# Patient Record
Sex: MALE | Race: WHITE | NOT HISPANIC OR LATINO | Employment: FULL TIME | URBAN - NONMETROPOLITAN AREA
[De-identification: names, ages, dates, MRNs, and addresses within clinical notes are randomized per-mention and may not be internally consistent; named-entity substitution may affect disease eponyms.]

---

## 2018-01-24 ENCOUNTER — OFFICE VISIT (OUTPATIENT)
Dept: INTERNAL MEDICINE | Facility: CLINIC | Age: 53
End: 2018-01-24

## 2018-01-24 VITALS
DIASTOLIC BLOOD PRESSURE: 80 MMHG | TEMPERATURE: 97.4 F | HEIGHT: 70 IN | OXYGEN SATURATION: 97 % | HEART RATE: 68 BPM | BODY MASS INDEX: 38.65 KG/M2 | RESPIRATION RATE: 14 BRPM | SYSTOLIC BLOOD PRESSURE: 122 MMHG | WEIGHT: 270 LBS

## 2018-01-24 DIAGNOSIS — Z11.59 NEED FOR HEPATITIS C SCREENING TEST: ICD-10-CM

## 2018-01-24 DIAGNOSIS — E55.9 VITAMIN D DEFICIENCY: ICD-10-CM

## 2018-01-24 DIAGNOSIS — Z00.00 HEALTH CARE MAINTENANCE: ICD-10-CM

## 2018-01-24 DIAGNOSIS — Z86.39 HISTORY OF DIABETES MELLITUS: ICD-10-CM

## 2018-01-24 DIAGNOSIS — E78.5 HYPERLIPIDEMIA LDL GOAL <130: Primary | ICD-10-CM

## 2018-01-24 PROCEDURE — 99203 OFFICE O/P NEW LOW 30 MIN: CPT | Performed by: FAMILY MEDICINE

## 2018-01-24 RX ORDER — MULTIVIT-MIN/IRON/FOLIC ACID/K 18-600-40
CAPSULE ORAL
COMMUNITY

## 2018-01-24 NOTE — PATIENT INSTRUCTIONS
Dyslipidemia  Dyslipidemia is an imbalance of waxy, fat-like substances (lipids) in the blood. The body needs lipids in small amounts. Dyslipidemia often involves a high level of cholesterol or triglycerides, which are types of lipids.  Common forms of dyslipidemia include:  · High levels of bad cholesterol (LDL cholesterol). LDL is the type of cholesterol that causes fatty deposits (plaques) to build up in the blood vessels that carry blood away from your heart (arteries).  · Low levels of good cholesterol (HDL cholesterol). HDL cholesterol is the type of cholesterol that protects against heart disease. High levels of HDL remove the LDL buildup from arteries.  · High levels of triglycerides. Triglycerides are a fatty substance in the blood that is linked to a buildup of plaques in the arteries.  You can develop dyslipidemia because of the genes you are born with (primary dyslipidemia) or changes that occur during your life (secondary dyslipidemia), or as a side effect of certain medical treatments.  What are the causes?  Primary dyslipidemia is caused by changes (mutations) in genes that are passed down through families (inherited). These mutations cause several types of dyslipidemia. Mutations can result in disorders that make the body produce too much LDL cholesterol or triglycerides, or not enough HDL cholesterol. These disorders may lead to heart disease, arterial disease, or stroke at an early age.  Causes of secondary dyslipidemia include certain lifestyle choices and diseases that lead to dyslipidemia, such as:  · Eating a diet that is high in animal fat.  · Not getting enough activity or exercise (having a sedentary lifestyle).  · Having diabetes, kidney disease, liver disease, or thyroid disease.  · Drinking large amounts of alcohol.  · Using certain types of drugs.  What increases the risk?  You may be at greater risk for dyslipidemia if you are an older man or if you are a woman who has gone through  menopause. Other risk factors include:  · Having a family history of dyslipidemia.  · Taking certain medicines, including birth control pills, steroids, some diuretics, beta-blockers, and some medicines for HIV.  · Smoking cigarettes.  · Eating a high-fat diet.  · Drinking large amounts of alcohol.  · Having certain medical conditions such as diabetes, polycystic ovary syndrome (PCOS), pregnancy, kidney disease, liver disease, or hypothyroidism.  · Not exercising regularly.  · Being overweight or obese with too much belly fat.  What are the signs or symptoms?  Dyslipidemia does not usually cause any symptoms.  Very high lipid levels can cause fatty bumps under the skin (xanthomas) or a white or gray ring around the black center (pupil) of the eye. Very high triglyceride levels can cause inflammation of the pancreas (pancreatitis).  How is this diagnosed?  Your health care provider may diagnose dyslipidemia based on a routine blood test (fasting blood test). Because most people do not have symptoms of the condition, this blood testing (lipid profile) is done on adults age 20 and older and is repeated every 5 years. This test checks:  · Total cholesterol. This is a measure of the total amount of cholesterol in your blood, including LDL cholesterol, HDL cholesterol, and triglycerides. A healthy number is below 200.  · LDL cholesterol. The target number for LDL cholesterol is different for each person, depending on individual risk factors. For most people, a number below 100 is healthy. Ask your health care provider what your LDL cholesterol number should be.  · HDL cholesterol. An HDL level of 60 or higher is best because it helps to protect against heart disease. A number below 40 for men or below 50 for women increases the risk for heart disease.  · Triglycerides. A healthy triglyceride number is below 150.  If your lipid profile is abnormal, your health care provider may do other blood tests to get more information  about your condition.  How is this treated?  Treatment depends on the type of dyslipidemia that you have and your other risk factors for heart disease and stroke. Your health care provider will have a target range for your lipid levels based on this information.  For many people, treatment starts with lifestyle changes, such as diet and exercise. Your health care provider may recommend that you:  · Get regular exercise.  · Make changes to your diet.  · Quit smoking if you smoke.  If diet changes and exercise do not help you reach your goals, your health care provider may also prescribe medicine to lower lipids. The most commonly prescribed type of medicine lowers your LDL cholesterol (statin drug). If you have a high triglyceride level, your provider may prescribe another type of drug (fibrate) or an omega-3 fish oil supplement, or both.  Follow these instructions at home:  · Take over-the-counter and prescription medicines only as told by your health care provider. This includes supplements.  · Get regular exercise. Start an aerobic exercise and strength training program as told by your health care provider. Ask your health care provider what activities are safe for you. Your health care provider may recommend:  ¨ 30 minutes of aerobic activity 4-6 days a week. Brisk walking is an example of aerobic activity.  ¨ Strength training 2 days a week.  · Eat a healthy diet as told by your health care provider. This can help you reach and maintain a healthy weight, lower your LDL cholesterol, and raise your HDL cholesterol. It may help to work with a diet and nutrition specialist (dietitian) to make a plan that is right for you. Your dietitian or health care provider may recommend:  ¨ Limiting your calories, if you are overweight.  ¨ Eating more fruits, vegetables, whole grains, fish, and lean meats.  ¨ Limiting saturated fat, trans fat, and cholesterol.  · Follow instructions from your health care provider or dietitian  about eating or drinking restrictions.  · Limit alcohol intake to no more than one drink per day for nonpregnant women and two drinks per day for men. One drink equals 12 oz of beer, 5 oz of wine, or 1½ oz of hard liquor.  · Do not use any products that contain nicotine or tobacco, such as cigarettes and e-cigarettes. If you need help quitting, ask your health care provider.  · Keep all follow-up visits as told by your health care provider. This is important.  Contact a health care provider if:  · You are having trouble sticking to your exercise or diet plan.  · You are struggling to quit smoking or control your use of alcohol.  Summary  · Dyslipidemia is an imbalance of waxy, fat-like substances (lipids) in the blood. The body needs lipids in small amounts. Dyslipidemia often involves a high level of cholesterol or triglycerides, which are types of lipids.  · Treatment depends on the type of dyslipidemia that you have and your other risk factors for heart disease and stroke.  · For many people, treatment starts with lifestyle changes, such as diet and exercise. Your health care provider may also prescribe medicine to lower lipids.  This information is not intended to replace advice given to you by your health care provider. Make sure you discuss any questions you have with your health care provider.  Document Released: 12/23/2014 Document Revised: 08/14/2017 Document Reviewed: 08/14/2017  [employer] Interactive Patient Education © 2017 [employer]

## 2018-01-25 LAB
25(OH)D3+25(OH)D2 SERPL-MCNC: 50.4 NG/ML
ALBUMIN SERPL-MCNC: 4.5 G/DL (ref 3.5–5)
ALBUMIN/GLOB SERPL: 1.4 G/DL (ref 1–2)
ALP SERPL-CCNC: 72 U/L (ref 38–126)
ALT SERPL-CCNC: 50 U/L (ref 13–69)
AST SERPL-CCNC: 31 U/L (ref 15–46)
BASOPHILS # BLD AUTO: 0.11 10*3/MM3 (ref 0–0.2)
BASOPHILS NFR BLD AUTO: 1.1 % (ref 0–2.5)
BILIRUB SERPL-MCNC: 0.7 MG/DL (ref 0.2–1.3)
BUN SERPL-MCNC: 13 MG/DL (ref 7–20)
BUN/CREAT SERPL: 16.3 (ref 6.3–21.9)
CALCIUM SERPL-MCNC: 9 MG/DL (ref 8.4–10.2)
CHLORIDE SERPL-SCNC: 106 MMOL/L (ref 98–107)
CHOLEST SERPL-MCNC: 131 MG/DL (ref 0–199)
CO2 SERPL-SCNC: 24 MMOL/L (ref 26–30)
CREAT SERPL-MCNC: 0.8 MG/DL (ref 0.6–1.3)
EOSINOPHIL # BLD AUTO: 0.4 10*3/MM3 (ref 0–0.7)
EOSINOPHIL NFR BLD AUTO: 4 % (ref 0–7)
ERYTHROCYTE [DISTWIDTH] IN BLOOD BY AUTOMATED COUNT: 13.1 % (ref 11.5–14.5)
FOLATE SERPL-MCNC: 18.5 NG/ML
GFR SERPLBLD CREATININE-BSD FMLA CKD-EPI: 101 ML/MIN/1.73
GFR SERPLBLD CREATININE-BSD FMLA CKD-EPI: 123 ML/MIN/1.73
GLOBULIN SER CALC-MCNC: 3.2 GM/DL
GLUCOSE SERPL-MCNC: 108 MG/DL (ref 74–98)
HBA1C MFR BLD: 6.4 %
HCT VFR BLD AUTO: 44.8 % (ref 42–52)
HCV AB S/CO SERPL IA: 0.1 S/CO RATIO (ref 0–0.9)
HDLC SERPL-MCNC: 42 MG/DL (ref 40–60)
HGB BLD-MCNC: 14.2 G/DL (ref 14–18)
IMM GRANULOCYTES # BLD: 0.03 10*3/MM3 (ref 0–0.06)
IMM GRANULOCYTES NFR BLD: 0.3 % (ref 0–0.6)
LDLC SERPL CALC-MCNC: 70 MG/DL (ref 0–99)
LYMPHOCYTES # BLD AUTO: 3.09 10*3/MM3 (ref 0.6–3.4)
LYMPHOCYTES NFR BLD AUTO: 30.6 % (ref 10–50)
MCH RBC QN AUTO: 29.6 PG (ref 27–31)
MCHC RBC AUTO-ENTMCNC: 31.7 G/DL (ref 30–37)
MCV RBC AUTO: 93.5 FL (ref 80–94)
MONOCYTES # BLD AUTO: 1 10*3/MM3 (ref 0–0.9)
MONOCYTES NFR BLD AUTO: 9.9 % (ref 0–12)
NEUTROPHILS # BLD AUTO: 5.48 10*3/MM3 (ref 2–6.9)
NEUTROPHILS NFR BLD AUTO: 54.1 % (ref 37–80)
NRBC BLD AUTO-RTO: 0 /100 WBC (ref 0–0)
PLATELET # BLD AUTO: 207 10*3/MM3 (ref 130–400)
POTASSIUM SERPL-SCNC: 4.5 MMOL/L (ref 3.5–5.1)
PROT SERPL-MCNC: 7.7 G/DL (ref 6.3–8.2)
RBC # BLD AUTO: 4.79 10*6/MM3 (ref 4.7–6.1)
SODIUM SERPL-SCNC: 142 MMOL/L (ref 137–145)
TRIGL SERPL-MCNC: 97 MG/DL
VIT B12 SERPL-MCNC: 710 PG/ML (ref 239–931)
VLDLC SERPL CALC-MCNC: 19.4 MG/DL
WBC # BLD AUTO: 10.11 10*3/MM3 (ref 4.8–10.8)

## 2018-01-26 NOTE — PROGRESS NOTES
Subjective    Dom Marin is a 52 y.o. male here for:  Chief Complaint   Patient presents with   • Establish Care     No concerns just needing to establish with PCP   • Hyperlipidemia     Hyperlipidemia   This is a chronic problem. The current episode started more than 1 year ago. Exacerbating diseases include diabetes (questionable, last a1c was 6.5, took metformi but then taken off) and obesity. He has no history of liver disease. Associated symptoms include shortness of breath (with exertion). Pertinent negatives include no chest pain. Current antihyperlipidemic treatment includes statins. The current treatment provides significant improvement of lipids. Compliance problems include adherence to diet and adherence to exercise.  Risk factors for coronary artery disease include male sex, obesity, dyslipidemia, family history and stress.            The following portions of the patient's history were reviewed and updated as appropriate: allergies, current medications, past family history, past medical history, past social history, past surgical history and problem list.    Review of Systems   Respiratory: Positive for shortness of breath (with exertion).    Cardiovascular: Negative for chest pain.   Skin:        itching   All other systems reviewed and are negative.      Vitals:    01/24/18 0958   BP: 122/80   Pulse: 68   Resp: 14   Temp: 97.4 °F (36.3 °C)   SpO2: 97%         Objective   Physical Exam   Constitutional: He is oriented to person, place, and time. Vital signs are normal. He appears well-developed and well-nourished. He is active. He does not have a sickly appearance. He does not appear ill.   Appears stated age. Well groomed.   HENT:   Head: Normocephalic and atraumatic.   Right Ear: Hearing normal.   Left Ear: Hearing normal.   Nose: Nose normal.   Mouth/Throat: Mucous membranes are not dry.   Eyes: EOM and lids are normal. Pupils are equal, round, and reactive to light. No scleral icterus.    Cardiovascular: Normal rate, regular rhythm and normal heart sounds.  Exam reveals no gallop and no friction rub.    No murmur heard.  Pulmonary/Chest: Effort normal and breath sounds normal.   Musculoskeletal: He exhibits no deformity.   Neurological: He is alert and oriented to person, place, and time. He displays no tremor. No cranial nerve deficit. Gait normal.   Skin: Skin is warm. He is not diaphoretic. No cyanosis. Nails show no clubbing.   Psychiatric: He has a normal mood and affect. His speech is normal and behavior is normal. Judgment and thought content normal. Cognition and memory are normal.   Nursing note and vitals reviewed.          Assessment/Plan       Dom was seen today for establish care and hyperlipidemia.    Diagnoses and all orders for this visit:    Hyperlipidemia LDL goal <130  -     Comprehensive Metabolic Panel  -     CBC & Differential  -     Lipid Panel    History of diabetes mellitus  -     Hemoglobin A1c    Vitamin D deficiency  -     Vitamin D 25 Hydroxy    Need for hepatitis C screening test  -     Hepatitis C Antibody    Health care maintenance  -     Vitamin B12 & Folate        · Records from previous provider requested.  ·     Return in about 6 months (around 7/24/2018) for Annual physical.    Suma Zurita MD    Please note that portions of this note may have been completed with a voice recognition program. Efforts were made to edit dictation, but occasionally words are mistranscribed.

## 2018-07-31 ENCOUNTER — TELEPHONE (OUTPATIENT)
Dept: INTERNAL MEDICINE | Facility: CLINIC | Age: 53
End: 2018-07-31

## 2018-08-24 ENCOUNTER — OFFICE VISIT (OUTPATIENT)
Dept: INTERNAL MEDICINE | Facility: CLINIC | Age: 53
End: 2018-08-24

## 2018-08-24 VITALS
SYSTOLIC BLOOD PRESSURE: 130 MMHG | WEIGHT: 272 LBS | BODY MASS INDEX: 40.29 KG/M2 | DIASTOLIC BLOOD PRESSURE: 80 MMHG | HEART RATE: 94 BPM | OXYGEN SATURATION: 96 % | TEMPERATURE: 98.6 F | HEIGHT: 69 IN

## 2018-08-24 DIAGNOSIS — E66.9 CLASS 2 OBESITY WITHOUT SERIOUS COMORBIDITY WITH BODY MASS INDEX (BMI) OF 39.0 TO 39.9 IN ADULT, UNSPECIFIED OBESITY TYPE: ICD-10-CM

## 2018-08-24 DIAGNOSIS — R22.1 NECK MASS: ICD-10-CM

## 2018-08-24 DIAGNOSIS — E78.5 HYPERLIPIDEMIA LDL GOAL <130: Primary | ICD-10-CM

## 2018-08-24 DIAGNOSIS — R73.09 ELEVATED HEMOGLOBIN A1C: ICD-10-CM

## 2018-08-24 LAB
BASOPHILS # BLD AUTO: 0.11 10*3/MM3 (ref 0–0.2)
BASOPHILS NFR BLD AUTO: 0.9 % (ref 0–2.5)
EOSINOPHIL # BLD AUTO: 0.31 10*3/MM3 (ref 0–0.7)
EOSINOPHIL NFR BLD AUTO: 2.7 % (ref 0–7)
ERYTHROCYTE [DISTWIDTH] IN BLOOD BY AUTOMATED COUNT: 13.2 % (ref 11.5–14.5)
HCT VFR BLD AUTO: 45 % (ref 42–52)
HGB BLD-MCNC: 15 G/DL (ref 14–18)
IMM GRANULOCYTES # BLD: 0.05 10*3/MM3 (ref 0–0.06)
IMM GRANULOCYTES NFR BLD: 0.4 % (ref 0–0.6)
LYMPHOCYTES # BLD AUTO: 3.57 10*3/MM3 (ref 0.6–3.4)
LYMPHOCYTES NFR BLD AUTO: 30.6 % (ref 10–50)
MCH RBC QN AUTO: 31 PG (ref 27–31)
MCHC RBC AUTO-ENTMCNC: 33.3 G/DL (ref 30–37)
MCV RBC AUTO: 93 FL (ref 80–94)
MONOCYTES # BLD AUTO: 1.07 10*3/MM3 (ref 0–0.9)
MONOCYTES NFR BLD AUTO: 9.2 % (ref 0–12)
NEUTROPHILS # BLD AUTO: 6.57 10*3/MM3 (ref 2–6.9)
NEUTROPHILS NFR BLD AUTO: 56.2 % (ref 37–80)
NRBC BLD AUTO-RTO: 0 /100 WBC (ref 0–0)
PLATELET # BLD AUTO: 252 10*3/MM3 (ref 130–400)
RBC # BLD AUTO: 4.84 10*6/MM3 (ref 4.7–6.1)
WBC # BLD AUTO: 11.68 10*3/MM3 (ref 4.8–10.8)

## 2018-08-24 PROCEDURE — 99214 OFFICE O/P EST MOD 30 MIN: CPT | Performed by: FAMILY MEDICINE

## 2018-08-24 RX ORDER — SIMVASTATIN 40 MG
40 TABLET ORAL NIGHTLY
Qty: 90 TABLET | Refills: 3 | Status: SHIPPED | OUTPATIENT
Start: 2018-08-24 | End: 2018-12-31 | Stop reason: SDUPTHER

## 2018-08-24 NOTE — PATIENT INSTRUCTIONS
MyPlate from [employer]  The general, healthful diet is based on the 2010 Dietary Guidelines for Americans. The amount of food you need to eat from each food group depends on your age, sex, and level of physical activity and can be individualized by a dietitian. Go to ChooseMyPlate.gov for more information.  What do I need to know about the MyPlate plan?  · Enjoy your food, but eat less.  · Avoid oversized portions.  ? ½ of your plate should include fruits and vegetables.  ? ¼ of your plate should be grains.  ? ¼ of your plate should be protein.  Grains  · Make at least half of your grains whole grains.  · For a 2,000 calorie daily food plan, eat 6 oz every day.  · 1 oz is about 1 slice bread, 1 cup cereal, or ½ cup cooked rice, cereal, or pasta.  Vegetables  · Make half your plate fruits and vegetables.  · For a 2,000 calorie daily food plan, eat 2½ cups every day.  · 1 cup is about 1 cup raw or cooked vegetables or vegetable juice or 2 cups raw leafy greens.  Fruits  · Make half your plate fruits and vegetables.  · For a 2,000 calorie daily food plan, eat 2 cups every day.  · 1 cup is about 1 cup fruit or 100% fruit juice or ½ cup dried fruit.  Protein  · For a 2,000 calorie daily food plan, eat 5½ oz every day.  · 1 oz is about 1 oz meat, poultry, or fish, ¼ cup cooked beans, 1 egg, 1 Tbsp peanut butter, or ½ oz nuts or seeds.  Dairy  · Switch to fat-free or low-fat (1%) milk.  · For a 2,000 calorie daily food plan, eat 3 cups every day.  · 1 cup is about 1 cup milk or yogurt or soy milk (soy beverage), 1½ oz natural cheese, or 2 oz processed cheese.  Fats, Oils, and Empty Calories  · Only small amounts of oils are recommended.  · Empty calories are calories from solid fats or added sugars.  · Compare sodium in foods like soup, bread, and frozen meals. Choose the foods with lower numbers.  · Drink water instead of sugary drinks.  What foods can I eat?  Grains  Whole grains such as whole wheat, quinoa, millet, and  bulgur. Bread, rolls, and pasta made from whole grains. Brown or wild rice. Hot or cold cereals made from whole grains and without added sugar.  Vegetables  All fresh vegetables, especially fresh red, dark green, or orange vegetables. Peas and beans. Low-sodium frozen or canned vegetables prepared without added salt. Low-sodium vegetable juices.  Fruits  All fresh, frozen, and dried fruits. Canned fruit packed in water or fruit juice without added sugar. Fruit juices without added sugar.  Meats and Other Protein Sources  Boiled, baked, or grilled lean meat trimmed of fat. Skinless poultry. Fresh seafood and shellfish. Canned seafood packed in water. Unsalted nuts and unsalted nut butters. Tofu. Dried beans and pea. Eggs.  Dairy  Low-fat or fat-free milk, yogurt, and cheeses.  Sweets and Desserts  Frozen desserts made from low-fat milk.  Fats and Oils  Olive, peanut, and canola oils and margarine. Salad dressing and mayonnaise made from these oils.  Other  Soups and casseroles made from allowed ingredients and without added fat or salt.  The items listed above may not be a complete list of recommended foods or beverages. Contact your dietitian for more options.  What foods are not recommended?  Grains  Sweetened, low-fiber cereals. Packaged baked goods. Snack crackers and chips. Cheese crackers, butter crackers, and biscuits. Frozen waffles, sweet breads, doughnuts, pastries, packaged baking mixes, pancakes, cakes, and cookies.  Vegetables  Regular canned or frozen vegetables or vegetables prepared with salt. Canned tomatoes. Canned tomato sauce. Fried vegetables. Vegetables in cream sauce or cheese sauce.  Fruits  Fruits packed in syrup or made with added sugar.  Meats and Other Protein Sources  Marbled or fatty meats such as ribs. Poultry with skin. Fried meats, poultry, eggs, or fish. Sausages, hot dogs, and deli meats such as pastrami, bologna, or salami.  Dairy  Whole milk, cream, cheeses made from whole milk,  sour cream. Ice cream or yogurt made from whole milk or with added sugar.  Beverages  For adults, no more than one alcoholic drink per day. Regular soft drinks or other sugary beverages. Juice drinks.  Sweets and Desserts  Sugary or fatty desserts, candy, and other sweets.  Fats and Oils  Solid shortening or partially hydrogenated oils. Solid margarine. Margarine that contains trans fats. Butter.  The items listed above may not be a complete list of foods and beverages to avoid. Contact your dietitian for more information.  This information is not intended to replace advice given to you by your health care provider. Make sure you discuss any questions you have with your health care provider.  Document Released: 01/06/2009 Document Revised: 05/25/2017 Document Reviewed: 11/26/2014  [employer] Interactive Patient Education © 2018 [employer]    Serving Sizes  A serving size is a measured amount of food or drink, such as one slice of bread, that has an associated nutrient content. Knowing the serving size of a food or drink can help you determine how much of that food you should consume.  What is the size of one serving?  The size of one healthy serving depends on the food or drink. To determine a serving size, read the food label. If the food or drink does not have a food label, try to find serving size information online. Or, use the following to estimate the size of one adult serving:  Grain  1 slice bread. ½ bagel. ½ cup pasta.  Vegetable  ½ cup cooked or canned vegetables. 1 cup raw, leafy greens.  Fruit  ½ cup canned fruit. 1 medium fruit. ¼ cup dried fruit.  Meat and Other Protein Sources  1 oz meat, poultry, or fish. ¼ cup cooked beans. 1 egg. ¼ cup nuts or seeds. 1 Tbsp nut butter. ¼ cup tofu or tempeh. 2 Tbsp hummus.  Dairy  An individual container of yogurt (6-8 oz). 1 piece of cheese the size of your thumb (1 oz). 1 cup (8 oz) milk or milk alternative.  Fat  A piece the size of one dice. 1 tsp soft  margarine. 1 Tbsp mayonnaise. 1 tsp vegetable oil. 1 Tbsp regular salad dressing. 2 Tbsp low-fat salad dressing.  How many servings should I eat from each food group each day?  The following are the suggested number of servings to try and have every day from each food group. You can also look at your eating throughout the week and aim for meeting these requirements on most days for overall healthy eating.  Grain  6-8 servings. Try to have half of your grains from whole grains, such as whole wheat bread, corn tortillas, oatmeal, brown rice, whole wheat pasta, and bulgur.  Vegetable  At least 2½-3 servings.  Fruit  2 servings.  Meat and Other Protein Foods  5-6 servings. Aim to have lean proteins, such as chicken, turkey, fish, beans, or tofu.  Dairy  3 servings. Choose low-fat or nonfat if you are trying to control your weight.  Fat  2-3 servings.  Is a serving the same thing as a portion?  No. A portion is the actual amount you eat, which may be more than one serving. Knowing the specific serving size of a food and the nutritional information that goes with it can help you make a healthy decision on what size portion to eat.  What are some tips to help me learn healthy serving sizes?  · Check food labels for serving sizes. Many foods that come as a single portion actually contain multiple servings.  · Determine the serving size of foods you commonly eat and figure out how large a portion you usually eat.  · Measure the number of servings that can be held by the bowls, glasses, cups, and plates you typically use. For example, pour your breakfast cereal into your regular bowl and then pour it into a measuring cup.  · For 1-2 days, measure the serving sizes of all the foods you eat.  · Practice estimating serving sizes and determining how big your portions should be.  This information is not intended to replace advice given to you by your health care provider. Make sure you discuss any questions you have with your  health care provider.  Document Released: 09/15/2004 Document Revised: 08/12/2017 Document Reviewed: 03/17/2015  [employer] Interactive Patient Education © 2018 [employer]

## 2018-08-24 NOTE — PROGRESS NOTES
"Subjective    Dom Marin is a 52 y.o. male here for:  Chief Complaint   Patient presents with   • Hyperlipidemia     Follow up on HLD and Medication refills.     Hyperlipidemia   This is a chronic problem. The current episode started more than 1 year ago. The problem is uncontrolled. Exacerbating diseases include obesity. He has no history of diabetes (patient has pre-diabetes). Factors aggravating his hyperlipidemia include fatty foods. Associated symptoms include myalgias (cramps). He is currently on no antihyperlipidemic treatment (Was previously taking simvastatin 40 mg qhs but has been out). Compliance problems include adherence to diet and adherence to exercise.  Risk factors for coronary artery disease include obesity, male sex and stress.      Pre-diabetic for many years.     The following portions of the patient's history were reviewed and updated as appropriate: allergies, current medications, past family history, past medical history, past social history, past surgical history and problem list.    Review of Systems   Constitutional: Negative for activity change.   Musculoskeletal: Positive for arthralgias and myalgias (cramps).       Vitals:    08/24/18 1550   BP: 130/80   Pulse: 94   Temp: 98.6 °F (37 °C)   SpO2: 96%   Weight: 123 kg (272 lb)   Height: 176.5 cm (69.49\")         Objective   Physical Exam   Constitutional: He is oriented to person, place, and time. Vital signs are normal. He appears well-developed and well-nourished. He is active.  Non-toxic appearance. He does not have a sickly appearance. No distress. He is obese.  HENT:   Head: Normocephalic and atraumatic.   Right Ear: Hearing normal.   Left Ear: Hearing normal.   Mouth/Throat: Mucous membranes are not dry.   Eyes: EOM are normal. No scleral icterus.   Neck: Phonation normal. Neck supple.       Cardiovascular: Normal rate, regular rhythm and normal heart sounds.  Exam reveals no gallop and no friction rub.    No murmur " heard.  Pulmonary/Chest: Effort normal and breath sounds normal.   Lymphadenopathy:        Head (right side): No submandibular, no preauricular and no posterior auricular adenopathy present.        Head (left side): No submandibular, no preauricular and no posterior auricular adenopathy present.     He has no cervical adenopathy.        Right: No supraclavicular adenopathy present.        Left: No supraclavicular adenopathy present.   Neurological: He is alert and oriented to person, place, and time. He displays no tremor. No cranial nerve deficit. Gait normal.   Skin: Skin is warm. He is not diaphoretic.   Psychiatric: He has a normal mood and affect. His speech is normal and behavior is normal. Judgment and thought content normal. Cognition and memory are normal.   Nursing note and vitals reviewed.      Lab Results   Component Value Date    CHLPL 131 01/24/2018    TRIG 97 01/24/2018    HDL 42 01/24/2018    LDL 70 01/24/2018     Lab Results   Component Value Date    HGBA1C 6.40 01/24/2018         Assessment/Plan     Problem List Items Addressed This Visit        Cardiovascular and Mediastinum    Hyperlipidemia LDL goal <130 - Primary    Relevant Medications    simvastatin (ZOCOR) 40 MG tablet    Other Relevant Orders    Lipid Panel    Comprehensive Metabolic Panel      Other Visit Diagnoses     Elevated hemoglobin A1c        Relevant Orders    Hemoglobin A1c    Class 2 obesity without serious comorbidity with body mass index (BMI) of 39.0 to 39.9 in adult, unspecified obesity type        Neck mass        Relevant Orders    US Head Neck Soft Tissue    CBC & Differential          · Patient fasting for labs  · Discussed possible side effects of simvastatin, if these occur can change statin therapy. Never to go higher than 40 mg.  · Patient's Body mass index is 39.61 kg/m². BMI is above normal parameters. Recommendations include: educational material.  · Will further assess neck lesion    Return in about 6 months  (around 2/24/2019) for Follow up on current issues.    Suma Zurita MD

## 2018-08-25 LAB
ALBUMIN SERPL-MCNC: 4.5 G/DL (ref 3.5–5)
ALBUMIN/GLOB SERPL: 1.5 G/DL (ref 1–2)
ALP SERPL-CCNC: 82 U/L (ref 38–126)
ALT SERPL-CCNC: 39 U/L (ref 13–69)
AST SERPL-CCNC: 40 U/L (ref 15–46)
BILIRUB SERPL-MCNC: 0.9 MG/DL (ref 0.2–1.3)
BUN SERPL-MCNC: 13 MG/DL (ref 7–20)
BUN/CREAT SERPL: 16.3 (ref 6.3–21.9)
CALCIUM SERPL-MCNC: 9.1 MG/DL (ref 8.4–10.2)
CHLORIDE SERPL-SCNC: 102 MMOL/L (ref 98–107)
CHOLEST SERPL-MCNC: 186 MG/DL (ref 0–199)
CO2 SERPL-SCNC: 27 MMOL/L (ref 26–30)
CREAT SERPL-MCNC: 0.8 MG/DL (ref 0.6–1.3)
GLOBULIN SER CALC-MCNC: 3 GM/DL
GLUCOSE SERPL-MCNC: 110 MG/DL (ref 74–98)
HBA1C MFR BLD: 6.7 %
HDLC SERPL-MCNC: 49 MG/DL (ref 40–60)
LDLC SERPL CALC-MCNC: 117 MG/DL (ref 0–99)
POTASSIUM SERPL-SCNC: 4.4 MMOL/L (ref 3.5–5.1)
PROT SERPL-MCNC: 7.5 G/DL (ref 6.3–8.2)
SODIUM SERPL-SCNC: 139 MMOL/L (ref 137–145)
TRIGL SERPL-MCNC: 100 MG/DL
VLDLC SERPL CALC-MCNC: 20 MG/DL

## 2018-08-31 ENCOUNTER — HOSPITAL ENCOUNTER (OUTPATIENT)
Dept: ULTRASOUND IMAGING | Facility: HOSPITAL | Age: 53
Discharge: HOME | End: 2018-08-31
Admitting: FAMILY MEDICINE

## 2018-08-31 DIAGNOSIS — R22.1 NECK MASS: ICD-10-CM

## 2018-08-31 PROCEDURE — 76536 US EXAM OF HEAD AND NECK: CPT

## 2018-12-31 DIAGNOSIS — E78.5 HYPERLIPIDEMIA LDL GOAL <130: ICD-10-CM

## 2019-01-02 RX ORDER — SIMVASTATIN 40 MG
40 TABLET ORAL NIGHTLY
Qty: 90 TABLET | Refills: 3 | Status: SHIPPED | OUTPATIENT
Start: 2019-01-02 | End: 2019-02-25 | Stop reason: SDUPTHER

## 2019-02-25 ENCOUNTER — OFFICE VISIT (OUTPATIENT)
Dept: INTERNAL MEDICINE | Facility: CLINIC | Age: 54
End: 2019-02-25

## 2019-02-25 VITALS
HEIGHT: 69 IN | SYSTOLIC BLOOD PRESSURE: 134 MMHG | WEIGHT: 273 LBS | BODY MASS INDEX: 40.43 KG/M2 | RESPIRATION RATE: 16 BRPM | DIASTOLIC BLOOD PRESSURE: 86 MMHG | HEART RATE: 71 BPM | TEMPERATURE: 97.2 F | OXYGEN SATURATION: 96 %

## 2019-02-25 DIAGNOSIS — R73.09 ELEVATED HEMOGLOBIN A1C: ICD-10-CM

## 2019-02-25 DIAGNOSIS — Z13.29 THYROID DISORDER SCREEN: ICD-10-CM

## 2019-02-25 DIAGNOSIS — E66.01 CLASS 2 SEVERE OBESITY DUE TO EXCESS CALORIES WITH SERIOUS COMORBIDITY AND BODY MASS INDEX (BMI) OF 39.0 TO 39.9 IN ADULT (HCC): ICD-10-CM

## 2019-02-25 DIAGNOSIS — E78.5 HYPERLIPIDEMIA LDL GOAL <130: Primary | ICD-10-CM

## 2019-02-25 PROBLEM — E66.811 CLASS 1 OBESITY WITH SERIOUS COMORBIDITY AND BODY MASS INDEX (BMI) OF 34.0 TO 34.9 IN ADULT: Status: ACTIVE | Noted: 2019-02-25

## 2019-02-25 PROBLEM — E66.9 CLASS 1 OBESITY WITH SERIOUS COMORBIDITY AND BODY MASS INDEX (BMI) OF 34.0 TO 34.9 IN ADULT: Status: ACTIVE | Noted: 2019-02-25

## 2019-02-25 PROBLEM — E66.812 CLASS 2 SEVERE OBESITY WITH SERIOUS COMORBIDITY AND BODY MASS INDEX (BMI) OF 39.0 TO 39.9 IN ADULT: Status: ACTIVE | Noted: 2019-02-25

## 2019-02-25 LAB
ALBUMIN SERPL-MCNC: 4.1 G/DL (ref 3.5–5)
ALBUMIN/GLOB SERPL: 1.3 G/DL (ref 1–2)
ALP SERPL-CCNC: 83 U/L (ref 38–126)
ALT SERPL-CCNC: 45 U/L (ref 13–69)
AST SERPL-CCNC: 26 U/L (ref 15–46)
BASOPHILS # BLD AUTO: 0.12 10*3/MM3 (ref 0–0.2)
BASOPHILS NFR BLD AUTO: 1.2 % (ref 0–2.5)
BILIRUB SERPL-MCNC: 0.4 MG/DL (ref 0.2–1.3)
BUN SERPL-MCNC: 12 MG/DL (ref 7–20)
BUN/CREAT SERPL: 15 (ref 6.3–21.9)
CALCIUM SERPL-MCNC: 9.3 MG/DL (ref 8.4–10.2)
CHLORIDE SERPL-SCNC: 106 MMOL/L (ref 98–107)
CHOLEST SERPL-MCNC: 112 MG/DL (ref 0–199)
CO2 SERPL-SCNC: 25 MMOL/L (ref 26–30)
CREAT SERPL-MCNC: 0.8 MG/DL (ref 0.6–1.3)
EOSINOPHIL # BLD AUTO: 0.43 10*3/MM3 (ref 0–0.7)
EOSINOPHIL NFR BLD AUTO: 4.4 % (ref 0–7)
ERYTHROCYTE [DISTWIDTH] IN BLOOD BY AUTOMATED COUNT: 13 % (ref 11.5–14.5)
GLOBULIN SER CALC-MCNC: 3.2 GM/DL
GLUCOSE SERPL-MCNC: 127 MG/DL (ref 74–98)
HBA1C MFR BLD: 6.9 %
HCT VFR BLD AUTO: 47.5 % (ref 42–52)
HDLC SERPL-MCNC: 38 MG/DL (ref 40–60)
HGB BLD-MCNC: 15.5 G/DL (ref 14–18)
IMM GRANULOCYTES # BLD AUTO: 0.03 10*3/MM3 (ref 0–0.06)
IMM GRANULOCYTES NFR BLD AUTO: 0.3 % (ref 0–0.6)
LDLC SERPL CALC-MCNC: 43 MG/DL (ref 0–99)
LYMPHOCYTES # BLD AUTO: 3.6 10*3/MM3 (ref 0.6–3.4)
LYMPHOCYTES NFR BLD AUTO: 36.8 % (ref 10–50)
MCH RBC QN AUTO: 31.3 PG (ref 27–31)
MCHC RBC AUTO-ENTMCNC: 32.6 G/DL (ref 30–37)
MCV RBC AUTO: 95.8 FL (ref 80–94)
MONOCYTES # BLD AUTO: 0.93 10*3/MM3 (ref 0–0.9)
MONOCYTES NFR BLD AUTO: 9.5 % (ref 0–12)
NEUTROPHILS # BLD AUTO: 4.66 10*3/MM3 (ref 2–6.9)
NEUTROPHILS NFR BLD AUTO: 47.8 % (ref 37–80)
NRBC BLD AUTO-RTO: 0 /100 WBC (ref 0–0)
PLATELET # BLD AUTO: 205 10*3/MM3 (ref 130–400)
POTASSIUM SERPL-SCNC: 4.3 MMOL/L (ref 3.5–5.1)
PROT SERPL-MCNC: 7.3 G/DL (ref 6.3–8.2)
RBC # BLD AUTO: 4.96 10*6/MM3 (ref 4.7–6.1)
SODIUM SERPL-SCNC: 140 MMOL/L (ref 137–145)
T4 FREE SERPL-MCNC: 0.71 NG/DL (ref 0.78–2.19)
TRIGL SERPL-MCNC: 155 MG/DL
TSH SERPL DL<=0.005 MIU/L-ACNC: 3.49 MIU/ML (ref 0.47–4.68)
VLDLC SERPL CALC-MCNC: 31 MG/DL
WBC # BLD AUTO: 9.77 10*3/MM3 (ref 4.8–10.8)

## 2019-02-25 PROCEDURE — 99213 OFFICE O/P EST LOW 20 MIN: CPT | Performed by: FAMILY MEDICINE

## 2019-02-25 RX ORDER — SIMVASTATIN 40 MG
40 TABLET ORAL NIGHTLY
Qty: 90 TABLET | Refills: 3 | Status: SHIPPED | OUTPATIENT
Start: 2019-02-25 | End: 2019-09-04 | Stop reason: SDUPTHER

## 2019-02-25 NOTE — PATIENT INSTRUCTIONS
Serving Sizes  A serving size is a measured amount of food or drink, such as one slice of bread, that has an associated nutrient content. Knowing the serving size of a food or drink can help you determine how much of that food you should consume.  What is the size of one serving?  The size of one healthy serving depends on the food or drink. To determine a serving size, read the food label. If the food or drink does not have a food label, try to find serving size information online. Or, use the following to estimate the size of one adult serving:  Grain  1 slice bread. ½ bagel. ½ cup pasta.  Vegetable  ½ cup cooked or canned vegetables. 1 cup raw, leafy greens.  Fruit  ½ cup canned fruit. 1 medium fruit. ¼ cup dried fruit.  Meat and Other Protein Sources  1 oz meat, poultry, or fish. ¼ cup cooked beans. 1 egg. ¼ cup nuts or seeds. 1 Tbsp nut butter. ¼ cup tofu or tempeh. 2 Tbsp hummus.  Dairy  An individual container of yogurt (6-8 oz). 1 piece of cheese the size of your thumb (1 oz). 1 cup (8 oz) milk or milk alternative.  Fat  A piece the size of one dice. 1 tsp soft margarine. 1 Tbsp mayonnaise. 1 tsp vegetable oil. 1 Tbsp regular salad dressing. 2 Tbsp low-fat salad dressing.  How many servings should I eat from each food group each day?  The following are the suggested number of servings to try and have every day from each food group. You can also look at your eating throughout the week and aim for meeting these requirements on most days for overall healthy eating.  Grain  6-8 servings. Try to have half of your grains from whole grains, such as whole wheat bread, corn tortillas, oatmeal, brown rice, whole wheat pasta, and bulgur.  Vegetable  At least 2½-3 servings.  Fruit  2 servings.  Meat and Other Protein Foods  5-6 servings. Aim to have lean proteins, such as chicken, turkey, fish, beans, or tofu.  Dairy  3 servings. Choose low-fat or nonfat if you are trying to control your weight.  Fat  2-3 servings.  Is  a serving the same thing as a portion?  No. A portion is the actual amount you eat, which may be more than one serving. Knowing the specific serving size of a food and the nutritional information that goes with it can help you make a healthy decision on what size portion to eat.  What are some tips to help me learn healthy serving sizes?  · Check food labels for serving sizes. Many foods that come as a single portion actually contain multiple servings.  · Determine the serving size of foods you commonly eat and figure out how large a portion you usually eat.  · Measure the number of servings that can be held by the bowls, glasses, cups, and plates you typically use. For example, pour your breakfast cereal into your regular bowl and then pour it into a measuring cup.  · For 1-2 days, measure the serving sizes of all the foods you eat.  · Practice estimating serving sizes and determining how big your portions should be.      This information is not intended to replace advice given to you by your health care provider. Make sure you discuss any questions you have with your health care provider.  Document Released: 09/15/2004 Document Revised: 08/12/2017 Document Reviewed: 03/17/2015  [employer] Interactive Patient Education © 2018 [employer]    MyPlate from [employer]    The general, healthful diet is based on the 2010 Dietary Guidelines for Americans. The amount of food you need to eat from each food group depends on your age, sex, and level of physical activity and can be individualized by a dietitian. Go to ChooseMyPlate.gov for more information.  What do I need to know about the MyPlate plan?  · Enjoy your food, but eat less.  · Avoid oversized portions.  ? ½ of your plate should include fruits and vegetables.  ? ¼ of your plate should be grains.  ? ¼ of your plate should be protein.  Grains  · Make at least half of your grains whole grains.  · For a 2,000 calorie daily food plan, eat 6 oz every day.  · 1 oz is about 1  slice bread, 1 cup cereal, or ½ cup cooked rice, cereal, or pasta.  Vegetables  · Make half your plate fruits and vegetables.  · For a 2,000 calorie daily food plan, eat 2½ cups every day.  · 1 cup is about 1 cup raw or cooked vegetables or vegetable juice or 2 cups raw leafy greens.  Fruits  · Make half your plate fruits and vegetables.  · For a 2,000 calorie daily food plan, eat 2 cups every day.  · 1 cup is about 1 cup fruit or 100% fruit juice or ½ cup dried fruit.  Protein  · For a 2,000 calorie daily food plan, eat 5½ oz every day.  · 1 oz is about 1 oz meat, poultry, or fish, ¼ cup cooked beans, 1 egg, 1 Tbsp peanut butter, or ½ oz nuts or seeds.  Dairy  · Switch to fat-free or low-fat (1%) milk.  · For a 2,000 calorie daily food plan, eat 3 cups every day.  · 1 cup is about 1 cup milk or yogurt or soy milk (soy beverage), 1½ oz natural cheese, or 2 oz processed cheese.  Fats, Oils, and Empty Calories  · Only small amounts of oils are recommended.  · Empty calories are calories from solid fats or added sugars.  · Compare sodium in foods like soup, bread, and frozen meals. Choose the foods with lower numbers.  · Drink water instead of sugary drinks.  What foods can I eat?  Grains  Whole grains such as whole wheat, quinoa, millet, and bulgur. Bread, rolls, and pasta made from whole grains. Brown or wild rice. Hot or cold cereals made from whole grains and without added sugar.  Vegetables  All fresh vegetables, especially fresh red, dark green, or orange vegetables. Peas and beans. Low-sodium frozen or canned vegetables prepared without added salt. Low-sodium vegetable juices.  Fruits  All fresh, frozen, and dried fruits. Canned fruit packed in water or fruit juice without added sugar. Fruit juices without added sugar.  Meats and Other Protein Sources  Boiled, baked, or grilled lean meat trimmed of fat. Skinless poultry. Fresh seafood and shellfish. Canned seafood packed in water. Unsalted nuts and unsalted nut  butters. Tofu. Dried beans and pea. Eggs.  Dairy  Low-fat or fat-free milk, yogurt, and cheeses.  Sweets and Desserts  Frozen desserts made from low-fat milk.  Fats and Oils  Olive, peanut, and canola oils and margarine. Salad dressing and mayonnaise made from these oils.  Other  Soups and casseroles made from allowed ingredients and without added fat or salt.  The items listed above may not be a complete list of recommended foods or beverages. Contact your dietitian for more options.  What foods are not recommended?  Grains  Sweetened, low-fiber cereals. Packaged baked goods. Snack crackers and chips. Cheese crackers, butter crackers, and biscuits. Frozen waffles, sweet breads, doughnuts, pastries, packaged baking mixes, pancakes, cakes, and cookies.  Vegetables  Regular canned or frozen vegetables or vegetables prepared with salt. Canned tomatoes. Canned tomato sauce. Fried vegetables. Vegetables in cream sauce or cheese sauce.  Fruits  Fruits packed in syrup or made with added sugar.  Meats and Other Protein Sources  Marbled or fatty meats such as ribs. Poultry with skin. Fried meats, poultry, eggs, or fish. Sausages, hot dogs, and deli meats such as pastrami, bologna, or salami.  Dairy  Whole milk, cream, cheeses made from whole milk, sour cream. Ice cream or yogurt made from whole milk or with added sugar.  Beverages  For adults, no more than one alcoholic drink per day. Regular soft drinks or other sugary beverages. Juice drinks.  Sweets and Desserts  Sugary or fatty desserts, candy, and other sweets.  Fats and Oils  Solid shortening or partially hydrogenated oils. Solid margarine. Margarine that contains trans fats. Butter.    The items listed above may not be a complete list of foods and beverages to avoid. Contact your dietitian for more information.    This information is not intended to replace advice given to you by your health care provider. Make sure you discuss any questions you have with your health  care provider.  Document Released: 01/06/2009 Document Revised: 05/25/2017 Document Reviewed: 11/26/2014  [employer] Interactive Patient Education © 2018 [employer]        Calorie Counting for Weight Loss  Calories are units of energy. Your body needs a certain amount of calories from food to keep you going throughout the day. When you eat more calories than your body needs, your body stores the extra calories as fat. When you eat fewer calories than your body needs, your body burns fat to get the energy it needs.  Calorie counting means keeping track of how many calories you eat and drink each day. Calorie counting can be helpful if you need to lose weight. If you make sure to eat fewer calories than your body needs, you should lose weight. Ask your health care provider what a healthy weight is for you.  For calorie counting to work, you will need to eat the right number of calories in a day in order to lose a healthy amount of weight per week. A dietitian can help you determine how many calories you need in a day and will give you suggestions on how to reach your calorie goal.  · A healthy amount of weight to lose per week is usually 1-2 lb (0.5-0.9 kg). This usually means that your daily calorie intake should be reduced by 500-750 calories.  · Eating 1,200 - 1,500 calories per day can help most women lose weight.  · Eating 1,500 - 1,800 calories per day can help most men lose weight.     What do I need to know about calorie counting?  In order to meet your daily calorie goal, you will need to:  · Find out how many calories are in each food you would like to eat. Try to do this before you eat.  · Decide how much of the food you plan to eat.  · Write down what you ate and how many calories it had. Doing this is called keeping a food log.     To successfully lose weight, it is important to balance calorie counting with a healthy lifestyle that includes regular activity. Aim for 150 minutes of moderate exercise  (such as walking) or 75 minutes of vigorous exercise (such as running) each week.  Where do I find calorie information?       The number of calories in a food can be found on a Nutrition Facts label. If a food does not have a Nutrition Facts label, try to look up the calories online or ask your dietitian for help.  Remember that calories are listed per serving. If you choose to have more than one serving of a food, you will have to multiply the calories per serving by the amount of servings you plan to eat. For example, the label on a package of bread might say that a serving size is 1 slice and that there are 90 calories in a serving. If you eat 1 slice, you will have eaten 90 calories. If you eat 2 slices, you will have eaten 180 calories.  How do I keep a food log?  Immediately after each meal, record the following information in your food log:  · What you ate. Don't forget to include toppings, sauces, and other extras on the food.  · How much you ate. This can be measured in cups, ounces, or number of items.  · How many calories each food and drink had.  · The total number of calories in the meal.     Keep your food log near you, such as in a small notebook in your pocket, or use a mobile carey or website. Some programs will calculate calories for you and show you how many calories you have left for the day to meet your goal.  What are some calorie counting tips?  · Use your calories on foods and drinks that will fill you up and not leave you hungry:  ? Some examples of foods that fill you up are nuts and nut butters, vegetables, lean proteins, and high-fiber foods like whole grains. High-fiber foods are foods with more than 5 g fiber per serving.  ? Drinks such as sodas, specialty coffee drinks, alcohol, and juices have a lot of calories, yet do not fill you up.  · Eat nutritious foods and avoid empty calories. Empty calories are calories you get from foods or beverages that do not have many vitamins or protein,  "such as candy, sweets, and soda. It is better to have a nutritious high-calorie food (such as an avocado) than a food with few nutrients (such as a bag of chips).  · Know how many calories are in the foods you eat most often. This will help you calculate calorie counts faster.  · Pay attention to calories in drinks. Low-calorie drinks include water and unsweetened drinks.  · Pay attention to nutrition labels for \"low fat\" or \"fat free\" foods. These foods sometimes have the same amount of calories or more calories than the full fat versions. They also often have added sugar, starch, or salt, to make up for flavor that was removed with the fat.  ·   · Find a way of tracking calories that works for you. Get creative. Try different apps or programs if writing down calories does not work for you.  What are some portion control tips?  · Know how many calories are in a serving. This will help you know how many servings of a certain food you can have.  · Use a measuring cup to measure serving sizes. You could also try weighing out portions on a kitchen scale. With time, you will be able to estimate serving sizes for some foods.  · Take some time to put servings of different foods on your favorite plates, bowls, and cups so you know what a serving looks like.  · Try not to eat straight from a bag or box. Doing this can lead to overeating. Put the amount you would like to eat in a cup or on a plate to make sure you are eating the right portion.  · Use smaller plates, glasses, and bowls to prevent overeating.  · Try not to multitask (for example, watch TV or use your computer) while eating. If it is time to eat, sit down at a table and enjoy your food. This will help you to know when you are full. It will also help you to be aware of what you are eating and how much you are eating.  What are tips for following this plan?  Reading food labels  · Check the calorie count compared to the serving size. The serving size may be " smaller than what you are used to eating.  · Check the source of the calories. Make sure the food you are eating is high in vitamins and protein and low in saturated and trans fats.  Shopping  · Read nutrition labels while you shop. This will help you make healthy decisions before you decide to purchase your food.  · Make a grocery list and stick to it.  Cooking  · Try to cook your favorite foods in a healthier way. For example, try baking instead of frying.  · Use low-fat dairy products.  Meal planning  · Use more fruits and vegetables. Half of your plate should be fruits and vegetables.  · Include lean proteins like poultry and fish.  How do I count calories when eating out?  · Ask for smaller portion sizes.  · Consider sharing an entree and sides instead of getting your own entree.  · If you get your own entree, eat only half. Ask for a box at the beginning of your meal and put the rest of your entree in it so you are not tempted to eat it.  · If calories are listed on the menu, choose the lower calorie options.  · Choose dishes that include vegetables, fruits, whole grains, low-fat dairy products, and lean protein.  · Choose items that are boiled, broiled, grilled, or steamed. Stay away from items that are buttered, battered, fried, or served with cream sauce. Items labeled “crispy” are usually fried, unless stated otherwise.  · Choose water, low-fat milk, unsweetened iced tea, or other drinks without added sugar. If you want an alcoholic beverage, choose a lower calorie option such as a glass of wine or light beer.  · Ask for dressings, sauces, and syrups on the side. These are usually high in calories, so you should limit the amount you eat.  · If you want a salad, choose a garden salad and ask for grilled meats. Avoid extra toppings like nieves, cheese, or fried items. Ask for the dressing on the side, or ask for olive oil and vinegar or lemon to use as dressing.  · Estimate how many servings of a food you are  given. For example, a serving of cooked rice is ½ cup or about the size of half a baseball. Knowing serving sizes will help you be aware of how much food you are eating at restaurants. The list below tells you how big or small some common portion sizes are based on everyday objects:  ? 1 oz--4 stacked dice.  ? 3 oz--1 deck of cards.  ? 1 tsp--1 die.  ? 1 Tbsp--½ a ping-pong ball.  ? 2 Tbsp--1 ping-pong ball.  ? ½ cup--½ baseball.  ? 1 cup--1 baseball.  Summary  · Calorie counting means keeping track of how many calories you eat and drink each day. If you eat fewer calories than your body needs, you should lose weight.  · A healthy amount of weight to lose per week is usually 1-2 lb (0.5-0.9 kg). This usually means reducing your daily calorie intake by 500-750 calories.  · The number of calories in a food can be found on a Nutrition Facts label. If a food does not have a Nutrition Facts label, try to look up the calories online or ask your dietitian for help.  · Use your calories on foods and drinks that will fill you up, and not on foods and drinks that will leave you hungry.  · Use smaller plates, glasses, and bowls to prevent overeating.      This information is not intended to replace advice given to you by your health care provider. Make sure you discuss any questions you have with your health care provider.  Document Released: 12/18/2006 Document Revised: 11/17/2017 Document Reviewed: 11/17/2017  [employer] Interactive Patient Education © 2018 [employer]

## 2019-02-25 NOTE — PROGRESS NOTES
"Subjective    Dom Marin is a 53 y.o. male here for:    Chief Complaint   Patient presents with   • Hyperlipidemia     6 mo f/u pt states no problems or concerns   • Obesity     f/u       Hyperlipidemia   This is a chronic problem. The current episode started more than 1 year ago. The problem is uncontrolled. Recent lipid tests were reviewed and are variable. Exacerbating diseases include obesity. He has no history of diabetes (patient has pre-diabetes). Factors aggravating his hyperlipidemia include fatty foods. Associated symptoms include myalgias (cramps). Current antihyperlipidemic treatment includes statins. Compliance problems include adherence to diet and adherence to exercise.  Risk factors for coronary artery disease include obesity, male sex, stress and dyslipidemia.   Obesity   This is a recurrent problem. The current episode started more than 1 year ago. The problem occurs daily. The problem has been unchanged. Associated symptoms include fatigue and myalgias (cramps).          The following portions of the patient's history were reviewed and updated as appropriate: allergies, current medications, past family history, past medical history, past social history, past surgical history and problem list.    Health Maintenance   Topic Date Due   • ANNUAL PHYSICAL  11/26/1967   • TDAP/TD VACCINES (1 - Tdap) 11/26/1983   • ZOSTER VACCINE (1 of 2) 11/26/2014   • LIPID PANEL  08/24/2019   • COLONOSCOPY  01/01/2025   • HEPATITIS C SCREENING  Completed   • INFLUENZA VACCINE  Completed       Review of Systems   Constitutional: Positive for activity change and fatigue.   Musculoskeletal: Positive for myalgias (cramps).   Psychiatric/Behavioral: Positive for sleep disturbance.       Vitals:    02/25/19 0800   BP: 134/86   Pulse: 71   Resp: 16   Temp: 97.2 °F (36.2 °C)   TempSrc: Temporal   SpO2: 96%   Weight: 124 kg (273 lb)   Height: 176.5 cm (69.49\")         Objective   Physical Exam   Constitutional: He is " oriented to person, place, and time. Vital signs are normal. He appears well-developed and well-nourished. He is active.  Non-toxic appearance. He does not have a sickly appearance. He does not appear ill. No distress. He is obese.  HENT:   Head: Normocephalic and atraumatic.   Right Ear: Hearing and external ear normal.   Left Ear: Hearing and external ear normal.   Nose: Nose normal.   Mouth/Throat: Oropharynx is clear and moist. Mucous membranes are not dry.   Eyes: Conjunctivae, EOM and lids are normal. No scleral icterus.   Neck: Phonation normal. Neck supple. No tracheal deviation present.   Cardiovascular: Normal rate, regular rhythm and normal heart sounds. Exam reveals no gallop and no friction rub.   No murmur heard.  Pulmonary/Chest: Effort normal and breath sounds normal.   Musculoskeletal: He exhibits no deformity.   Neurological: He is alert and oriented to person, place, and time. He displays no tremor. No cranial nerve deficit. He exhibits normal muscle tone. Gait normal.   Skin: Skin is warm. No rash noted. He is not diaphoretic. No cyanosis. No pallor. Nails show no clubbing.   Psychiatric: He has a normal mood and affect. His speech is normal and behavior is normal. Judgment and thought content normal. Cognition and memory are normal.   Nursing note and vitals reviewed.       Lab Results   Component Value Date    CHLPL 186 08/24/2018    TRIG 100 08/24/2018    HDL 49 08/24/2018     (H) 08/24/2018         Assessment/Plan     Problem List Items Addressed This Visit        Cardiovascular and Mediastinum    Hyperlipidemia LDL goal <130 - Primary    Relevant Medications    simvastatin (ZOCOR) 40 MG tablet    Other Relevant Orders    Comprehensive Metabolic Panel    Lipid Panel       Digestive    Class 2 severe obesity with serious comorbidity and body mass index (BMI) of 39.0 to 39.9 in adult (CMS/[facility])    Overview     · Associated with hyperlipidemia          Relevant Orders    Comprehensive  Metabolic Panel    CBC & Differential    T4, Free    TSH      Other Visit Diagnoses     Elevated hemoglobin A1c        Relevant Orders    Hemoglobin A1c    Thyroid disorder screen        Relevant Orders    T4, Free    TSH          · Patient's Body mass index is 39.75 kg/m². BMI is above normal parameters. Recommendations include: educational material, exercise counseling and nutrition counseling.  ·     Return in about 366 days (around 2/26/2020) for Annual physical. or sooner if needed. Will need to see sooner than one year if labs show need/abnormalities.    Suma Zurita MD

## 2019-09-04 DIAGNOSIS — E78.5 HYPERLIPIDEMIA LDL GOAL <130: ICD-10-CM

## 2019-09-04 RX ORDER — SIMVASTATIN 40 MG
40 TABLET ORAL NIGHTLY
Qty: 90 TABLET | Refills: 0 | Status: SHIPPED | OUTPATIENT
Start: 2019-09-04 | End: 2019-12-08 | Stop reason: SDUPTHER

## 2019-12-08 DIAGNOSIS — E78.5 HYPERLIPIDEMIA LDL GOAL <130: ICD-10-CM

## 2019-12-09 RX ORDER — SIMVASTATIN 40 MG
40 TABLET ORAL NIGHTLY
Qty: 90 TABLET | Refills: 0 | Status: SHIPPED | OUTPATIENT
Start: 2019-12-09 | End: 2020-02-18 | Stop reason: SDUPTHER

## 2020-02-18 ENCOUNTER — OFFICE VISIT (OUTPATIENT)
Dept: INTERNAL MEDICINE | Facility: CLINIC | Age: 55
End: 2020-02-18

## 2020-02-18 VITALS
RESPIRATION RATE: 18 BRPM | TEMPERATURE: 96.9 F | WEIGHT: 269.5 LBS | BODY MASS INDEX: 39.92 KG/M2 | SYSTOLIC BLOOD PRESSURE: 122 MMHG | HEIGHT: 69 IN | HEART RATE: 80 BPM | DIASTOLIC BLOOD PRESSURE: 80 MMHG | OXYGEN SATURATION: 95 %

## 2020-02-18 DIAGNOSIS — M54.12 CERVICAL RADICULAR PAIN: ICD-10-CM

## 2020-02-18 DIAGNOSIS — R22.1 NECK MASS: ICD-10-CM

## 2020-02-18 DIAGNOSIS — R09.81 SINUS CONGESTION: ICD-10-CM

## 2020-02-18 DIAGNOSIS — Z23 NEED FOR TDAP VACCINATION: ICD-10-CM

## 2020-02-18 DIAGNOSIS — Z12.5 PROSTATE CANCER SCREENING: ICD-10-CM

## 2020-02-18 DIAGNOSIS — Z23 NEED FOR INFLUENZA VACCINATION: ICD-10-CM

## 2020-02-18 DIAGNOSIS — R73.09 ELEVATED HEMOGLOBIN A1C: ICD-10-CM

## 2020-02-18 DIAGNOSIS — Z00.00 ANNUAL PHYSICAL EXAM: Primary | ICD-10-CM

## 2020-02-18 DIAGNOSIS — E78.5 HYPERLIPIDEMIA LDL GOAL <130: ICD-10-CM

## 2020-02-18 DIAGNOSIS — R79.89 ABNORMAL THYROID BLOOD TEST: ICD-10-CM

## 2020-02-18 PROCEDURE — 90674 CCIIV4 VAC NO PRSV 0.5 ML IM: CPT | Performed by: FAMILY MEDICINE

## 2020-02-18 PROCEDURE — 90471 IMMUNIZATION ADMIN: CPT | Performed by: FAMILY MEDICINE

## 2020-02-18 PROCEDURE — 90472 IMMUNIZATION ADMIN EACH ADD: CPT | Performed by: FAMILY MEDICINE

## 2020-02-18 PROCEDURE — 90715 TDAP VACCINE 7 YRS/> IM: CPT | Performed by: FAMILY MEDICINE

## 2020-02-18 PROCEDURE — 99396 PREV VISIT EST AGE 40-64: CPT | Performed by: FAMILY MEDICINE

## 2020-02-18 RX ORDER — MOMETASONE FUROATE 50 UG/1
2 SPRAY, METERED NASAL DAILY
Qty: 3 EACH | Refills: 3 | Status: SHIPPED | OUTPATIENT
Start: 2020-02-18 | End: 2021-02-09

## 2020-02-18 RX ORDER — SIMVASTATIN 40 MG
40 TABLET ORAL NIGHTLY
Qty: 90 TABLET | Refills: 3 | Status: SHIPPED | OUTPATIENT
Start: 2020-02-18 | End: 2020-12-04 | Stop reason: SDUPTHER

## 2020-02-18 NOTE — PATIENT INSTRUCTIONS
Health Maintenance, Male  A healthy lifestyle and preventive care is important for your health and wellness. Ask your health care provider about what schedule of regular examinations is right for you.  What should I know about weight and diet?    Eat a Healthy Diet  · Eat plenty of vegetables, fruits, whole grains, low-fat dairy products, and lean protein.  · Do not eat a lot of foods high in solid fats, added sugars, or salt.     Maintain a Healthy Weight  Regular exercise can help you achieve or maintain a healthy weight. You should:  · Do at least 150 minutes of exercise each week. The exercise should increase your heart rate and make you sweat (moderate-intensity exercise).  · Do strength-training exercises at least twice a week.     Watch Your Levels of Cholesterol and Blood Lipids  · Have your blood tested for lipids and cholesterol every 5 years starting at 35 years of age. If you are at high risk for heart disease, you should start having your blood tested when you are 20 years old. You may need to have your cholesterol levels checked more often if:  ? Your lipid or cholesterol levels are high.  ? You are older than 50 years of age.  ? You are at high risk for heart disease.     What should I know about cancer screening?  Many types of cancers can be detected early and may often be prevented.  Lung Cancer  · You should be screened every year for lung cancer if:  ? You are a current smoker who has smoked for at least 30 years.  ? You are a former smoker who has quit within the past 15 years.  · Talk to your health care provider about your screening options, when you should start screening, and how often you should be screened.     Colorectal Cancer  · Routine colorectal cancer screening usually begins at 50 years of age and should be repeated every 5-10 years until you are 75 years old. You may need to be screened more often if early forms of precancerous polyps or small growths are found. Your health care  provider may recommend screening at an earlier age if you have risk factors for colon cancer.  · Your health care provider may recommend using home test kits to check for hidden blood in the stool.  · A small camera at the end of a tube can be used to examine your colon (sigmoidoscopy or colonoscopy). This checks for the earliest forms of colorectal cancer.     Prostate and Testicular Cancer  · Depending on your age and overall health, your health care provider may do certain tests to screen for prostate and testicular cancer.  · Talk to your health care provider about any symptoms or concerns you have about testicular or prostate cancer.     Skin Cancer  · Check your skin from head to toe regularly.  · Tell your health care provider about any new moles or changes in moles, especially if:  ? There is a change in a mole’s size, shape, or color.  ? You have a mole that is larger than a pencil eraser.  · Always use sunscreen. Apply sunscreen liberally and repeat throughout the day.  · Protect yourself by wearing long sleeves, pants, a wide-brimmed hat, and sunglasses when outside.     What should I know about heart disease, diabetes, and high blood pressure?  · If you are 18-39 years of age, have your blood pressure checked every 3-5 years. If you are 40 years of age or older, have your blood pressure checked every year. You should have your blood pressure measured twice--once when you are at a hospital or clinic, and once when you are not at a hospital or clinic. Record the average of the two measurements. To check your blood pressure when you are not at a hospital or clinic, you can use:  ? An automated blood pressure machine at a pharmacy.  ? A home blood pressure monitor.  · Talk to your health care provider about your target blood pressure.  · If you are between 45-79 years old, ask your health care provider if you should take aspirin to prevent heart disease.  · Have regular diabetes screenings by checking your  fasting blood sugar level.  ? If you are at a normal weight and have a low risk for diabetes, have this test once every three years after the age of 45.  ? If you are overweight and have a high risk for diabetes, consider being tested at a younger age or more often.  · A one-time screening for abdominal aortic aneurysm (AAA) by ultrasound is recommended for men aged 65-75 years who are current or former smokers.  What should I know about preventing infection?  Hepatitis B  If you have a higher risk for hepatitis B, you should be screened for this virus. Talk with your health care provider to find out if you are at risk for hepatitis B infection.  Hepatitis C  Blood testing is recommended for:  · Everyone born from 1945 through 1965.  · Anyone with known risk factors for hepatitis C.     Sexually Transmitted Diseases (STDs)  · You should be screened each year for STDs including gonorrhea and chlamydia if:  ? You are sexually active and are younger than 24 years of age.  ? You are older than 24 years of age and your health care provider tells you that you are at risk for this type of infection.  ? Your sexual activity has changed since you were last screened and you are at an increased risk for chlamydia or gonorrhea. Ask your health care provider if you are at risk.  · Talk with your health care provider about whether you are at high risk of being infected with HIV. Your health care provider may recommend a prescription medicine to help prevent HIV infection.     What else can I do?  ·   · Schedule regular health, dental, and eye exams.  · Stay current with your vaccines (immunizations).  · Do not use any tobacco products, such as cigarettes, chewing tobacco, and e-cigarettes. If you need help quitting, ask your health care provider.  · Limit alcohol intake to no more than 2 drinks per day. One drink equals 12 ounces of beer, 5 ounces of wine, or 1½ ounces of hard liquor.  · Do not use street drugs.  · Do not share  needles.  · Ask your health care provider for help if you need support or information about quitting drugs.  · Tell your health care provider if you often feel depressed.  · Tell your health care provider if you have ever been abused or do not feel safe at home.      This information is not intended to replace advice given to you by your health care provider. Make sure you discuss any questions you have with your health care provider.  Document Released: 06/15/2009 Document Revised: 08/16/2017 Document Reviewed: 09/20/2016  [employer] Interactive Patient Education © 2018 [employer]       Serving Sizes  A serving size is a measured amount of food or drink, such as one slice of bread, that has an associated nutrient content. Knowing the serving size of a food or drink can help you determine how much of that food you should consume.  What is the size of one serving?  The size of one healthy serving depends on the food or drink. To determine a serving size, read the food label. If the food or drink does not have a food label, try to find serving size information online. Or, use the following to estimate the size of one adult serving:  Grain  1 slice bread. ½ bagel. ½ cup pasta.  Vegetable  ½ cup cooked or canned vegetables. 1 cup raw, leafy greens.  Fruit  ½ cup canned fruit. 1 medium fruit. ¼ cup dried fruit.  Meat and Other Protein Sources  1 oz meat, poultry, or fish. ¼ cup cooked beans. 1 egg. ¼ cup nuts or seeds. 1 Tbsp nut butter. ¼ cup tofu or tempeh. 2 Tbsp hummus.  Dairy  An individual container of yogurt (6-8 oz). 1 piece of cheese the size of your thumb (1 oz). 1 cup (8 oz) milk or milk alternative.  Fat  A piece the size of one dice. 1 tsp soft margarine. 1 Tbsp mayonnaise. 1 tsp vegetable oil. 1 Tbsp regular salad dressing. 2 Tbsp low-fat salad dressing.  How many servings should I eat from each food group each day?  The following are the suggested number of servings to try and have every day from each  food group. You can also look at your eating throughout the week and aim for meeting these requirements on most days for overall healthy eating.  Grain  6-8 servings. Try to have half of your grains from whole grains, such as whole wheat bread, corn tortillas, oatmeal, brown rice, whole wheat pasta, and bulgur.  Vegetable  At least 2½-3 servings.  Fruit  2 servings.  Meat and Other Protein Foods  5-6 servings. Aim to have lean proteins, such as chicken, turkey, fish, beans, or tofu.  Dairy  3 servings. Choose low-fat or nonfat if you are trying to control your weight.  Fat  2-3 servings.  Is a serving the same thing as a portion?  No. A portion is the actual amount you eat, which may be more than one serving. Knowing the specific serving size of a food and the nutritional information that goes with it can help you make a healthy decision on what size portion to eat.  What are some tips to help me learn healthy serving sizes?  · Check food labels for serving sizes. Many foods that come as a single portion actually contain multiple servings.  · Determine the serving size of foods you commonly eat and figure out how large a portion you usually eat.  · Measure the number of servings that can be held by the bowls, glasses, cups, and plates you typically use. For example, pour your breakfast cereal into your regular bowl and then pour it into a measuring cup.  · For 1-2 days, measure the serving sizes of all the foods you eat.  · Practice estimating serving sizes and determining how big your portions should be.      This information is not intended to replace advice given to you by your health care provider. Make sure you discuss any questions you have with your health care provider.  Document Released: 09/15/2004 Document Revised: 08/12/2017 Document Reviewed: 03/17/2015  [employer] Interactive Patient Education © 2018 [employer]    MyPlate from [id number]    The general, healthful diet is based on the 2010 Dietary Guidelines  for Americans. The amount of food you need to eat from each food group depends on your age, sex, and level of physical activity and can be individualized by a dietitian. Go to ChooseMyPlate.gov for more information.  What do I need to know about the MyPlate plan?  · Enjoy your food, but eat less.  · Avoid oversized portions.  ? ½ of your plate should include fruits and vegetables.  ? ¼ of your plate should be grains.  ? ¼ of your plate should be protein.  Grains  · Make at least half of your grains whole grains.  · For a 2,000 calorie daily food plan, eat 6 oz every day.  · 1 oz is about 1 slice bread, 1 cup cereal, or ½ cup cooked rice, cereal, or pasta.  Vegetables  · Make half your plate fruits and vegetables.  · For a 2,000 calorie daily food plan, eat 2½ cups every day.  · 1 cup is about 1 cup raw or cooked vegetables or vegetable juice or 2 cups raw leafy greens.  Fruits  · Make half your plate fruits and vegetables.  · For a 2,000 calorie daily food plan, eat 2 cups every day.  · 1 cup is about 1 cup fruit or 100% fruit juice or ½ cup dried fruit.  Protein  · For a 2,000 calorie daily food plan, eat 5½ oz every day.  · 1 oz is about 1 oz meat, poultry, or fish, ¼ cup cooked beans, 1 egg, 1 Tbsp peanut butter, or ½ oz nuts or seeds.  Dairy  · Switch to fat-free or low-fat (1%) milk.  · For a 2,000 calorie daily food plan, eat 3 cups every day.  · 1 cup is about 1 cup milk or yogurt or soy milk (soy beverage), 1½ oz natural cheese, or 2 oz processed cheese.  Fats, Oils, and Empty Calories  · Only small amounts of oils are recommended.  · Empty calories are calories from solid fats or added sugars.  · Compare sodium in foods like soup, bread, and frozen meals. Choose the foods with lower numbers.  · Drink water instead of sugary drinks.  What foods can I eat?  Grains  Whole grains such as whole wheat, quinoa, millet, and bulgur. Bread, rolls, and pasta made from whole grains. Brown or wild rice. Hot or cold  cereals made from whole grains and without added sugar.  Vegetables  All fresh vegetables, especially fresh red, dark green, or orange vegetables. Peas and beans. Low-sodium frozen or canned vegetables prepared without added salt. Low-sodium vegetable juices.  Fruits  All fresh, frozen, and dried fruits. Canned fruit packed in water or fruit juice without added sugar. Fruit juices without added sugar.  Meats and Other Protein Sources  Boiled, baked, or grilled lean meat trimmed of fat. Skinless poultry. Fresh seafood and shellfish. Canned seafood packed in water. Unsalted nuts and unsalted nut butters. Tofu. Dried beans and pea. Eggs.  Dairy  Low-fat or fat-free milk, yogurt, and cheeses.  Sweets and Desserts  Frozen desserts made from low-fat milk.  Fats and Oils  Olive, peanut, and canola oils and margarine. Salad dressing and mayonnaise made from these oils.  Other  Soups and casseroles made from allowed ingredients and without added fat or salt.  The items listed above may not be a complete list of recommended foods or beverages. Contact your dietitian for more options.  What foods are not recommended?  Grains  Sweetened, low-fiber cereals. Packaged baked goods. Snack crackers and chips. Cheese crackers, butter crackers, and biscuits. Frozen waffles, sweet breads, doughnuts, pastries, packaged baking mixes, pancakes, cakes, and cookies.  Vegetables  Regular canned or frozen vegetables or vegetables prepared with salt. Canned tomatoes. Canned tomato sauce. Fried vegetables. Vegetables in cream sauce or cheese sauce.  Fruits  Fruits packed in syrup or made with added sugar.  Meats and Other Protein Sources  Marbled or fatty meats such as ribs. Poultry with skin. Fried meats, poultry, eggs, or fish. Sausages, hot dogs, and deli meats such as pastrami, bologna, or salami.  Dairy  Whole milk, cream, cheeses made from whole milk, sour cream. Ice cream or yogurt made from whole milk or with added sugar.  Beverages  For  adults, no more than one alcoholic drink per day. Regular soft drinks or other sugary beverages. Juice drinks.  Sweets and Desserts  Sugary or fatty desserts, candy, and other sweets.  Fats and Oils  Solid shortening or partially hydrogenated oils. Solid margarine. Margarine that contains trans fats. Butter.    The items listed above may not be a complete list of foods and beverages to avoid. Contact your dietitian for more information.    This information is not intended to replace advice given to you by your health care provider. Make sure you discuss any questions you have with your health care provider.  Document Released: 01/06/2009 Document Revised: 05/25/2017 Document Reviewed: 11/26/2014  [employer] Interactive Patient Education © 2018 [employer]        Calorie Counting for Weight Loss  Calories are units of energy. Your body needs a certain amount of calories from food to keep you going throughout the day. When you eat more calories than your body needs, your body stores the extra calories as fat. When you eat fewer calories than your body needs, your body burns fat to get the energy it needs.  Calorie counting means keeping track of how many calories you eat and drink each day. Calorie counting can be helpful if you need to lose weight. If you make sure to eat fewer calories than your body needs, you should lose weight. Ask your health care provider what a healthy weight is for you.  For calorie counting to work, you will need to eat the right number of calories in a day in order to lose a healthy amount of weight per week. A dietitian can help you determine how many calories you need in a day and will give you suggestions on how to reach your calorie goal.  · A healthy amount of weight to lose per week is usually 1-2 lb (0.5-0.9 kg). This usually means that your daily calorie intake should be reduced by 500-750 calories.  · Eating 1,200 - 1,500 calories per day can help most women lose weight.  · Eating  1,500 - 1,800 calories per day can help most men lose weight.     What do I need to know about calorie counting?  In order to meet your daily calorie goal, you will need to:  · Find out how many calories are in each food you would like to eat. Try to do this before you eat.  · Decide how much of the food you plan to eat.  · Write down what you ate and how many calories it had. Doing this is called keeping a food log.     To successfully lose weight, it is important to balance calorie counting with a healthy lifestyle that includes regular activity. Aim for 150 minutes of moderate exercise (such as walking) or 75 minutes of vigorous exercise (such as running) each week.  Where do I find calorie information?       The number of calories in a food can be found on a Nutrition Facts label. If a food does not have a Nutrition Facts label, try to look up the calories online or ask your dietitian for help.  Remember that calories are listed per serving. If you choose to have more than one serving of a food, you will have to multiply the calories per serving by the amount of servings you plan to eat. For example, the label on a package of bread might say that a serving size is 1 slice and that there are 90 calories in a serving. If you eat 1 slice, you will have eaten 90 calories. If you eat 2 slices, you will have eaten 180 calories.  How do I keep a food log?  Immediately after each meal, record the following information in your food log:  · What you ate. Don't forget to include toppings, sauces, and other extras on the food.  · How much you ate. This can be measured in cups, ounces, or number of items.  · How many calories each food and drink had.  · The total number of calories in the meal.     Keep your food log near you, such as in a small notebook in your pocket, or use a mobile carey or website. Some programs will calculate calories for you and show you how many calories you have left for the day to meet your  "goal.  What are some calorie counting tips?  · Use your calories on foods and drinks that will fill you up and not leave you hungry:  ? Some examples of foods that fill you up are nuts and nut butters, vegetables, lean proteins, and high-fiber foods like whole grains. High-fiber foods are foods with more than 5 g fiber per serving.  ? Drinks such as sodas, specialty coffee drinks, alcohol, and juices have a lot of calories, yet do not fill you up.  · Eat nutritious foods and avoid empty calories. Empty calories are calories you get from foods or beverages that do not have many vitamins or protein, such as candy, sweets, and soda. It is better to have a nutritious high-calorie food (such as an avocado) than a food with few nutrients (such as a bag of chips).  · Know how many calories are in the foods you eat most often. This will help you calculate calorie counts faster.  · Pay attention to calories in drinks. Low-calorie drinks include water and unsweetened drinks.  · Pay attention to nutrition labels for \"low fat\" or \"fat free\" foods. These foods sometimes have the same amount of calories or more calories than the full fat versions. They also often have added sugar, starch, or salt, to make up for flavor that was removed with the fat.  ·   · Find a way of tracking calories that works for you. Get creative. Try different apps or programs if writing down calories does not work for you.  What are some portion control tips?  · Know how many calories are in a serving. This will help you know how many servings of a certain food you can have.  · Use a measuring cup to measure serving sizes. You could also try weighing out portions on a kitchen scale. With time, you will be able to estimate serving sizes for some foods.  · Take some time to put servings of different foods on your favorite plates, bowls, and cups so you know what a serving looks like.  · Try not to eat straight from a bag or box. Doing this can lead to " overeating. Put the amount you would like to eat in a cup or on a plate to make sure you are eating the right portion.  · Use smaller plates, glasses, and bowls to prevent overeating.  · Try not to multitask (for example, watch TV or use your computer) while eating. If it is time to eat, sit down at a table and enjoy your food. This will help you to know when you are full. It will also help you to be aware of what you are eating and how much you are eating.  What are tips for following this plan?  Reading food labels  · Check the calorie count compared to the serving size. The serving size may be smaller than what you are used to eating.  · Check the source of the calories. Make sure the food you are eating is high in vitamins and protein and low in saturated and trans fats.  Shopping  · Read nutrition labels while you shop. This will help you make healthy decisions before you decide to purchase your food.  · Make a grocery list and stick to it.  Cooking  · Try to cook your favorite foods in a healthier way. For example, try baking instead of frying.  · Use low-fat dairy products.  Meal planning  · Use more fruits and vegetables. Half of your plate should be fruits and vegetables.  · Include lean proteins like poultry and fish.  How do I count calories when eating out?  · Ask for smaller portion sizes.  · Consider sharing an entree and sides instead of getting your own entree.  · If you get your own entree, eat only half. Ask for a box at the beginning of your meal and put the rest of your entree in it so you are not tempted to eat it.  · If calories are listed on the menu, choose the lower calorie options.  · Choose dishes that include vegetables, fruits, whole grains, low-fat dairy products, and lean protein.  · Choose items that are boiled, broiled, grilled, or steamed. Stay away from items that are buttered, battered, fried, or served with cream sauce. Items labeled “crispy” are usually fried, unless stated  otherwise.  · Choose water, low-fat milk, unsweetened iced tea, or other drinks without added sugar. If you want an alcoholic beverage, choose a lower calorie option such as a glass of wine or light beer.  · Ask for dressings, sauces, and syrups on the side. These are usually high in calories, so you should limit the amount you eat.  · If you want a salad, choose a garden salad and ask for grilled meats. Avoid extra toppings like nieves, cheese, or fried items. Ask for the dressing on the side, or ask for olive oil and vinegar or lemon to use as dressing.  · Estimate how many servings of a food you are given. For example, a serving of cooked rice is ½ cup or about the size of half a baseball. Knowing serving sizes will help you be aware of how much food you are eating at restaurants. The list below tells you how big or small some common portion sizes are based on everyday objects:  ? 1 oz--4 stacked dice.  ? 3 oz--1 deck of cards.  ? 1 tsp--1 die.  ? 1 Tbsp--½ a ping-pong ball.  ? 2 Tbsp--1 ping-pong ball.  ? ½ cup--½ baseball.  ? 1 cup--1 baseball.  Summary  · Calorie counting means keeping track of how many calories you eat and drink each day. If you eat fewer calories than your body needs, you should lose weight.  · A healthy amount of weight to lose per week is usually 1-2 lb (0.5-0.9 kg). This usually means reducing your daily calorie intake by 500-750 calories.  · The number of calories in a food can be found on a Nutrition Facts label. If a food does not have a Nutrition Facts label, try to look up the calories online or ask your dietitian for help.  · Use your calories on foods and drinks that will fill you up, and not on foods and drinks that will leave you hungry.  · Use smaller plates, glasses, and bowls to prevent overeating.      This information is not intended to replace advice given to you by your health care provider. Make sure you discuss any questions you have with your health care  provider.  Document Released: 12/18/2006 Document Revised: 11/17/2017 Document Reviewed: 11/17/2017  [employer] Interactive Patient Education © 2018 [employer]

## 2020-02-18 NOTE — PROGRESS NOTES
02/18/2020  Chief Complaint   Patient presents with   • Annual Exam     Physical   • Shoulder Pain     Left shoulder pain for about 6 months. Thinks it may be due to harness from gas mask at work.    • Sinusitis     Started feeling congestion in the facial area for a few weeks. Denies throat or ear pain or cough.        Dom Marin is here for his annual preventive exam. History per MA reviewed.     Uses so-clean for CPAP. Nasal congestion. No tooth pain. No fevers. Nasal congestion for a few weeks. Overall does not feel sick.    Shoulder pain left recurrent, possibly due to carrying things at work. Has made modifications there and wants to see if symptoms improve. Has a tingling pain that comes from neck down to hand, worsened by certain positions. No chest pain. History of mass on left side of neck, but patient does not feel it's deep enough to be causing these symptoms. Lesion has not changed in size he feels. Pain in shoulder not worse at night.     Dom Marin has the following medical issues:  Patient Active Problem List    Diagnosis   • Class 2 severe obesity with serious comorbidity and body mass index (BMI) of 39.0 to 39.9 in adult (CMS/HCC) [E66.01, Z68.39]   • Hyperlipidemia LDL goal <130 [E78.5]       Health Maintenance   Topic Date Due   • TDAP/TD VACCINES (1 - Tdap) 11/26/1975   • INFLUENZA VACCINE  08/01/2019   • ZOSTER VACCINE (1 of 2) 03/05/2020 (Originally 11/26/2014)   • LIPID PANEL  02/25/2020   • ANNUAL PHYSICAL  02/19/2021   • COLONOSCOPY  01/01/2025   • HEPATITIS C SCREENING  Completed       Immunization History   Administered Date(s) Administered   • Tdap 02/18/2020   • flucelvax quad pfs =>4 YRS 02/18/2020       Review of Systems   Constitutional: Negative for activity change and fever.   HENT: Positive for congestion. Negative for sinus pressure and sore throat.    Respiratory: Negative for shortness of breath.    Cardiovascular: Negative for chest pain.   Gastrointestinal: Negative for  "abdominal pain.   Musculoskeletal: Positive for arthralgias and neck pain.   All other systems reviewed and are negative.      The following portions of the patient's history were reviewed and updated as appropriate: allergies, current medications, past family history, past medical history, past social history, past surgical history and problem list.    Objective   Visit Vitals  /80   Pulse 80   Temp 96.9 °F (36.1 °C)   Resp 18   Ht 176.5 cm (69.49\")   Wt 122 kg (269 lb 8 oz)   SpO2 95%   BMI 39.24 kg/m²        Physical Exam   Constitutional: He is oriented to person, place, and time. Vital signs are normal. He appears well-developed and well-nourished. He is active.  Non-toxic appearance. He does not have a sickly appearance. He does not appear ill. No distress. He is obese.  HENT:   Head: Normocephalic and atraumatic. Hair is normal.   Right Ear: Hearing, tympanic membrane, external ear and ear canal normal.   Left Ear: Hearing, tympanic membrane, external ear and ear canal normal.   Nose: Nose normal.   Mouth/Throat: Uvula is midline, oropharynx is clear and moist and mucous membranes are normal. Normal dentition. No oropharyngeal exudate.   Eyes: Pupils are equal, round, and reactive to light. Conjunctivae, EOM and lids are normal. Right eye exhibits no discharge. Left eye exhibits no discharge. No scleral icterus.   Neck: Trachea normal and phonation normal. Neck supple. No tracheal deviation present. No thyromegaly present.       + Spurling left, - right   Cardiovascular: Normal rate, regular rhythm and normal heart sounds. Exam reveals no gallop and no friction rub.   No murmur heard.  Pulmonary/Chest: Effort normal and breath sounds normal. He exhibits no tenderness.   Abdominal: Soft. Bowel sounds are normal. He exhibits no distension and no mass. There is no hepatosplenomegaly. There is no tenderness. There is no rigidity, no rebound and no guarding.   Musculoskeletal: He exhibits no edema, " tenderness or deformity.        Right shoulder: He exhibits normal range of motion.        Left shoulder: He exhibits normal range of motion.   Lymphadenopathy:        Head (right side): No submandibular adenopathy present.        Head (left side): No submandibular adenopathy present.     He has no cervical adenopathy.   Neurological: He is alert and oriented to person, place, and time. He has normal strength. He displays no atrophy and no tremor. No cranial nerve deficit. He exhibits normal muscle tone. He displays no seizure activity. Gait normal.   Skin: Skin is warm and intact. Capillary refill takes less than 2 seconds. Turgor is normal. No rash noted. He is not diaphoretic. No cyanosis. No pallor. Nails show no clubbing.   Psychiatric: He has a normal mood and affect. His speech is normal and behavior is normal. Judgment and thought content normal. Cognition and memory are normal.   Nursing note and vitals reviewed.      Lab Results   Component Value Date    CHLPL 112 02/25/2019    TRIG 155 (H) 02/25/2019    HDL 38 (L) 02/25/2019    LDL 43 02/25/2019     Lab Results   Component Value Date    TSH 3.490 02/25/2019     Lab Results   Component Value Date    FREET4 0.71 (L) 02/25/2019     Lab Results   Component Value Date    HGBA1C 6.90 02/25/2019    HGBA1C 6.70 08/24/2018    HGBA1C 6.40 01/24/2018       Assessment     Problem List Items Addressed This Visit        Cardiovascular and Mediastinum    Hyperlipidemia LDL goal <130    Relevant Medications    simvastatin (ZOCOR) 40 MG tablet    Other Relevant Orders    Lipid Panel    Comprehensive Metabolic Panel      Other Visit Diagnoses     Annual physical exam    -  Primary    Relevant Orders    CBC (No Diff)    Comprehensive Metabolic Panel    Elevated hemoglobin A1c        discussed need for follow up in approx 3 months if a1c in diabetic range.    Relevant Orders    Hemoglobin A1c    Abnormal thyroid blood test        Relevant Orders    TSH+Free T4    Neck mass         reviewed ultrasound report, if lesion changes need to consider CT or MRI    Cervical radicular pain        if worsens consider PT and/or MRI neck    Sinus congestion        nasonex or similar daily, if symptoms worsen and more consistent with sinusits he may reach out and i'll send augmentin or cefdinir.    Relevant Medications    mometasone (NASONEX) 50 MCG/ACT nasal spray    Prostate cancer screening        Relevant Orders    PSA Screen    Need for Tdap vaccination        Relevant Orders    Tdap Vaccine Greater Than or Equal To 8yo IM (Completed)    Need for influenza vaccination        Relevant Orders    Flucelvax Quad=>4Years ([id number]) (Completed)          · Health maintenance information provided with patient plan.   · Counseled on age appropriate health screenings.  · Encourage healthy habits such as exercise, healthy diet.  Patient's Body mass index is 39.24 kg/m². BMI is above normal parameters. Recommendations include: educational material.  ·     Follow up in one year for next annual check up or sooner if needed.    Suma Zurita MD

## 2020-02-19 LAB
ALBUMIN SERPL-MCNC: 4.4 G/DL (ref 3.5–5.2)
ALBUMIN/GLOB SERPL: 1.4 G/DL
ALP SERPL-CCNC: 78 U/L (ref 39–117)
ALT SERPL-CCNC: 25 U/L (ref 1–41)
AST SERPL-CCNC: 19 U/L (ref 1–40)
BILIRUB SERPL-MCNC: 0.6 MG/DL (ref 0.2–1.2)
BUN SERPL-MCNC: 12 MG/DL (ref 6–20)
BUN/CREAT SERPL: 12.5 (ref 7–25)
CALCIUM SERPL-MCNC: 9.6 MG/DL (ref 8.6–10.5)
CHLORIDE SERPL-SCNC: 101 MMOL/L (ref 98–107)
CHOLEST SERPL-MCNC: 122 MG/DL (ref 0–200)
CO2 SERPL-SCNC: 27.4 MMOL/L (ref 22–29)
CREAT SERPL-MCNC: 0.96 MG/DL (ref 0.76–1.27)
ERYTHROCYTE [DISTWIDTH] IN BLOOD BY AUTOMATED COUNT: 12.4 % (ref 12.3–15.4)
GLOBULIN SER CALC-MCNC: 3.2 GM/DL
GLUCOSE SERPL-MCNC: 118 MG/DL (ref 65–99)
HBA1C MFR BLD: 6.9 % (ref 4.8–5.6)
HCT VFR BLD AUTO: 48.5 % (ref 37.5–51)
HDLC SERPL-MCNC: 41 MG/DL (ref 40–60)
HGB BLD-MCNC: 15.8 G/DL (ref 13–17.7)
LDLC SERPL CALC-MCNC: 65 MG/DL (ref 0–100)
MCH RBC QN AUTO: 30.1 PG (ref 26.6–33)
MCHC RBC AUTO-ENTMCNC: 32.6 G/DL (ref 31.5–35.7)
MCV RBC AUTO: 92.4 FL (ref 79–97)
PLATELET # BLD AUTO: 254 10*3/MM3 (ref 140–450)
POTASSIUM SERPL-SCNC: 4.8 MMOL/L (ref 3.5–5.2)
PROT SERPL-MCNC: 7.6 G/DL (ref 6–8.5)
PSA SERPL-MCNC: 0.2 NG/ML (ref 0–4)
RBC # BLD AUTO: 5.25 10*6/MM3 (ref 4.14–5.8)
SODIUM SERPL-SCNC: 141 MMOL/L (ref 136–145)
T4 FREE SERPL-MCNC: 0.78 NG/DL (ref 0.93–1.7)
TRIGL SERPL-MCNC: 78 MG/DL (ref 0–150)
TSH SERPL DL<=0.005 MIU/L-ACNC: 2.65 UIU/ML (ref 0.27–4.2)
VLDLC SERPL CALC-MCNC: 15.6 MG/DL
WBC # BLD AUTO: 9.31 10*3/MM3 (ref 3.4–10.8)

## 2020-02-21 ENCOUNTER — PRIOR AUTHORIZATION (OUTPATIENT)
Dept: INTERNAL MEDICINE | Facility: CLINIC | Age: 55
End: 2020-02-21

## 2020-12-04 ENCOUNTER — OFFICE VISIT (OUTPATIENT)
Dept: INTERNAL MEDICINE | Facility: CLINIC | Age: 55
End: 2020-12-04

## 2020-12-04 VITALS
SYSTOLIC BLOOD PRESSURE: 135 MMHG | RESPIRATION RATE: 18 BRPM | WEIGHT: 277.13 LBS | BODY MASS INDEX: 41.04 KG/M2 | OXYGEN SATURATION: 96 % | HEART RATE: 73 BPM | TEMPERATURE: 98 F | DIASTOLIC BLOOD PRESSURE: 75 MMHG | HEIGHT: 69 IN

## 2020-12-04 DIAGNOSIS — R73.09 ELEVATED HEMOGLOBIN A1C: ICD-10-CM

## 2020-12-04 DIAGNOSIS — E66.01 MORBID OBESITY (HCC): ICD-10-CM

## 2020-12-04 DIAGNOSIS — R79.89 ABNORMAL THYROID BLOOD TEST: ICD-10-CM

## 2020-12-04 DIAGNOSIS — G47.33 OSA (OBSTRUCTIVE SLEEP APNEA): ICD-10-CM

## 2020-12-04 DIAGNOSIS — E78.5 HYPERLIPIDEMIA LDL GOAL <130: Primary | ICD-10-CM

## 2020-12-04 PROCEDURE — 99214 OFFICE O/P EST MOD 30 MIN: CPT | Performed by: FAMILY MEDICINE

## 2020-12-04 RX ORDER — SIMVASTATIN 40 MG
40 TABLET ORAL NIGHTLY
Qty: 90 TABLET | Refills: 3 | Status: SHIPPED | OUTPATIENT
Start: 2020-12-04 | End: 2021-06-08 | Stop reason: SDUPTHER

## 2020-12-04 NOTE — PROGRESS NOTES
Subjective    Dom Marin is a 55 y.o. male here for:  Chief Complaint   Patient presents with   • Sleep Apnea     Pt states his last sleep study was in 2015. His CPAP machine broke and he needs a new order for a new machine.    • Hyperlipidemia     He continues Simvastatin, with the exception of the last month when he ran out. He would like to have his labs checked today.    Answers for HPI/ROS submitted by the patient on 11/27/2020   Diabetes problem  What is the primary reason for your visit?: Diabetes      History per MA reviewed.    Hyperlipidemia chronic x years. Was on simvastatin w/o issue, off for last month. Would like to resume. Lipids were checked last visit per chart review and on medicine controlled.     MILDRED chronic. Needs new CPAP, aware insurance may require updated sleep study.    He feels sugar number on labs will be abnormal, more than before.    Patient submitted info for diabetes on [facility] prior to visit:    Diabetes  The initial diagnosis of diabetes was made 6 months ago. Pertinent negatives for hypoglycemia include no confusion, dizziness, headaches, hunger, mood changes, nervousness/anxiousness, pallor, seizures, sleepiness, speech difficulty, sweats or tremors. Pertinent negatives for diabetes include no blurred vision, no chest pain, no fatigue, no foot paresthesias, no foot ulcerations, no polydipsia, no polyphagia, no polyuria, no visual change, no weakness and no weight loss. Pertinent negatives for hypoglycemia complications include no blackouts, no hospitalization, no nocturnal hypoglycemia, no required assistance and no required glucagon injection. Symptoms are stable. Pertinent negatives for diabetic complications include no CVA, heart disease, impotence, nephropathy, peripheral neuropathy, PVD or retinopathy. Risk factors for coronary artery disease include dyslipidemia and obesity. When asked about current treatments, none were reported. He is compliant with treatment none of  "the time. His weight is stable. He is following a generally unhealthy diet. When asked about meal planning, he reported none. He has not had a previous visit with a dietitian. He rarely participates in exercise. There is no compliance with monitoring of blood glucose. He does not see a podiatrist.Eye exam is not current.          The following portions of the patient's history were reviewed and updated as appropriate: allergies, current medications, past family history, past medical history, past social history, past surgical history and problem list.    Review of Systems   Constitutional: Negative for fatigue and unexpected weight loss.   Eyes: Negative for blurred vision.   Cardiovascular: Negative for chest pain.   Endocrine: Negative for polydipsia, polyphagia and polyuria.   Genitourinary: Negative for impotence.   Skin: Negative for pallor.   Neurological: Negative for dizziness, tremors, seizures, speech difficulty, weakness and confusion.   Psychiatric/Behavioral: The patient is not nervous/anxious.        Visit Vitals  /75   Pulse 73   Temp 98 °F (36.7 °C) (Temporal)   Resp 18   Ht 176.5 cm (69.49\")   Wt 126 kg (277 lb 2 oz)   SpO2 96%   BMI 40.35 kg/m²         Objective   Physical Exam  Vitals signs and nursing note reviewed.   Constitutional:       General: He is not in acute distress.     Appearance: Normal appearance. He is well-developed and well-groomed. He is obese. He is not ill-appearing, toxic-appearing or diaphoretic.      Interventions: Face mask in place.   HENT:      Head: Normocephalic and atraumatic.      Right Ear: Hearing normal.      Left Ear: Hearing normal.   Eyes:      General: Lids are normal. No scleral icterus.     Extraocular Movements: Extraocular movements intact.      Conjunctiva/sclera: Conjunctivae normal.   Cardiovascular:      Rate and Rhythm: Normal rate and regular rhythm.      Heart sounds: Normal heart sounds.   Pulmonary:      Effort: Pulmonary effort is normal. "      Breath sounds: Normal breath sounds and air entry.   Skin:     General: Skin is warm.      Coloration: Skin is not cyanotic, jaundiced or pale.   Neurological:      General: No focal deficit present.      Mental Status: He is alert and oriented to person, place, and time.      Cranial Nerves: No dysarthria.      Motor: No abnormal muscle tone or seizure activity.      Gait: Gait is intact.   Psychiatric:         Attention and Perception: Attention and perception normal.         Mood and Affect: Mood and affect normal.         Speech: Speech normal.         Behavior: Behavior normal. Behavior is cooperative.         Thought Content: Thought content normal.         Cognition and Memory: Cognition and memory normal.         Judgment: Judgment normal.         Lab Results   Component Value Date    CHLPL 122 02/18/2020    TRIG 78 02/18/2020    HDL 41 02/18/2020    LDL 65 02/18/2020         Assessment/Plan     Problem List Items Addressed This Visit        Cardiovascular and Mediastinum    Hyperlipidemia LDL goal <130 - Primary    Relevant Medications    simvastatin (ZOCOR) 40 MG tablet    Other Relevant Orders    Comprehensive Metabolic Panel       Respiratory    MILDRED (obstructive sleep apnea)    Relevant Orders    Ambulatory Referral to Sleep Medicine      Other Visit Diagnoses     Elevated hemoglobin A1c        Relevant Orders    Hemoglobin A1c    Abnormal thyroid blood test        Relevant Orders    TSH+Free T4    CBC (No Diff)    Morbid obesity (CMS/HCC)              · Blood pressure high initially, better on recheck. Info on mediterranean diet included on AVS. Resume statin, recheck lipids next visit 3-6 months.   · Patient's Body mass index is 40.35 kg/m². BMI is above normal parameters. Recommendations include: educational material.     Suma Zurita MD

## 2020-12-04 NOTE — PATIENT INSTRUCTIONS
Mediterranean Diet  A Mediterranean diet refers to food and lifestyle choices that are based on the traditions of countries located on the [place]. This way of eating has been shown to help prevent certain conditions and improve outcomes for people who have chronic diseases, like kidney disease and heart disease.  What are tips for following this plan?  Lifestyle  · Cook and eat meals together with your family, when possible.  · Drink enough fluid to keep your urine clear or pale yellow.  · Be physically active every day. This includes:  ? Aerobic exercise like running or swimming.  ? Leisure activities like gardening, walking, or housework.  · Get 7-8 hours of sleep each night.  · If recommended by your health care provider, drink red wine in moderation. This means 1 glass a day for nonpregnant women and 2 glasses a day for men. A glass of wine equals 5 oz (150 mL).  Reading food labels    · Check the serving size of packaged foods. For foods such as rice and pasta, the serving size refers to the amount of cooked product, not dry.  · Check the total fat in packaged foods. Avoid foods that have saturated fat or trans fats.  · Check the ingredients list for added sugars, such as corn syrup.  Shopping  · At the grocery store, buy most of your food from the areas near the walls of the store. This includes:  ? Fresh fruits and vegetables (produce).  ? Grains, beans, nuts, and seeds. Some of these may be available in unpackaged forms or large amounts (in bulk).  ? Fresh seafood.  ? Poultry and eggs.  ? Low-fat dairy products.  · Buy whole ingredients instead of prepackaged foods.  · Buy fresh fruits and vegetables in-season from local farmers markets.  · Buy frozen fruits and vegetables in resealable bags.  · If you do not have access to quality fresh seafood, buy precooked frozen shrimp or canned fish, such as tuna, salmon, or sardines.  · Buy small amounts of raw or cooked vegetables, salads, or olives from  the deli or salad bar at your store.  · Stock your pantry so you always have certain foods on hand, such as olive oil, canned tuna, canned tomatoes, rice, pasta, and beans.  Cooking  · Cook foods with extra-virgin olive oil instead of using butter or other vegetable oils.  · Have meat as a side dish, and have vegetables or grains as your main dish. This means having meat in small portions or adding small amounts of meat to foods like pasta or stew.  · Use beans or vegetables instead of meat in common dishes like chili or lasagna.  · [place] with different cooking methods. Try roasting or broiling vegetables instead of steaming or sautéeing them.  · Add frozen vegetables to soups, stews, pasta, or rice.  · Add nuts or seeds for added healthy fat at each meal. You can add these to yogurt, salads, or vegetable dishes.  · Marinate fish or vegetables using olive oil, lemon juice, garlic, and fresh herbs.  Meal planning    · Plan to eat 1 vegetarian meal one day each week. Try to work up to 2 vegetarian meals, if possible.  · Eat seafood 2 or more times a week.  · Have healthy snacks readily available, such as:  ? Vegetable sticks with hummus.  ? Greek yogurt.  ? Fruit and nut trail mix.  · Eat balanced meals throughout the week. This includes:  ? Fruit: 2-3 servings a day  ? Vegetables: 4-5 servings a day  ? Low-fat dairy: 2 servings a day  ? Fish, poultry, or lean meat: 1 serving a day  ? Beans and legumes: 2 or more servings a week  ? Nuts and seeds: 1-2 servings a day  ? Whole grains: 6-8 servings a day  ? Extra-virgin olive oil: 3-4 servings a day  · Limit red meat and sweets to only a few servings a month  What are my food choices?  · Mediterranean diet  ? Recommended  § Grains: Whole-grain pasta. Brown rice. Bulgar wheat. Polenta. Couscous. Whole-wheat bread. Oatmeal. Quinoa.  § Vegetables: Artichokes. Beets. Broccoli. Cabbage. Carrots. Eggplant. Green beans. Chard. Kale. Spinach. Onions. Leeks. Peas. Squash.  Tomatoes. Peppers. Radishes.  § Fruits: Apples. Apricots. Avocado. Berries. Bananas. Cherries. Dates. Figs. Grapes. [place]. Melon. Oranges. Peaches. Plums. Pomegranate.  § Meats and other protein foods: Beans. Almonds. Sunflower seeds. Pine nuts. Peanuts. Cod. [place]. Scallops. Shrimp. Tuna. Tilapia. Clams. Oysters. Eggs.  § Dairy: Low-fat milk. Cheese. Greek yogurt.  § Beverages: Water. Red wine. Herbal tea.  § Fats and oils: Extra virgin olive oil. Avocado oil. Grape seed oil.  § Sweets and desserts: Greek yogurt with honey. Baked apples. Poached pears. Trail mix.  § Seasoning and other foods: Basil. Cilantro. Coriander. Cumin. Mint. Parsley. Jaya. Rosemary. Tarragon. Garlic. Oregano. Thyme. Pepper. Balsalmic vinegar. Tahini. Hummus. Tomato sauce. Olives. Mushrooms.  ? Limit these  § Grains: Prepackaged pasta or rice dishes. Prepackaged cereal with added sugar.  § Vegetables: Deep fried potatoes (french fries).  § Fruits: Fruit canned in syrup.  § Meats and other protein foods: Beef. Pork. Lamb. Poultry with skin. Hot dogs. Dinh.  § Dairy: Ice cream. Sour cream. Whole milk.  § Beverages: Juice. Sugar-sweetened soft drinks. Beer. Liquor and spirits.  § Fats and oils: Butter. Canola oil. Vegetable oil. Beef fat (tallow). Lard.  § Sweets and desserts: Cookies. Cakes. Pies. Candy.  § Seasoning and other foods: Mayonnaise. Premade sauces and marinades.  The items listed may not be a complete list. Talk with your dietitian about what dietary choices are right for you.  Summary  · The Mediterranean diet includes both food and lifestyle choices.  · Eat a variety of fresh fruits and vegetables, beans, nuts, seeds, and whole grains.  · Limit the amount of red meat and sweets that you eat.  · Talk with your health care provider about whether it is safe for you to drink red wine in moderation. This means 1 glass a day for nonpregnant women and 2 glasses a day for men. A glass of wine equals 5 oz (150 mL).  This information  is not intended to replace advice given to you by your health care provider. Make sure you discuss any questions you have with your health care provider.  Document Revised: 08/17/2017 Document Reviewed: 08/10/2017  [employer]vier Patient Education © 2020 [employer]

## 2020-12-05 LAB
ALBUMIN SERPL-MCNC: 4.1 G/DL (ref 3.5–5.2)
ALBUMIN/GLOB SERPL: 1.5 G/DL
ALP SERPL-CCNC: 82 U/L (ref 39–117)
ALT SERPL-CCNC: 25 U/L (ref 1–41)
AST SERPL-CCNC: 21 U/L (ref 1–40)
BILIRUB SERPL-MCNC: 0.4 MG/DL (ref 0–1.2)
BUN SERPL-MCNC: 10 MG/DL (ref 6–20)
BUN/CREAT SERPL: 10.9 (ref 7–25)
CALCIUM SERPL-MCNC: 8.9 MG/DL (ref 8.6–10.5)
CHLORIDE SERPL-SCNC: 98 MMOL/L (ref 98–107)
CO2 SERPL-SCNC: 28.5 MMOL/L (ref 22–29)
CREAT SERPL-MCNC: 0.92 MG/DL (ref 0.76–1.27)
ERYTHROCYTE [DISTWIDTH] IN BLOOD BY AUTOMATED COUNT: 12.4 % (ref 12.3–15.4)
GLOBULIN SER CALC-MCNC: 2.8 GM/DL
GLUCOSE SERPL-MCNC: 131 MG/DL (ref 65–99)
HBA1C MFR BLD: 6.5 % (ref 4.8–5.6)
HCT VFR BLD AUTO: 45.7 % (ref 37.5–51)
HGB BLD-MCNC: 15.2 G/DL (ref 13–17.7)
MCH RBC QN AUTO: 31.3 PG (ref 26.6–33)
MCHC RBC AUTO-ENTMCNC: 33.3 G/DL (ref 31.5–35.7)
MCV RBC AUTO: 94 FL (ref 79–97)
PLATELET # BLD AUTO: 221 10*3/MM3 (ref 140–450)
POTASSIUM SERPL-SCNC: 4.5 MMOL/L (ref 3.5–5.2)
PROT SERPL-MCNC: 6.9 G/DL (ref 6–8.5)
RBC # BLD AUTO: 4.86 10*6/MM3 (ref 4.14–5.8)
SODIUM SERPL-SCNC: 136 MMOL/L (ref 136–145)
T4 FREE SERPL-MCNC: 0.63 NG/DL (ref 0.93–1.7)
TSH SERPL DL<=0.005 MIU/L-ACNC: 4.25 UIU/ML (ref 0.27–4.2)
WBC # BLD AUTO: 12 10*3/MM3 (ref 3.4–10.8)

## 2020-12-08 DIAGNOSIS — E03.9 ACQUIRED HYPOTHYROIDISM: Primary | ICD-10-CM

## 2020-12-08 PROBLEM — E11.59 TYPE 2 DIABETES MELLITUS WITH CIRCULATORY DISORDER, WITHOUT LONG-TERM CURRENT USE OF INSULIN (HCC): Status: ACTIVE | Noted: 2020-12-08

## 2020-12-08 RX ORDER — LEVOTHYROXINE SODIUM 0.07 MG/1
75 TABLET ORAL DAILY
Qty: 90 TABLET | Refills: 3 | Status: SHIPPED | OUTPATIENT
Start: 2020-12-08 | End: 2021-02-09

## 2021-02-09 ENCOUNTER — OFFICE VISIT (OUTPATIENT)
Dept: NEUROLOGY | Facility: CLINIC | Age: 56
End: 2021-02-09

## 2021-02-09 VITALS
BODY MASS INDEX: 40.95 KG/M2 | DIASTOLIC BLOOD PRESSURE: 70 MMHG | HEIGHT: 69 IN | SYSTOLIC BLOOD PRESSURE: 140 MMHG | WEIGHT: 276.5 LBS | TEMPERATURE: 97.8 F

## 2021-02-09 DIAGNOSIS — R40.0 DAYTIME SLEEPINESS: ICD-10-CM

## 2021-02-09 DIAGNOSIS — E03.8 OTHER SPECIFIED HYPOTHYROIDISM: ICD-10-CM

## 2021-02-09 DIAGNOSIS — E78.5 DYSLIPIDEMIA: ICD-10-CM

## 2021-02-09 DIAGNOSIS — R06.83 SNORING: ICD-10-CM

## 2021-02-09 DIAGNOSIS — G47.33 OSA (OBSTRUCTIVE SLEEP APNEA): Primary | ICD-10-CM

## 2021-02-09 PROCEDURE — 99203 OFFICE O/P NEW LOW 30 MIN: CPT | Performed by: NURSE PRACTITIONER

## 2021-02-09 NOTE — PROGRESS NOTES
New Sleep Patient Office Visit      Patient Name: Dom Marin  : 1965   MRN: [id number]     Referring Physician: Smua Zurita MD    Chief Complaint:    Chief Complaint   Patient presents with   • Consult     NP, IN OFFICE TO ESTABLISH CARE FOR MILDRED. PATIENT SNORING, RESTLESS SLEEP.       History of Present Illness: Dom Marin is a 55 y.o. male who is here today to establish care with Sleep Medicine.  He was initially diagnosed with sleep apnea in  and again in  ([facility], [place]).  He was on CPAP therapy until last year, when his machine broke.  Sleep questionnaire reviewed.  It takes him 10 minutes to fall asleep, he sleeps 6-7 hours per night, he denies experiencing restless or disturbed sleep, he is not usually rested upon awakening, he works rotating shifts, he snores, he has been told he stops breathing during sleep, he wakes up with a dry mouth, he has daytime sleepiness.  Additional risk factors- BMI 40, dyslipidemia, hypothyroidism.     [place] Score: 16    Subjective      Review of Systems:   Review of Systems   Constitutional: Positive for fatigue. Negative for fever, unexpected weight gain and unexpected weight loss.   HENT: Negative for hearing loss, sore throat, swollen glands, tinnitus and trouble swallowing.    Eyes: Negative for blurred vision, double vision, photophobia and visual disturbance.   Respiratory: Negative for cough, chest tightness and shortness of breath.    Cardiovascular: Negative for chest pain, palpitations and leg swelling.   Gastrointestinal: Negative for constipation, diarrhea and nausea.   Endocrine: Negative for cold intolerance and heat intolerance.   Musculoskeletal: Negative for gait problem, neck pain and neck stiffness.   Skin: Negative for color change and rash.   Allergic/Immunologic: Negative for environmental allergies and food allergies.   Neurological: Negative for dizziness, syncope, facial asymmetry, speech difficulty,  weakness, headache, memory problem and confusion.   Psychiatric/Behavioral: Negative for agitation, behavioral problems and depressed mood. The patient is not nervous/anxious.        Past Medical History:   Past Medical History:   Diagnosis Date   • Colon polyp    • Diabetes mellitus (CMS/HCC)    • Fracture    • Hyperlipidemia    • Hypertension    • Obesity        Past Surgical History:   Past Surgical History:   Procedure Laterality Date   • COLONOSCOPY     • TONSILLECTOMY         Family History:   Family History   Problem Relation Age of Onset   • Diabetes Mother    • Hyperlipidemia Mother    • Cancer Father    • Hypertension Father        Social History:   Social History     Socioeconomic History   • Marital status:      Spouse name: Not on file   • Number of children: Not on file   • Years of education: Not on file   • Highest education level: Not on file   Tobacco Use   • Smoking status: Former Smoker     Packs/day: 1.00     Years: 20.00     Pack years: 20.00     Types: Cigarettes     Quit date: 2008     Years since quittin.9   • Smokeless tobacco: Former User     Quit date: 2008   Substance and Sexual Activity   • Alcohol use: Yes     Comment: 5 glasses per week   • Drug use: No       Medications:     Current Outpatient Medications:   •  Ascorbic Acid (VITAMIN C) 500 MG capsule, Take  by mouth., Disp: , Rfl:   •  B Complex Vitamins (VITAMIN B COMPLEX PO), Take  by mouth., Disp: , Rfl:   •  Cholecalciferol (D3 ADULT PO), Take  by mouth., Disp: , Rfl:   •  Pyridoxine HCl (B-6 PO), Take  by mouth., Disp: , Rfl:   •  simvastatin (ZOCOR) 40 MG tablet, Take 1 tablet by mouth Every Night. Indications: High Amount of Fats in the Blood, Disp: 90 tablet, Rfl: 3    Allergies:   No Known Allergies    Objective     Physical Exam:  Vital Signs:   Vitals:    21 1355   BP: 140/70   BP Location: Right arm   Patient Position: Sitting   Cuff Size: Adult   Temp: 97.8 °F (36.6 °C)   Weight: 125 kg (276  "lb 8 oz)   Height: 175.3 cm (69\")   PainSc: 0-No pain     BMI: Body mass index is 40.83 kg/m².  Neck Circumference: 17 1/2    Physical Exam  Vitals signs and nursing note reviewed.   Constitutional:       General: He is not in acute distress.     Appearance: He is well-developed. He is obese. He is not diaphoretic.   HENT:      Head: Normocephalic and atraumatic.      Comments: Mallampati 4  Eyes:      Conjunctiva/sclera: Conjunctivae normal.      Pupils: Pupils are equal, round, and reactive to light.   Neck:      Musculoskeletal: Neck supple.      Thyroid: No thyroid mass or thyromegaly.      Vascular: Normal carotid pulses.      Trachea: Trachea normal.   Cardiovascular:      Rate and Rhythm: Normal rate and regular rhythm.      Heart sounds: Normal heart sounds. No murmur. No friction rub. No gallop.    Pulmonary:      Effort: Pulmonary effort is normal. No respiratory distress.      Breath sounds: Normal breath sounds. No wheezing or rales.   Musculoskeletal: Normal range of motion.   Skin:     General: Skin is warm and dry.      Findings: No rash.   Neurological:      Mental Status: He is alert and oriented to person, place, and time.   Psychiatric:         Behavior: Behavior normal.         Thought Content: Thought content normal.         Assessment / Plan      Assessment/Plan:   Diagnoses and all orders for this visit:    1. MILDRED (obstructive sleep apnea) (Primary)  - Order for AutoPap 6/16 sent to "[employer].   - Sleep study from 2015 [facility] requested for review. Will not order a sleep study unless required by insurance.   - Advised patient to avoid driving if drowsy.   - Encouraged weight loss with a BMI goal of 24.     2. Snoring    3. Daytime sleepiness    4. Dyslipidemia    5. Other specified hypothyroidism    6. BMI 40.0-44.9, adult (CMS/[facility])         Follow Up:   Return in about 3 months (around 5/9/2021) for F/U Obstructive Sleep Apnea.    I have advised the patient the need to continue the use of " CPAP.  Gold standard for treatment of sleep apnea includes weight loss, use of cpap and avoidance of alcohol.  Untreated MILDRED may increase the risk for development of hypertension, stroke, myocardial infarction, diabetes, cardiovascular disease, work-related issues and driving accidents. I have counseled and advised the patient to avoid driving or operating heavy/dangerous equipment if feeling drowsy.     IMELDA Webster, FNP-C  [facility] Neurology and Sleep Medicine       Please note that portions of this note may have been completed with a voice recognition program. Efforts were made to edit the dictations, but occasionally words are mistranscribed.

## 2021-05-18 ENCOUNTER — TELEPHONE (OUTPATIENT)
Dept: NEUROLOGY | Facility: CLINIC | Age: 56
End: 2021-05-18

## 2021-05-18 ENCOUNTER — OFFICE VISIT (OUTPATIENT)
Dept: NEUROLOGY | Facility: CLINIC | Age: 56
End: 2021-05-18

## 2021-05-18 VITALS
TEMPERATURE: 97.3 F | HEART RATE: 76 BPM | WEIGHT: 272.8 LBS | SYSTOLIC BLOOD PRESSURE: 126 MMHG | OXYGEN SATURATION: 98 % | BODY MASS INDEX: 40.4 KG/M2 | HEIGHT: 69 IN | DIASTOLIC BLOOD PRESSURE: 78 MMHG

## 2021-05-18 DIAGNOSIS — G47.33 OSA (OBSTRUCTIVE SLEEP APNEA): Primary | ICD-10-CM

## 2021-05-18 PROCEDURE — 99212 OFFICE O/P EST SF 10 MIN: CPT | Performed by: NURSE PRACTITIONER

## 2021-05-18 NOTE — PROGRESS NOTES
New Sleep Patient Office Visit      Patient Name: Dom Marin  : 1965   MRN: [id number]     Referring Physician: No ref. provider found    Chief Complaint:    Chief Complaint   Patient presents with   • Sleep Apnea     Patient in office today for MILDRED follow up.        History of Present Illness: Dom Marin is a 55 y.o. male who is here today to follow up for MILDRED.  He received a new AutoPap machine since his last visit and he is tolerating his pressures well.  Currently on AutoPap 6/16cm, compliance >4 hours 56.7%, overall compliance 63.3%, AHI 4/hour.  He says his compliance has been down because he was out of town one day and he does not sleep much.  Additional risk factors- BMI 39, dyslipidemia.      Subjective      Review of Systems:   Review of Systems   Constitutional: Positive for fatigue. Negative for chills and fever.   HENT: Negative for facial swelling, hearing loss, sore throat, tinnitus and trouble swallowing.    Eyes: Negative for blurred vision, double vision, photophobia and visual disturbance.   Respiratory: Negative for cough, chest tightness and shortness of breath.    Cardiovascular: Negative for chest pain, palpitations and leg swelling.   Gastrointestinal: Negative for abdominal pain, nausea and vomiting.   Endocrine: Negative for cold intolerance and heat intolerance.   Musculoskeletal: Negative for gait problem, neck pain and neck stiffness.   Skin: Negative for color change and rash.   Allergic/Immunologic: Negative for environmental allergies and food allergies.   Neurological: Negative for dizziness, syncope, speech difficulty, weakness, light-headedness, numbness, headache and memory problem.   Psychiatric/Behavioral: Negative for behavioral problems, sleep disturbance and depressed mood. The patient is not nervous/anxious.        Past Medical History:   Past Medical History:   Diagnosis Date   • Colon polyp    • Diabetes mellitus (CMS/HCC)    • Fracture    •  "Hyperlipidemia    • Hypertension    • Obesity    • Sleep apnea        Past Surgical History:   Past Surgical History:   Procedure Laterality Date   • COLONOSCOPY     • TONSILLECTOMY         Family History:   Family History   Problem Relation Age of Onset   • Diabetes Mother    • Hyperlipidemia Mother    • Cancer Father    • Hypertension Father        Social History:   Social History     Socioeconomic History   • Marital status:      Spouse name: Not on file   • Number of children: Not on file   • Years of education: Not on file   • Highest education level: Not on file   Tobacco Use   • Smoking status: Former Smoker     Packs/day: 1.00     Years: 20.00     Pack years: 20.00     Types: Cigarettes     Quit date: 2008     Years since quittin.2   • Smokeless tobacco: Former User     Quit date: 2008   Substance and Sexual Activity   • Alcohol use: Yes     Comment: 5 glasses per week   • Drug use: No   • Sexual activity: Defer       Medications:     Current Outpatient Medications:   •  Ascorbic Acid (VITAMIN C) 500 MG capsule, Take  by mouth., Disp: , Rfl:   •  B Complex Vitamins (VITAMIN B COMPLEX PO), Take  by mouth., Disp: , Rfl:   •  Cholecalciferol (D3 ADULT PO), Take  by mouth., Disp: , Rfl:   •  simvastatin (ZOCOR) 40 MG tablet, Take 1 tablet by mouth Every Night. Indications: High Amount of Fats in the Blood, Disp: 90 tablet, Rfl: 3    Allergies:   No Known Allergies    Objective     Physical Exam:  Vital Signs:   Vitals:    21 0946   BP: 126/78   Pulse: 76   Temp: 97.3 °F (36.3 °C)   SpO2: 98%   Weight: 124 kg (272 lb 12.8 oz)   Height: 176.5 cm (69.49\")   PainSc: 0-No pain     BMI: Body mass index is 39.72 kg/m².    Physical Exam  Vitals and nursing note reviewed.   Constitutional:       General: He is not in acute distress.     Appearance: He is well-developed. He is obese. He is not diaphoretic.   HENT:      Head: Normocephalic and atraumatic.   Eyes:      Conjunctiva/sclera: " Conjunctivae normal.      Pupils: Pupils are equal, round, and reactive to light.   Neck:      Thyroid: No thyroid mass or thyromegaly.      Vascular: Normal carotid pulses.      Trachea: Trachea normal.   Pulmonary:      Effort: Pulmonary effort is normal. No respiratory distress.   Musculoskeletal:         General: Normal range of motion.   Skin:     General: Skin is warm and dry.      Findings: No rash.   Neurological:      Mental Status: He is alert and oriented to person, place, and time.   Psychiatric:         Mood and Affect: Mood normal.         Behavior: Behavior normal.         Thought Content: Thought content normal.         Judgment: Judgment normal.         Assessment / Plan      Assessment/Plan:   Diagnoses and all orders for this visit:    1. MILDRED (obstructive sleep apnea) (Primary)  - [facility] Advised patient to wear his AutoPap for at least 4 hours every night-increased compliance is the goal.   - Advised patient to avoid driving if drowsy.     2. BMI 39.0-39.9,adult       Follow Up:   Return in about 1 year (around 5/18/2022) for F/U Obstructive Sleep Apnea.    I have advised the patient the need to continue the use of CPAP.  Gold standard for treatment of sleep apnea includes weight loss, use of cpap and avoidance of alcohol.  Untreated MILDRED may increase the risk for development of hypertension, stroke, myocardial infarction, diabetes, cardiovascular disease, work-related issues and driving accidents. I have counseled and advised the patient to avoid driving or operating heavy/dangerous equipment if feeling drowsy.     IMELDA Webster, FNP-C  [facility] Neurology and Sleep Medicine       Please note that portions of this note may have been completed with a voice recognition program. Efforts were made to edit the dictations, but occasionally words are mistranscribed.

## 2021-06-08 ENCOUNTER — OFFICE VISIT (OUTPATIENT)
Dept: INTERNAL MEDICINE | Facility: CLINIC | Age: 56
End: 2021-06-08

## 2021-06-08 VITALS
RESPIRATION RATE: 18 BRPM | SYSTOLIC BLOOD PRESSURE: 142 MMHG | TEMPERATURE: 98.2 F | DIASTOLIC BLOOD PRESSURE: 89 MMHG | BODY MASS INDEX: 39.58 KG/M2 | OXYGEN SATURATION: 94 % | HEART RATE: 79 BPM | HEIGHT: 69 IN | WEIGHT: 267.25 LBS

## 2021-06-08 DIAGNOSIS — Z12.5 PROSTATE CANCER SCREENING: ICD-10-CM

## 2021-06-08 DIAGNOSIS — R79.89 ABNORMAL THYROID BLOOD TEST: ICD-10-CM

## 2021-06-08 DIAGNOSIS — Z12.2 ENCOUNTER FOR SCREENING FOR LUNG CANCER: ICD-10-CM

## 2021-06-08 DIAGNOSIS — G47.33 OSA (OBSTRUCTIVE SLEEP APNEA): ICD-10-CM

## 2021-06-08 DIAGNOSIS — E11.65 TYPE 2 DIABETES MELLITUS WITH HYPERGLYCEMIA, WITHOUT LONG-TERM CURRENT USE OF INSULIN (HCC): ICD-10-CM

## 2021-06-08 DIAGNOSIS — Z00.00 ANNUAL PHYSICAL EXAM: Primary | ICD-10-CM

## 2021-06-08 DIAGNOSIS — E78.5 HYPERLIPIDEMIA LDL GOAL <130: ICD-10-CM

## 2021-06-08 DIAGNOSIS — E66.01 CLASS 2 SEVERE OBESITY DUE TO EXCESS CALORIES WITH SERIOUS COMORBIDITY AND BODY MASS INDEX (BMI) OF 38.0 TO 38.9 IN ADULT (HCC): ICD-10-CM

## 2021-06-08 DIAGNOSIS — R23.4 CRACKED SKIN ON FEET: ICD-10-CM

## 2021-06-08 DIAGNOSIS — Z23 NEED FOR PNEUMOCOCCAL VACCINATION: ICD-10-CM

## 2021-06-08 DIAGNOSIS — R79.89 ABNORMAL CBC: ICD-10-CM

## 2021-06-08 DIAGNOSIS — Z87.891 PERSONAL HISTORY OF TOBACCO USE, PRESENTING HAZARDS TO HEALTH: ICD-10-CM

## 2021-06-08 PROBLEM — Z86.19 HISTORY OF SHINGLES: Status: ACTIVE | Noted: 2021-06-08

## 2021-06-08 PROBLEM — E66.812 CLASS 2 SEVERE OBESITY WITH SERIOUS COMORBIDITY AND BODY MASS INDEX (BMI) OF 39.0 TO 39.9 IN ADULT: Status: RESOLVED | Noted: 2019-02-25 | Resolved: 2021-06-08

## 2021-06-08 PROBLEM — I10 ESSENTIAL HYPERTENSION: Status: ACTIVE | Noted: 2021-06-08

## 2021-06-08 PROBLEM — Z86.19 HISTORY OF SHINGLES: Chronic | Status: ACTIVE | Noted: 2021-06-08

## 2021-06-08 LAB
ALBUMIN SERPL-MCNC: 4.6 G/DL (ref 3.5–5.2)
ALBUMIN/GLOB SERPL: 1.6 G/DL
ALP SERPL-CCNC: 85 U/L (ref 39–117)
ALT SERPL-CCNC: 23 U/L (ref 1–41)
AST SERPL-CCNC: 19 U/L (ref 1–40)
BILIRUB SERPL-MCNC: 0.5 MG/DL (ref 0–1.2)
BUN SERPL-MCNC: 9 MG/DL (ref 6–20)
BUN/CREAT SERPL: 9.9 (ref 7–25)
CALCIUM SERPL-MCNC: 9.4 MG/DL (ref 8.6–10.5)
CHLORIDE SERPL-SCNC: 102 MMOL/L (ref 98–107)
CHOLEST SERPL-MCNC: 187 MG/DL (ref 0–200)
CO2 SERPL-SCNC: 24.1 MMOL/L (ref 22–29)
CREAT SERPL-MCNC: 0.91 MG/DL (ref 0.76–1.27)
ERYTHROCYTE [DISTWIDTH] IN BLOOD BY AUTOMATED COUNT: 12.5 % (ref 12.3–15.4)
GLOBULIN SER CALC-MCNC: 2.9 GM/DL
GLUCOSE SERPL-MCNC: 113 MG/DL (ref 65–99)
HBA1C MFR BLD: 6.5 % (ref 4.8–5.6)
HCT VFR BLD AUTO: 48.4 % (ref 37.5–51)
HDLC SERPL-MCNC: 44 MG/DL (ref 40–60)
HGB BLD-MCNC: 16.3 G/DL (ref 13–17.7)
LDLC SERPL CALC-MCNC: 113 MG/DL (ref 0–100)
MCH RBC QN AUTO: 31 PG (ref 26.6–33)
MCHC RBC AUTO-ENTMCNC: 33.7 G/DL (ref 31.5–35.7)
MCV RBC AUTO: 92.2 FL (ref 79–97)
PLATELET # BLD AUTO: 263 10*3/MM3 (ref 140–450)
POC CREATININE URINE: 300
POC MICROALBUMIN URINE: 30
POTASSIUM SERPL-SCNC: 4.6 MMOL/L (ref 3.5–5.2)
PROT SERPL-MCNC: 7.5 G/DL (ref 6–8.5)
PSA SERPL-MCNC: 0.19 NG/ML (ref 0–4)
RBC # BLD AUTO: 5.25 10*6/MM3 (ref 4.14–5.8)
SODIUM SERPL-SCNC: 137 MMOL/L (ref 136–145)
T4 FREE SERPL-MCNC: 0.69 NG/DL (ref 0.93–1.7)
TRIGL SERPL-MCNC: 168 MG/DL (ref 0–150)
TSH SERPL DL<=0.005 MIU/L-ACNC: 2.26 UIU/ML (ref 0.27–4.2)
VLDLC SERPL CALC-MCNC: 30 MG/DL (ref 5–40)
WBC # BLD AUTO: 9.78 10*3/MM3 (ref 3.4–10.8)

## 2021-06-08 PROCEDURE — 90471 IMMUNIZATION ADMIN: CPT | Performed by: FAMILY MEDICINE

## 2021-06-08 PROCEDURE — 82044 UR ALBUMIN SEMIQUANTITATIVE: CPT | Performed by: FAMILY MEDICINE

## 2021-06-08 PROCEDURE — 99396 PREV VISIT EST AGE 40-64: CPT | Performed by: FAMILY MEDICINE

## 2021-06-08 PROCEDURE — 90732 PPSV23 VACC 2 YRS+ SUBQ/IM: CPT | Performed by: FAMILY MEDICINE

## 2021-06-08 RX ORDER — MOMETASONE FUROATE 1 MG/G
CREAM TOPICAL DAILY
Qty: 150 G | Refills: 3 | Status: SHIPPED | OUTPATIENT
Start: 2021-06-08 | End: 2022-05-31

## 2021-06-08 RX ORDER — SIMVASTATIN 40 MG
40 TABLET ORAL NIGHTLY
Qty: 90 TABLET | Refills: 3 | Status: SHIPPED | OUTPATIENT
Start: 2021-06-08 | End: 2022-04-10 | Stop reason: SDUPTHER

## 2021-06-08 NOTE — PROGRESS NOTES
"06/08/2021  Chief Complaint   Patient presents with   • Annual Exam     Physical       Dom Marin is here for his annual preventive exam. History per MA reviewed.     Last visit 12/4/20, labs abnormal:  Lab Results   Component Value Date    HGBA1C 6.50 (H) 12/04/2020     Lab Results   Component Value Date    TSH 4.250 (H) 12/04/2020     Lab Results   Component Value Date    FREET4 0.63 (L) 12/04/2020     Lab Results   Component Value Date    WBC 12.00 (H) 12/04/2020    HGB 15.2 12/04/2020    HCT 45.7 12/04/2020    MCV 94.0 12/04/2020     12/04/2020     Last lipid profile >1 year ago. On simvastatin.  Lab Results   Component Value Date    CHLPL 122 02/18/2020    TRIG 78 02/18/2020    HDL 41 02/18/2020    LDL 65 02/18/2020     Wearing CPAP \"half of the time\". Works nights and then sleeps outside of bedroom where CPAP is.     Has lost weight using super beet supplement. Wants to lose more.      Dom Marin has the following medical issues:  Patient Active Problem List    Diagnosis    • Essential hypertension [I10]    • History of shingles [Z86.19]    • Type 2 diabetes mellitus with circulatory disorder, without long-term current use of insulin (CMS/[facility]) [E11.59]    • MILDRED (obstructive sleep apnea) [G47.33]    • Hyperlipidemia LDL goal <130 [E78.5]        Health Maintenance   Topic Date Due   • URINE MICROALBUMIN  Never done   • LUNG CANCER SCREENING  Never done   • DIABETIC FOOT EXAM  Never done   • LIPID PANEL  02/18/2021   • HEMOGLOBIN A1C  06/04/2021   • DIABETIC EYE EXAM  06/01/2022 (Originally 1/24/2018)   • ZOSTER VACCINE (1 of 2) 06/17/2022 (Originally 11/26/2014)   • INFLUENZA VACCINE  08/01/2021   • ANNUAL PHYSICAL  06/09/2022   • COLORECTAL CANCER SCREENING  01/01/2025   • TDAP/TD VACCINES (2 - Td or Tdap) 02/18/2030   • HEPATITIS C SCREENING  Completed   • COVID-19 Vaccine  Completed   • Pneumococcal Vaccine 0-64  Completed   • Hepatitis B  Discontinued       Immunization History   Administered " "Date(s) Administered   • COVID-19 (MODERNA) 01/28/2021, 03/09/2021   • Pneumococcal Polysaccharide (PPSV23) 06/08/2021   • Tdap 02/18/2020   • flucelvax quad pfs =>4 YRS 02/18/2020       Review of Systems   Constitutional: Negative for fever.   Gastrointestinal: Negative for abdominal pain.   Skin: Positive for dry skin.   All other systems reviewed and are negative.      The following portions of the patient's history were reviewed and updated as appropriate: allergies, current medications, past family history, past medical history, past social history, past surgical history and problem list.    Objective   Visit Vitals  /89   Pulse 79   Temp 98.2 °F (36.8 °C) (Temporal)   Resp 18   Ht 176.5 cm (69.49\")   Wt 121 kg (267 lb 4 oz)   SpO2 94%   BMI 38.91 kg/m²        Physical Exam  Vitals and nursing note reviewed.   Constitutional:       General: He is not in acute distress.     Appearance: Normal appearance. He is well-developed and well-groomed. He is obese. He is not ill-appearing, toxic-appearing or diaphoretic.      Interventions: Face mask in place.   HENT:      Head: Normocephalic and atraumatic.      Right Ear: Hearing, ear canal and external ear normal. Tympanic membrane is scarred.      Left Ear: Hearing, tympanic membrane, ear canal and external ear normal.   Eyes:      General: Lids are normal. Gaze aligned appropriately. No scleral icterus.        Right eye: No discharge.         Left eye: No discharge.      Extraocular Movements: Extraocular movements intact.      Conjunctiva/sclera: Conjunctivae normal.      Pupils: Pupils are equal, round, and reactive to light.   Neck:      Thyroid: No thyromegaly.      Trachea: Phonation normal.   Cardiovascular:      Rate and Rhythm: Normal rate and regular rhythm.      Pulses:           Posterior tibial pulses are 2+ on the right side and 2+ on the left side.      Heart sounds: Normal heart sounds.   Pulmonary:      Effort: Pulmonary effort is normal.      " Breath sounds: Normal breath sounds and air entry.   Abdominal:      General: Bowel sounds are normal. There is no distension.      Palpations: Abdomen is soft. Abdomen is not rigid. There is no mass.      Tenderness: There is no abdominal tenderness. There is no guarding or rebound.   Musculoskeletal:         General: No deformity or signs of injury.      Cervical back: Neck supple.      Right foot: No deformity, Charcot foot or foot drop.      Left foot: No deformity, Charcot foot or foot drop.   Feet:      Right foot:      Protective Sensation: 1 site tested. 1 site sensed.     Skin integrity: Callus, dry skin and fissure present.      Left foot:      Protective Sensation: 1 site tested. 1 site sensed.     Skin integrity: Callus, dry skin and fissure present.      Comments: Diabetic Foot Exam Performed and Monofilament Test Performed    Skin:     General: Skin is warm and dry.      Capillary Refill: Capillary refill takes less than 2 seconds.      Coloration: Skin is not cyanotic, jaundiced or pale.   Neurological:      General: No focal deficit present.      Mental Status: He is alert and oriented to person, place, and time. Mental status is at baseline.      Cranial Nerves: No dysarthria.      Motor: No tremor, abnormal muscle tone or seizure activity.      Gait: Gait is intact.   Psychiatric:         Attention and Perception: Attention and perception normal.         Mood and Affect: Mood and affect normal.         Speech: Speech normal.         Behavior: Behavior normal. Behavior is cooperative.         Thought Content: Thought content normal.         Cognition and Memory: Cognition and memory normal.         Judgment: Judgment normal.         Lab Results   Component Value Date    CHLPL 122 02/18/2020    TRIG 78 02/18/2020    HDL 41 02/18/2020    LDL 65 02/18/2020     Lab Results   Component Value Date    TSH 4.250 (H) 12/04/2020     Lab Results   Component Value Date    FREET4 0.63 (L) 12/04/2020     Lab  Results   Component Value Date    HGBA1C 6.50 (H) 12/04/2020    HGBA1C 6.90 (H) 02/18/2020    HGBA1C 6.90 02/25/2019       Assessment     Problem List Items Addressed This Visit        Cardiac and Vasculature    Hyperlipidemia LDL goal <130    Relevant Medications    simvastatin (ZOCOR) 40 MG tablet    Other Relevant Orders    Lipid Panel (Completed)    Comprehensive Metabolic Panel (Completed)       Sleep    MILDRED (obstructive sleep apnea)    Current Assessment & Plan     Encouraged compliance with CPAP. Would help with blood pressure control.           Other Visit Diagnoses     Annual physical exam    -  Primary    Class 2 severe obesity due to excess calories with serious comorbidity and body mass index (BMI) of 38.0 to 38.9 in adult (CMS/[facility])        BMI last visit was 40.35, weight improved.    Relevant Orders    TSH+Free T4 (Completed)    Comprehensive Metabolic Panel (Completed)    Need for pneumococcal vaccination        Relevant Orders    Pneumococcal Polysaccharide Vaccine 23-Valent Greater Than or Equal To 3yo Subcutaneous / IM (Completed)    Personal history of tobacco use, presenting hazards to health        Relevant Orders     CT Chest Low Dose Cancer Screening WO    Encounter for screening for lung cancer        Relevant Orders     CT Chest Low Dose Cancer Screening WO    Prostate cancer screening        Relevant Orders    PSA Screen (Completed)    Type 2 diabetes mellitus with hyperglycemia, without long-term current use of insulin (CMS/[facility])        Relevant Orders    Comprehensive Metabolic Panel (Completed)    Hemoglobin A1c (Completed)    POC Microalbumin (Completed)    Abnormal thyroid blood test        Relevant Orders    TSH+Free T4 (Completed)    Abnormal CBC        Relevant Orders    CBC (No Diff) (Completed)    Cracked skin on feet        Relevant Medications    fluocinonide-emollient (LIDEX-E) 0.05 % cream    mometasone (Elocon) 0.1 % cream          · Health maintenance information provided with  patient plan.   · Counseled on age appropriate health screenings.  · Immunizations for age discussed, encouraged. Shingrix @ pharmacy.  · Encourage healthy habits such as exercise, healthy diet.  Patient's Body mass index is 38.91 kg/m². indicating that he is obese (BMI >30). Obesity-related health conditions include the following: hypertension, diabetes mellitus, dyslipidemias and osteoarthritis. Obesity is improving with lifestyle modifications. BMI is is above average; BMI management plan is completed. We discussed portion control, increasing exercise and info per AVS..  · Return in about 3 months (around 9/11/2021) for Blood Pressure follow up, Diabetes follow up.     Suma Zurita MD

## 2021-06-08 NOTE — PATIENT INSTRUCTIONS
Health Maintenance, Male  A healthy lifestyle and preventive care is important for your health and wellness. Ask your health care provider about what schedule of regular examinations is right for you.  What should I know about weight and diet?    Eat a Healthy Diet  · Eat plenty of vegetables, fruits, whole grains, low-fat dairy products, and lean protein.  · Do not eat a lot of foods high in solid fats, added sugars, or salt.     Maintain a Healthy Weight  Regular exercise can help you achieve or maintain a healthy weight. You should:  · Do at least 150 minutes of exercise each week. The exercise should increase your heart rate and make you sweat (moderate-intensity exercise).  · Do strength-training exercises at least twice a week.     Watch Your Levels of Cholesterol and Blood Lipids  · Have your blood tested for lipids and cholesterol every 5 years starting at 35 years of age. If you are at high risk for heart disease, you should start having your blood tested when you are 20 years old. You may need to have your cholesterol levels checked more often if:  ? Your lipid or cholesterol levels are high.  ? You are older than 50 years of age.  ? You are at high risk for heart disease.     What should I know about cancer screening?  Many types of cancers can be detected early and may often be prevented.  Lung Cancer  · You should be screened every year for lung cancer if:  ? You are a current smoker who has smoked for at least 30 years.  ? You are a former smoker who has quit within the past 15 years.  · Talk to your health care provider about your screening options, when you should start screening, and how often you should be screened.     Colorectal Cancer  · Routine colorectal cancer screening usually begins at 50 years of age and should be repeated every 5-10 years until you are 75 years old. You may need to be screened more often if early forms of precancerous polyps or small growths are found. Your health care  provider may recommend screening at an earlier age if you have risk factors for colon cancer.  · Your health care provider may recommend using home test kits to check for hidden blood in the stool.  · A small camera at the end of a tube can be used to examine your colon (sigmoidoscopy or colonoscopy). This checks for the earliest forms of colorectal cancer.     Prostate and Testicular Cancer  · Depending on your age and overall health, your health care provider may do certain tests to screen for prostate and testicular cancer.  · Talk to your health care provider about any symptoms or concerns you have about testicular or prostate cancer.     Skin Cancer  · Check your skin from head to toe regularly.  · Tell your health care provider about any new moles or changes in moles, especially if:  ? There is a change in a mole’s size, shape, or color.  ? You have a mole that is larger than a pencil eraser.  · Always use sunscreen. Apply sunscreen liberally and repeat throughout the day.  · Protect yourself by wearing long sleeves, pants, a wide-brimmed hat, and sunglasses when outside.     What should I know about heart disease, diabetes, and high blood pressure?  · If you are 18-39 years of age, have your blood pressure checked every 3-5 years. If you are 40 years of age or older, have your blood pressure checked every year. You should have your blood pressure measured twice--once when you are at a hospital or clinic, and once when you are not at a hospital or clinic. Record the average of the two measurements. To check your blood pressure when you are not at a hospital or clinic, you can use:  ? An automated blood pressure machine at a pharmacy.  ? A home blood pressure monitor.  · Talk to your health care provider about your target blood pressure.  · If you are between 45-79 years old, ask your health care provider if you should take aspirin to prevent heart disease.  · Have regular diabetes screenings by checking your  fasting blood sugar level.  ? If you are at a normal weight and have a low risk for diabetes, have this test once every three years after the age of 45.  ? If you are overweight and have a high risk for diabetes, consider being tested at a younger age or more often.  · A one-time screening for abdominal aortic aneurysm (AAA) by ultrasound is recommended for men aged 65-75 years who are current or former smokers.  What should I know about preventing infection?  Hepatitis B  If you have a higher risk for hepatitis B, you should be screened for this virus. Talk with your health care provider to find out if you are at risk for hepatitis B infection.  Hepatitis C  Blood testing is recommended for:  · Everyone born from 1945 through 1965.  · Anyone with known risk factors for hepatitis C.     Sexually Transmitted Diseases (STDs)  · You should be screened each year for STDs including gonorrhea and chlamydia if:  ? You are sexually active and are younger than 24 years of age.  ? You are older than 24 years of age and your health care provider tells you that you are at risk for this type of infection.  ? Your sexual activity has changed since you were last screened and you are at an increased risk for chlamydia or gonorrhea. Ask your health care provider if you are at risk.  · Talk with your health care provider about whether you are at high risk of being infected with HIV. Your health care provider may recommend a prescription medicine to help prevent HIV infection.     What else can I do?  ·   · Schedule regular health, dental, and eye exams.  · Stay current with your vaccines (immunizations).  · Do not use any tobacco products, such as cigarettes, chewing tobacco, and e-cigarettes. If you need help quitting, ask your health care provider.  · Limit alcohol intake to no more than 2 drinks per day. One drink equals 12 ounces of beer, 5 ounces of wine, or 1½ ounces of hard liquor.  · Do not use street drugs.  · Do not share  needles.  · Ask your health care provider for help if you need support or information about quitting drugs.  · Tell your health care provider if you often feel depressed.  · Tell your health care provider if you have ever been abused or do not feel safe at home.      This information is not intended to replace advice given to you by your health care provider. Make sure you discuss any questions you have with your health care provider.  Document Released: 06/15/2009 Document Revised: 08/16/2017 Document Reviewed: 09/20/2016  Elsevier Interactive Patient Education © 2018 [employer]         Mediterranean Diet  A Mediterranean diet refers to food and lifestyle choices that are based on the traditions of countries located on the [place]. This way of eating has been shown to help prevent certain conditions and improve outcomes for people who have chronic diseases, like kidney disease and heart disease.  What are tips for following this plan?  Lifestyle  · Cook and eat meals together with your family, when possible.  · Drink enough fluid to keep your urine clear or pale yellow.  · Be physically active every day. This includes:  ? Aerobic exercise like running or swimming.  ? Leisure activities like gardening, walking, or housework.  · Get 7-8 hours of sleep each night.  · If recommended by your health care provider, drink red wine in moderation. This means 1 glass a day for nonpregnant women and 2 glasses a day for men. A glass of wine equals 5 oz (150 mL).  Reading food labels    · Check the serving size of packaged foods. For foods such as rice and pasta, the serving size refers to the amount of cooked product, not dry.  · Check the total fat in packaged foods. Avoid foods that have saturated fat or trans fats.  · Check the ingredients list for added sugars, such as corn syrup.  Shopping  · At the grocery store, buy most of your food from the areas near the walls of the store. This includes:  ? Fresh fruits  and vegetables (produce).  ? Grains, beans, nuts, and seeds. Some of these may be available in unpackaged forms or large amounts (in bulk).  ? Fresh seafood.  ? Poultry and eggs.  ? Low-fat dairy products.  · Buy whole ingredients instead of prepackaged foods.  · Buy fresh fruits and vegetables in-season from local farmers markets.  · Buy frozen fruits and vegetables in resealable bags.  · If you do not have access to quality fresh seafood, buy precooked frozen shrimp or canned fish, such as tuna, salmon, or sardines.  · Buy small amounts of raw or cooked vegetables, salads, or olives from the deli or salad bar at your store.  · Stock your pantry so you always have certain foods on hand, such as olive oil, canned tuna, canned tomatoes, rice, pasta, and beans.  Cooking  · Cook foods with extra-virgin olive oil instead of using butter or other vegetable oils.  · Have meat as a side dish, and have vegetables or grains as your main dish. This means having meat in small portions or adding small amounts of meat to foods like pasta or stew.  · Use beans or vegetables instead of meat in common dishes like chili or lasagna.  · [place] with different cooking methods. Try roasting or broiling vegetables instead of steaming or sautéeing them.  · Add frozen vegetables to soups, stews, pasta, or rice.  · Add nuts or seeds for added healthy fat at each meal. You can add these to yogurt, salads, or vegetable dishes.  · Marinate fish or vegetables using olive oil, lemon juice, garlic, and fresh herbs.  Meal planning    · Plan to eat 1 vegetarian meal one day each week. Try to work up to 2 vegetarian meals, if possible.  · Eat seafood 2 or more times a week.  · Have healthy snacks readily available, such as:  ? Vegetable sticks with hummus.  ? Greek yogurt.  ? Fruit and nut trail mix.  · Eat balanced meals throughout the week. This includes:  ? Fruit: 2-3 servings a day  ? Vegetables: 4-5 servings a day  ? Low-fat dairy: 2  servings a day  ? Fish, poultry, or lean meat: 1 serving a day  ? Beans and legumes: 2 or more servings a week  ? Nuts and seeds: 1-2 servings a day  ? Whole grains: 6-8 servings a day  ? Extra-virgin olive oil: 3-4 servings a day  · Limit red meat and sweets to only a few servings a month  What are my food choices?  · Mediterranean diet  ? Recommended  § Grains: Whole-grain pasta. Brown rice. Bulgar wheat. Polenta. Couscous. Whole-wheat bread. Oatmeal. Quinoa.  § Vegetables: Artichokes. Beets. Broccoli. Cabbage. Carrots. Eggplant. Green beans. Chard. Kale. Spinach. Onions. Leeks. Peas. Squash. Tomatoes. Peppers. Radishes.  § Fruits: Apples. Apricots. Avocado. Berries. Bananas. Cherries. Dates. Figs. Grapes. [place]. Melon. Oranges. Peaches. Plums. Pomegranate.  § Meats and other protein foods: Beans. Almonds. Sunflower seeds. Pine nuts. Peanuts. Cod. [place]. Scallops. Shrimp. Tuna. Tilapia. Clams. Oysters. Eggs.  § Dairy: Low-fat milk. Cheese. Greek yogurt.  § Beverages: Water. Red wine. Herbal tea.  § Fats and oils: Extra virgin olive oil. Avocado oil. Grape seed oil.  § Sweets and desserts: Greek yogurt with honey. Baked apples. Poached pears. Trail mix.  § Seasoning and other foods: Basil. Cilantro. Coriander. Cumin. Mint. Parsley. Jaya. Rosemary. Tarragon. Garlic. Oregano. Thyme. Pepper. Balsalmic vinegar. Tahini. Hummus. Tomato sauce. Olives. Mushrooms.  ? Limit these  § Grains: Prepackaged pasta or rice dishes. Prepackaged cereal with added sugar.  § Vegetables: Deep fried potatoes (french fries).  § Fruits: Fruit canned in syrup.  § Meats and other protein foods: Beef. Pork. Lamb. Poultry with skin. Hot dogs. Dinh.  § Dairy: Ice cream. Sour cream. Whole milk.  § Beverages: Juice. Sugar-sweetened soft drinks. Beer. Liquor and spirits.  § Fats and oils: Butter. Canola oil. Vegetable oil. Beef fat (tallow). Lard.  § Sweets and desserts: Cookies. Cakes. Pies. Candy.  § Seasoning and other foods: [facility].  Premade sauces and marinades.  The items listed may not be a complete list. Talk with your dietitian about what dietary choices are right for you.  Summary  · The Mediterranean diet includes both food and lifestyle choices.  · Eat a variety of fresh fruits and vegetables, beans, nuts, seeds, and whole grains.  · Limit the amount of red meat and sweets that you eat.  · Talk with your health care provider about whether it is safe for you to drink red wine in moderation. This means 1 glass a day for nonpregnant women and 2 glasses a day for men. A glass of wine equals 5 oz (150 mL).  This information is not intended to replace advice given to you by your health care provider. Make sure you discuss any questions you have with your health care provider.  Document Revised: 08/17/2017 Document Reviewed: 08/10/2017  [employer] Patient Education © 2020 [employer].

## 2021-06-08 NOTE — ASSESSMENT & PLAN NOTE
Hypertension is unchanged.  Dietary sodium restriction.  Weight loss.  Ambulatory blood pressure monitoring.  Blood pressure will be reassessed at the next regular appointment. May need to add med next visit. Continue working toward weight loss.

## 2021-06-09 ENCOUNTER — PATIENT MESSAGE (OUTPATIENT)
Dept: INTERNAL MEDICINE | Facility: CLINIC | Age: 56
End: 2021-06-09

## 2021-06-14 RX ORDER — LEVOTHYROXINE SODIUM 0.03 MG/1
25 TABLET ORAL DAILY
Qty: 90 TABLET | Refills: 3 | Status: SHIPPED | OUTPATIENT
Start: 2021-06-14 | End: 2021-09-10 | Stop reason: SDUPTHER

## 2021-06-14 RX ORDER — METFORMIN HYDROCHLORIDE 500 MG/1
500 TABLET, EXTENDED RELEASE ORAL
Qty: 90 TABLET | Refills: 3 | Status: SHIPPED | OUTPATIENT
Start: 2021-06-14 | End: 2022-04-10 | Stop reason: SDUPTHER

## 2021-06-14 NOTE — TELEPHONE ENCOUNTER
From: Suma Zurita MD  To: Dom Tania  Sent: 6/9/2021 9:57 AM EDT  Subject: labs    Thyroid-- normal TSH but low free T4, low enough to warrant going back on thyroid medicine if you're agreeable. This would also help the cholesterol. Below is information on underactive thyroid.    Diabetes mellitus-- hemoglobin remains diabetic range. Going on medicine such as metformin may decrease chance of diabetes mellitus worsening and may help with weight loss. Side effects can include nausea, abdominal pain, diarrhea at first but usually this resolves. This is the most common med we use for diabetes mellitus. You're nowhere near insulin but that's how we want to keep it.     I recommend resuming a low dose of thyroid med and adding the metformin. Please let me know your thoughts/opinion on this.     Dr. Zurita     .    Hypothyroidism    Hypothyroidism is when the thyroid gland does not make enough of certain hormones (it is underactive). The thyroid gland is a small gland located in the lower front part of the neck, just in front of the windpipe (trachea). This gland makes hormones that help control how the body uses food for energy (metabolism) as well as how the heart and brain function. These hormones also play a role in keeping your bones strong. When the thyroid is underactive, it produces too little of the hormones thyroxine (T4) and triiodothyronine (T3).  What are the causes?  This condition may be caused by:  Hashimoto's disease. This is a disease in which the body's disease-fighting system (immune system) attacks the thyroid gland. This is the most common cause.  Viral infections.  Pregnancy.  Certain medicines.  Birth defects.  Past radiation treatments to the head or neck for cancer.  Past treatment with radioactive iodine.  Past exposure to radiation in the environment.  Past surgical removal of part or all of the thyroid.  Problems with a gland in the center of the brain (pituitary gland).  Lack of  enough iodine in the diet.  What increases the risk?  You are more likely to develop this condition if:  You are female.  You have a family history of thyroid conditions.  You use a medicine called lithium.  You take medicines that affect the immune system (immunosuppressants).  What are the signs or symptoms?  Symptoms of this condition include:  Feeling as though you have no energy (lethargy).  Not being able to tolerate cold.  Weight gain that is not explained by a change in diet or exercise habits.  Lack of appetite.  Dry skin.  Coarse hair.  Menstrual irregularity.  Slowing of thought processes.  Constipation.  Sadness or depression.  How is this diagnosed?  This condition may be diagnosed based on:  Your symptoms, your medical history, and a physical exam.  Blood tests.  You may also have imaging tests, such as an ultrasound or MRI.  How is this treated?  This condition is treated with medicine that replaces the thyroid hormones that your body does not make. After you begin treatment, it may take several weeks for symptoms to go away.  Follow these instructions at home:  Take over-the-counter and prescription medicines only as told by your health care provider.  If you start taking any new medicines, tell your health care provider.  Keep all follow-up visits as told by your health care provider. This is important.  As your condition improves, your dosage of thyroid hormone medicine may change.  You will need to have blood tests regularly so that your health care provider can monitor your condition.  Contact a health care provider if:  Your symptoms do not get better with treatment.  You are taking thyroid replacement medicine and you:  Sweat a lot.  Have tremors.  Feel anxious.  Lose weight rapidly.  Cannot tolerate heat.  Have emotional swings.  Have diarrhea.  Feel weak.  Get help right away if you have:  Chest pain.  An irregular heartbeat.  A rapid heartbeat.  Difficulty breathing.  Summary  Hypothyroidism  is when the thyroid gland does not make enough of certain hormones (it is underactive).  When the thyroid is underactive, it produces too little of the hormones thyroxine (T4) and triiodothyronine (T3).  The most common cause is Hashimoto's disease, a disease in which the body's disease-fighting system (immune system) attacks the thyroid gland. The condition can also be caused by viral infections, medicine, pregnancy, or past radiation treatment to the head or neck.  Symptoms may include weight gain, dry skin, constipation, feeling as though you do not have energy, and not being able to tolerate cold.  This condition is treated with medicine to replace the thyroid hormones that your body does not make.  This information is not intended to replace advice given to you by your health care provider. Make sure you discuss any questions you have with your health care provider.  Document Revised: 11/30/2018 Document Reviewed: 11/28/2018  [email or web address] Patient Education © 2021 [employer].

## 2021-07-07 ENCOUNTER — NURSE NAVIGATOR (OUTPATIENT)
Dept: CT IMAGING | Facility: HOSPITAL | Age: 56
End: 2021-07-07

## 2021-07-07 ENCOUNTER — HOSPITAL ENCOUNTER (OUTPATIENT)
Dept: CT IMAGING | Facility: HOSPITAL | Age: 56
Discharge: HOME | End: 2021-07-07
Admitting: FAMILY MEDICINE

## 2021-07-07 PROCEDURE — 71271 CT THORAX LUNG CANCER SCR C-: CPT

## 2021-07-07 NOTE — PROGRESS NOTES
Met patient prior to imaging -shared educational information. Lung cancer screening flyer was given to patient.

## 2021-09-09 ENCOUNTER — OFFICE VISIT (OUTPATIENT)
Dept: INTERNAL MEDICINE | Facility: CLINIC | Age: 56
End: 2021-09-09

## 2021-09-09 VITALS
TEMPERATURE: 97.3 F | WEIGHT: 253.25 LBS | RESPIRATION RATE: 18 BRPM | DIASTOLIC BLOOD PRESSURE: 80 MMHG | OXYGEN SATURATION: 97 % | HEART RATE: 72 BPM | HEIGHT: 69 IN | SYSTOLIC BLOOD PRESSURE: 135 MMHG | BODY MASS INDEX: 37.51 KG/M2

## 2021-09-09 DIAGNOSIS — E78.5 HYPERLIPIDEMIA LDL GOAL <130: ICD-10-CM

## 2021-09-09 DIAGNOSIS — E66.01 CLASS 2 SEVERE OBESITY DUE TO EXCESS CALORIES WITH SERIOUS COMORBIDITY AND BODY MASS INDEX (BMI) OF 36.0 TO 36.9 IN ADULT (HCC): ICD-10-CM

## 2021-09-09 DIAGNOSIS — Z23 NEED FOR INFLUENZA VACCINATION: ICD-10-CM

## 2021-09-09 DIAGNOSIS — E03.9 ACQUIRED HYPOTHYROIDISM: ICD-10-CM

## 2021-09-09 DIAGNOSIS — E11.59 TYPE 2 DIABETES MELLITUS WITH OTHER CIRCULATORY COMPLICATION, WITHOUT LONG-TERM CURRENT USE OF INSULIN (HCC): Primary | ICD-10-CM

## 2021-09-09 DIAGNOSIS — Z51.81 MEDICATION MONITORING ENCOUNTER: ICD-10-CM

## 2021-09-09 DIAGNOSIS — I10 ESSENTIAL HYPERTENSION: ICD-10-CM

## 2021-09-09 LAB — HBA1C MFR BLD: 5.9 %

## 2021-09-09 PROCEDURE — 99214 OFFICE O/P EST MOD 30 MIN: CPT | Performed by: FAMILY MEDICINE

## 2021-09-09 PROCEDURE — 90686 IIV4 VACC NO PRSV 0.5 ML IM: CPT | Performed by: FAMILY MEDICINE

## 2021-09-09 PROCEDURE — 90471 IMMUNIZATION ADMIN: CPT | Performed by: FAMILY MEDICINE

## 2021-09-09 NOTE — ASSESSMENT & PLAN NOTE
Hypertension is improving with treatment.  Dietary sodium restriction.  Weight loss.  Continue current medications.  Blood pressure will be reassessed at the next regular appointment.

## 2021-09-09 NOTE — PROGRESS NOTES
"Subjective    Dom Marin is a 55 y.o. male here for:  Chief Complaint   Patient presents with   • Diabetes   • Hypertension   Answers for HPI/ROS submitted by the patient on 9/6/2021  What is the primary reason for your visit?: Other  Please describe your symptoms.: Check on cholesterol, diabetes and thyroid  Have you had these symptoms before?: Yes  How long have you been having these symptoms?: Greater than 2 weeks  Please list any medications you are currently taking for this condition.: Simvastatin, Metformin, Synthroid  Please describe any probable cause for these symptoms. : Weight, diet        History per MA reviewed.     Has had stressful busy summer, sold house, living out of camper until they can build new home, downsized.         The following portions of the patient's history were reviewed and updated as appropriate: allergies, current medications, past family history, past medical history, past social history, past surgical history and problem list.    Review of Systems   Constitutional: Positive for unexpected weight loss. Negative for fever.   Psychiatric/Behavioral: Positive for stress.       Objective   Visit Vitals  /80   Pulse 72   Temp 97.3 °F (36.3 °C) (Temporal)   Resp 18   Ht 176.5 cm (69.49\")   Wt 115 kg (253 lb 4 oz)   SpO2 97%   BMI 36.88 kg/m²     Wt Readings from Last 3 Encounters:   09/09/21 115 kg (253 lb 4 oz)   06/08/21 121 kg (267 lb 4 oz)   05/18/21 124 kg (272 lb 12.8 oz)         Physical Exam  Vitals and nursing note reviewed.   Constitutional:       General: He is not in acute distress.     Appearance: Normal appearance. He is well-developed and well-groomed. He is obese. He is not ill-appearing, toxic-appearing or diaphoretic.      Interventions: Face mask in place.   HENT:      Head: Normocephalic and atraumatic.      Right Ear: Hearing normal.      Left Ear: Hearing normal.   Eyes:      General: Lids are normal. No scleral icterus.     Extraocular Movements: " Extraocular movements intact.   Pulmonary:      Effort: Pulmonary effort is normal.   Musculoskeletal:      Cervical back: Neck supple.   Skin:     Coloration: Skin is not jaundiced or pale.   Neurological:      General: No focal deficit present.      Mental Status: He is alert and oriented to person, place, and time.   Psychiatric:         Attention and Perception: Attention and perception normal.         Mood and Affect: Mood and affect normal.         Speech: Speech normal.         Behavior: Behavior normal. Behavior is cooperative.         Thought Content: Thought content normal.         Cognition and Memory: Cognition and memory normal.         Judgment: Judgment normal.         For medical decision making review of the following was required:  Lab Results   Component Value Date    HGBA1C 5.9 09/09/2021    HGBA1C 6.50 (H) 06/08/2021    HGBA1C 6.50 (H) 12/04/2020     Lab Results   Component Value Date    TSH 2.260 06/08/2021     Lab Results   Component Value Date    FREET4 0.69 (L) 06/08/2021     Lab Results   Component Value Date    CHLPL 187 06/08/2021    TRIG 168 (H) 06/08/2021    HDL 44 06/08/2021     (H) 06/08/2021         Assessment/Plan     Problem List Items Addressed This Visit        Cardiac and Vasculature    Hyperlipidemia LDL goal <130    Relevant Orders    Lipid Panel    Comprehensive Metabolic Panel    Essential hypertension    Current Assessment & Plan     Hypertension is improving with treatment.  Dietary sodium restriction.  Weight loss.  Continue current medications.  Blood pressure will be reassessed at the next regular appointment.         Relevant Orders    CBC (No Diff)       Endocrine and Metabolic    Type 2 diabetes mellitus with circulatory disorder, without long-term current use of insulin (CMS/[facility]) - Primary    Overview     Associated with hyperlipidemia.    Lab Results   Component Value Date    HGBA1C 6.50 (H) 12/04/2020    HGBA1C 6.90 (H) 02/18/2020    HGBA1C 6.90 02/25/2019                Relevant Orders    POC Glycosylated Hemoglobin (Hb A1C) (Completed)    TSH+Free T4    Comprehensive Metabolic Panel      Other Visit Diagnoses     Acquired hypothyroidism        Relevant Orders    TSH+Free T4    CBC (No Diff)    Need for influenza vaccination        Relevant Orders    FluLaval >6 Months (8039-8311) (Completed)    Medication monitoring encounter        metformin may interfere with b12 absorption; patient is taking B complex supplement otc    Relevant Orders    Vitamin B12 & Folate    CBC (No Diff)    Class 2 severe obesity due to excess calories with serious comorbidity and body mass index (BMI) of 36.0 to 36.9 in adult (CMS/[facility])        patient is down approx 20 lb since early summer 2021          · Patient's Body mass index is 36.88 kg/m². indicating that he is obese (BMI >30). Obesity-related health conditions include the following: hypertension, diabetes mellitus and dyslipidemias. Obesity is improving with lifestyle modifications. BMI is is above average; BMI management plan is completed. We discussed portion control and increasing exercise..     Return in about 4 months (around 1/9/2022) for Diabetes follow up.   Suma Zurita MD

## 2021-09-10 DIAGNOSIS — E03.9 ACQUIRED HYPOTHYROIDISM: Primary | ICD-10-CM

## 2021-09-10 LAB
ALBUMIN SERPL-MCNC: 4.5 G/DL (ref 3.5–5.2)
ALBUMIN/GLOB SERPL: 1.6 G/DL
ALP SERPL-CCNC: 82 U/L (ref 39–117)
ALT SERPL-CCNC: 25 U/L (ref 1–41)
AST SERPL-CCNC: 21 U/L (ref 1–40)
BILIRUB SERPL-MCNC: 0.4 MG/DL (ref 0–1.2)
BUN SERPL-MCNC: 11 MG/DL (ref 6–20)
BUN/CREAT SERPL: 12.6 (ref 7–25)
CALCIUM SERPL-MCNC: 8.8 MG/DL (ref 8.6–10.5)
CHLORIDE SERPL-SCNC: 104 MMOL/L (ref 98–107)
CHOLEST SERPL-MCNC: 113 MG/DL (ref 0–200)
CO2 SERPL-SCNC: 23.7 MMOL/L (ref 22–29)
CREAT SERPL-MCNC: 0.87 MG/DL (ref 0.76–1.27)
ERYTHROCYTE [DISTWIDTH] IN BLOOD BY AUTOMATED COUNT: 12.5 % (ref 12.3–15.4)
FOLATE SERPL-MCNC: 9.77 NG/ML (ref 4.78–24.2)
GLOBULIN SER CALC-MCNC: 2.9 GM/DL
GLUCOSE SERPL-MCNC: 95 MG/DL (ref 65–99)
HCT VFR BLD AUTO: 44.3 % (ref 37.5–51)
HDLC SERPL-MCNC: 47 MG/DL (ref 40–60)
HGB BLD-MCNC: 14.9 G/DL (ref 13–17.7)
LDLC SERPL CALC-MCNC: 43 MG/DL (ref 0–100)
MCH RBC QN AUTO: 31 PG (ref 26.6–33)
MCHC RBC AUTO-ENTMCNC: 33.6 G/DL (ref 31.5–35.7)
MCV RBC AUTO: 92.3 FL (ref 79–97)
PLATELET # BLD AUTO: 246 10*3/MM3 (ref 140–450)
POTASSIUM SERPL-SCNC: 4.7 MMOL/L (ref 3.5–5.2)
PROT SERPL-MCNC: 7.4 G/DL (ref 6–8.5)
RBC # BLD AUTO: 4.8 10*6/MM3 (ref 4.14–5.8)
SODIUM SERPL-SCNC: 141 MMOL/L (ref 136–145)
T4 FREE SERPL-MCNC: 0.8 NG/DL (ref 0.93–1.7)
TRIGL SERPL-MCNC: 131 MG/DL (ref 0–150)
TSH SERPL DL<=0.005 MIU/L-ACNC: 1.74 UIU/ML (ref 0.27–4.2)
VIT B12 SERPL-MCNC: 561 PG/ML (ref 211–946)
VLDLC SERPL CALC-MCNC: 23 MG/DL (ref 5–40)
WBC # BLD AUTO: 9.51 10*3/MM3 (ref 3.4–10.8)

## 2021-09-10 RX ORDER — LEVOTHYROXINE SODIUM 0.03 MG/1
37.5 TABLET ORAL DAILY
Qty: 135 TABLET | Refills: 3 | Status: SHIPPED | OUTPATIENT
Start: 2021-09-10 | End: 2022-04-10 | Stop reason: SDUPTHER

## 2022-01-11 ENCOUNTER — OFFICE VISIT (OUTPATIENT)
Dept: INTERNAL MEDICINE | Facility: CLINIC | Age: 57
End: 2022-01-11

## 2022-01-11 VITALS
HEART RATE: 70 BPM | DIASTOLIC BLOOD PRESSURE: 84 MMHG | RESPIRATION RATE: 16 BRPM | SYSTOLIC BLOOD PRESSURE: 138 MMHG | HEIGHT: 69 IN | WEIGHT: 254.2 LBS | TEMPERATURE: 97.5 F | BODY MASS INDEX: 37.65 KG/M2 | OXYGEN SATURATION: 96 %

## 2022-01-11 DIAGNOSIS — E03.9 ACQUIRED HYPOTHYROIDISM: ICD-10-CM

## 2022-01-11 DIAGNOSIS — E11.59 TYPE 2 DIABETES MELLITUS WITH OTHER CIRCULATORY COMPLICATION, WITHOUT LONG-TERM CURRENT USE OF INSULIN: Primary | ICD-10-CM

## 2022-01-11 DIAGNOSIS — R23.4 CRACKED SKIN: ICD-10-CM

## 2022-01-11 LAB
EXPIRATION DATE: NORMAL
HBA1C MFR BLD: 5.9 %
Lab: NORMAL

## 2022-01-11 PROCEDURE — 83036 HEMOGLOBIN GLYCOSYLATED A1C: CPT | Performed by: FAMILY MEDICINE

## 2022-01-11 PROCEDURE — 99214 OFFICE O/P EST MOD 30 MIN: CPT | Performed by: FAMILY MEDICINE

## 2022-01-11 NOTE — PROGRESS NOTES
Subjective    Dom Marin is a 56 y.o. male here for:  Chief Complaint   Patient presents with   • Diabetes     4 month follow up, A1c     Answers for HPI/ROS submitted by the patient on 1/10/2022  What is the primary reason for your visit?: Diabetes      History per MA reviewed.     Hypothyroidism: out of medicine since Sunday. Dose increase from last visit didn't change how he felt.    Diabetes  He presents for his follow-up diabetic visit. He has type 2 diabetes mellitus. No MedicAlert identification noted. The initial diagnosis of diabetes was made 2 years ago. Hypoglycemia symptoms include sleepiness. Pertinent negatives for hypoglycemia include no confusion, dizziness, headaches, hunger, mood changes, nervousness/anxiousness, pallor, seizures, speech difficulty, sweats or tremors. Associated symptoms include polyuria. Pertinent negatives for diabetes include no blurred vision, no chest pain, no fatigue, no foot paresthesias, no foot ulcerations, no polydipsia, no polyphagia, no visual change, no weakness and no weight loss. Pertinent negatives for hypoglycemia complications include no blackouts, no hospitalization, no nocturnal hypoglycemia, no required assistance and no required glucagon injection. Symptoms are stable. Pertinent negatives for diabetic complications include no CVA, heart disease, impotence, nephropathy, peripheral neuropathy, PVD or retinopathy. Risk factors for coronary artery disease include dyslipidemia, obesity, stress and tobacco exposure. Current diabetic treatment includes oral agent (monotherapy). He is compliant with treatment all of the time. His weight is fluctuating minimally. He is following a generally healthy diet. When asked about meal planning, he reported none. He has not had a previous visit with a dietitian. He rarely participates in exercise. He monitors urine at home <1 x per month. There is no compliance with monitoring of blood glucose. He does not see a  "podiatrist.Eye exam is not current.          The following portions of the patient's history were reviewed and updated as appropriate: allergies, current medications, past family history, past medical history, past social history, past surgical history and problem list.    Review of Systems   Constitutional: Negative for fatigue and unexpected weight loss.   Eyes: Negative for blurred vision.   Cardiovascular: Negative for chest pain.   Endocrine: Positive for polyuria. Negative for polydipsia and polyphagia.   Genitourinary: Negative for impotence.   Skin: Positive for dry skin (hands) and skin lesions. Negative for pallor.   Neurological: Negative for dizziness, tremors, seizures, speech difficulty, weakness and confusion.   Psychiatric/Behavioral: The patient is not nervous/anxious.        Objective   Visit Vitals  /84 (BP Location: Left arm, Patient Position: Sitting, Cuff Size: Adult)   Pulse 70   Temp 97.5 °F (36.4 °C) (Temporal)   Resp 16   Ht 176.5 cm (69.49\")   Wt 115 kg (254 lb 3.2 oz)   SpO2 96%   BMI 37.01 kg/m²       Physical Exam  Vitals and nursing note reviewed.   Constitutional:       General: He is not in acute distress.     Appearance: Normal appearance. He is well-developed and well-groomed. He is obese. He is not ill-appearing, toxic-appearing or diaphoretic.      Interventions: Face mask in place.   HENT:      Head: Normocephalic and atraumatic.      Right Ear: Hearing normal.      Left Ear: Hearing normal.   Eyes:      General: Lids are normal. No scleral icterus.     Extraocular Movements: Extraocular movements intact.   Cardiovascular:      Rate and Rhythm: Normal rate and regular rhythm.   Pulmonary:      Effort: Pulmonary effort is normal.   Musculoskeletal:      Cervical back: Neck supple.   Skin:     Coloration: Skin is not jaundiced or pale.      Comments: Cracked skin on some finger tips   Neurological:      General: No focal deficit present.      Mental Status: He is alert and " oriented to person, place, and time.   Psychiatric:         Attention and Perception: Attention and perception normal.         Mood and Affect: Mood and affect normal.         Speech: Speech normal.         Behavior: Behavior normal. Behavior is cooperative.         Thought Content: Thought content normal.         Cognition and Memory: Cognition and memory normal.         Judgment: Judgment normal.         For medical decision making review of the following was required:  Lab Results   Component Value Date    HGBA1C 5.9 01/11/2022    HGBA1C 5.9 09/09/2021    HGBA1C 6.50 (H) 06/08/2021     Lab Results   Component Value Date    TSH 1.740 09/09/2021     Lab Results   Component Value Date    FREET4 0.80 (L) 09/09/2021         Assessment/Plan     Problem List Items Addressed This Visit        Endocrine and Metabolic    Type 2 diabetes mellitus with circulatory disorder, without long-term current use of insulin (HCC) - Primary    Overview     Associated with hyperlipidemia.    Lab Results   Component Value Date    HGBA1C 6.50 (H) 12/04/2020    HGBA1C 6.90 (H) 02/18/2020    HGBA1C 6.90 02/25/2019               Relevant Orders    POC Glycosylated Hemoglobin (Hb A1C) (Completed)    Hemoglobin A1c      Other Visit Diagnoses     Acquired hypothyroidism        Relevant Orders    TSH+Free T4    Cracked skin        discussed use of cerave plus lidex to sole side of hand          · Holding off on checking labs today as he's been w/o levothyroxine. Recheck in approx 3 months with A1C, follow up in office for physical in approx 6 months (must have 3 months between A1C checks).     Return in about 7 months (around 8/1/2022) for Annual physical.   Suma Zurita MD

## 2022-04-10 DIAGNOSIS — E78.5 HYPERLIPIDEMIA LDL GOAL <130: ICD-10-CM

## 2022-04-10 DIAGNOSIS — E03.9 ACQUIRED HYPOTHYROIDISM: ICD-10-CM

## 2022-04-11 RX ORDER — METFORMIN HYDROCHLORIDE 500 MG/1
500 TABLET, EXTENDED RELEASE ORAL
Qty: 90 TABLET | Refills: 3 | Status: SHIPPED | OUTPATIENT
Start: 2022-04-11

## 2022-04-11 RX ORDER — SIMVASTATIN 40 MG
40 TABLET ORAL NIGHTLY
Qty: 90 TABLET | Refills: 3 | Status: SHIPPED | OUTPATIENT
Start: 2022-04-11

## 2022-04-11 RX ORDER — LEVOTHYROXINE SODIUM 0.03 MG/1
37.5 TABLET ORAL DAILY
Qty: 135 TABLET | Refills: 3 | Status: SHIPPED | OUTPATIENT
Start: 2022-04-11 | End: 2022-04-13

## 2022-04-13 DIAGNOSIS — E03.9 ACQUIRED HYPOTHYROIDISM: ICD-10-CM

## 2022-04-13 RX ORDER — LEVOTHYROXINE SODIUM 0.05 MG/1
50 TABLET ORAL DAILY
Qty: 90 TABLET | Refills: 3 | Status: SHIPPED | OUTPATIENT
Start: 2022-04-13 | End: 2022-11-17 | Stop reason: DRUGHIGH

## 2022-05-31 ENCOUNTER — OFFICE VISIT (OUTPATIENT)
Dept: NEUROLOGY | Facility: CLINIC | Age: 57
End: 2022-05-31

## 2022-05-31 VITALS
OXYGEN SATURATION: 94 % | WEIGHT: 231 LBS | DIASTOLIC BLOOD PRESSURE: 80 MMHG | BODY MASS INDEX: 34.21 KG/M2 | SYSTOLIC BLOOD PRESSURE: 128 MMHG | HEART RATE: 85 BPM | TEMPERATURE: 97.3 F | HEIGHT: 69 IN

## 2022-05-31 DIAGNOSIS — G47.33 OBSTRUCTIVE SLEEP APNEA: Primary | ICD-10-CM

## 2022-05-31 PROCEDURE — 99213 OFFICE O/P EST LOW 20 MIN: CPT | Performed by: NURSE PRACTITIONER

## 2022-05-31 NOTE — PROGRESS NOTES
"     Follow Up Office Visit      Patient Name: Dom Marin  : 1965   MRN: [id number]     Chief Complaint:    Chief Complaint   Patient presents with   • Follow-up     Pt in office for follow up from sleep study.Pt states that he has not use his machine since sept when it was recalled but states he is sleeping fine.       History of Present Illness: Dom Marin is a 56 y.o. male who is here today to follow up with MILDRED and was last seen on 2021.  He is no longer using his CPAP machine (initially due to the [employer] recall last year) and states, \"Well, I didn't want to die and I am sleeping fine without it and I've lost 50 pounds\".  He is no longer snoring; sleeping 4-5 hours of sleep per night; not waking up with a dry mouth; denies excessive daytime sleepiness.  He thinks his initial diagnosis of MILDRED was mild.  Additional risk factors- BMI 34, dyslipidemia, hypothyroidism, diabetes type 2.     Following taken from previous visit note:  Dom Marin is a 55 y.o. male who is here today to follow up for MILDRED.  He received a new AutoPap machine since his last visit and he is tolerating his pressures well.  Currently on AutoPap 6/16cm, compliance >4 hours 56.7%, overall compliance 63.3%, AHI 4/hour.  He says his compliance has been down because he was out of town one day and he does not sleep much.  Additional risk factors- BMI 39, dyslipidemia.       Subjective      Review of Systems:   Review of Systems   Constitutional: Negative for chills, fatigue and fever.   HENT: Negative for facial swelling, hearing loss, sore throat, tinnitus and trouble swallowing.    Eyes: Negative for blurred vision, double vision, photophobia and visual disturbance.   Respiratory: Negative for cough, chest tightness and shortness of breath.    Cardiovascular: Negative for chest pain, palpitations and leg swelling.   Gastrointestinal: Negative for abdominal pain, nausea and vomiting.   Endocrine: Negative for cold intolerance " "and heat intolerance.   Musculoskeletal: Negative for gait problem, neck pain and neck stiffness.   Skin: Negative for color change and rash.   Allergic/Immunologic: Negative for environmental allergies and food allergies.   Neurological: Negative for dizziness, syncope, speech difficulty, weakness, light-headedness, numbness, headache and memory problem.   Psychiatric/Behavioral: Negative for behavioral problems, sleep disturbance and depressed mood. The patient is not nervous/anxious.        I have reviewed and the following portions of the patient's history were updated as appropriate: past family history, past medical history, past social history, past surgical history and problem list.    Medications:     Current Outpatient Medications:   •  Ascorbic Acid (VITAMIN C) 500 MG capsule, Take  by mouth., Disp: , Rfl:   •  B Complex Vitamins (VITAMIN B COMPLEX PO), Take  by mouth., Disp: , Rfl:   •  levothyroxine (SYNTHROID, LEVOTHROID) 50 MCG tablet, Take 1 tablet by mouth Daily. Take on empty stomach at least 30 min before eating and other meds., Disp: 90 tablet, Rfl: 3  •  metFORMIN ER (GLUCOPHAGE-XR) 500 MG 24 hr tablet, Take 1 tablet by mouth Daily With Breakfast. Indications: Type 2 Diabetes, Disp: 90 tablet, Rfl: 3  •  simvastatin (ZOCOR) 40 MG tablet, Take 1 tablet by mouth Every Night. Indications: High Amount of Fats in the Blood, Disp: 90 tablet, Rfl: 3    Allergies:   No Known Allergies    Objective     Physical Exam:  Vital Signs:   Vitals:    05/31/22 1414   BP: 128/80   BP Location: Right arm   Patient Position: Sitting   Cuff Size: Adult   Pulse: 85   Temp: 97.3 °F (36.3 °C)   SpO2: 94%   Weight: 105 kg (231 lb)   Height: 175.3 cm (69\")   PainSc: 0-No pain     Body mass index is 34.11 kg/m².    Physical Exam  Vitals and nursing note reviewed.   Constitutional:       General: He is not in acute distress.     Appearance: Normal appearance. He is well-developed. He is not diaphoretic.   HENT:      Head: " Normocephalic and atraumatic.   Eyes:      Extraocular Movements: Extraocular movements intact.      Conjunctiva/sclera: Conjunctivae normal.   Pulmonary:      Effort: Pulmonary effort is normal. No respiratory distress.   Skin:     General: Skin is dry.      Findings: No rash.   Neurological:      Mental Status: He is alert and oriented to person, place, and time.   Psychiatric:         Mood and Affect: Mood normal.         Behavior: Behavior normal.         Thought Content: Thought content normal.         Judgment: Judgment normal.         Neurologic Exam     Mental Status   Oriented to person, place, and time.        Assessment / Plan      Assessment/Plan:   Diagnoses and all orders for this visit:    1. Obstructive sleep apnea (Primary)    2. BMI 34.0-34.9,adult    *Advised to avoid driving if drowsy. Encouraged continued weight loss with a BMI goal of 24.   *Follow up if symptoms of significant MILDRED return.     Follow Up:   Return if symptoms worsen or fail to improve.    IMELDA Webster, FNP-C  [facility]       Please note that portions of this note may have been completed with a voice recognition program. Efforts were made to edit the dictations, but occasionally words are mistranscribed.

## 2022-11-16 ENCOUNTER — OFFICE VISIT (OUTPATIENT)
Dept: INTERNAL MEDICINE | Facility: CLINIC | Age: 57
End: 2022-11-16

## 2022-11-16 VITALS
HEART RATE: 69 BPM | BODY MASS INDEX: 34.07 KG/M2 | SYSTOLIC BLOOD PRESSURE: 126 MMHG | DIASTOLIC BLOOD PRESSURE: 84 MMHG | OXYGEN SATURATION: 98 % | HEIGHT: 69 IN | TEMPERATURE: 97.2 F | WEIGHT: 230 LBS

## 2022-11-16 DIAGNOSIS — Z23 NEED FOR COVID-19 VACCINE: ICD-10-CM

## 2022-11-16 DIAGNOSIS — I10 ESSENTIAL HYPERTENSION: ICD-10-CM

## 2022-11-16 DIAGNOSIS — E11.59 TYPE 2 DIABETES MELLITUS WITH OTHER CIRCULATORY COMPLICATION, WITHOUT LONG-TERM CURRENT USE OF INSULIN: ICD-10-CM

## 2022-11-16 DIAGNOSIS — Z51.81 MEDICATION MONITORING ENCOUNTER: ICD-10-CM

## 2022-11-16 DIAGNOSIS — E78.5 HYPERLIPIDEMIA LDL GOAL <130: ICD-10-CM

## 2022-11-16 DIAGNOSIS — E66.09 CLASS 1 OBESITY DUE TO EXCESS CALORIES WITH SERIOUS COMORBIDITY AND BODY MASS INDEX (BMI) OF 33.0 TO 33.9 IN ADULT: ICD-10-CM

## 2022-11-16 DIAGNOSIS — Z53.20 LUNG CANCER SCREENING DECLINED BY PATIENT: ICD-10-CM

## 2022-11-16 DIAGNOSIS — E03.9 ACQUIRED HYPOTHYROIDISM: ICD-10-CM

## 2022-11-16 DIAGNOSIS — Z00.00 ANNUAL PHYSICAL EXAM: Primary | ICD-10-CM

## 2022-11-16 DIAGNOSIS — Z12.5 PROSTATE CANCER SCREENING: ICD-10-CM

## 2022-11-16 DIAGNOSIS — R79.9 ABNORMAL BLOOD CHEMISTRY: ICD-10-CM

## 2022-11-16 LAB
POC CREATININE URINE: 300
POC MICROALBUMIN URINE: 10

## 2022-11-16 PROCEDURE — 0124A PR ADM SARSCOV2 30MCG/0.3ML BST: CPT | Performed by: FAMILY MEDICINE

## 2022-11-16 PROCEDURE — 82044 UR ALBUMIN SEMIQUANTITATIVE: CPT | Performed by: FAMILY MEDICINE

## 2022-11-16 PROCEDURE — 91312 COVID-19 (PFIZER) BIVALENT BOOSTER 12+YRS: CPT | Performed by: FAMILY MEDICINE

## 2022-11-16 PROCEDURE — 99396 PREV VISIT EST AGE 40-64: CPT | Performed by: FAMILY MEDICINE

## 2022-11-16 NOTE — PATIENT INSTRUCTIONS

## 2022-11-16 NOTE — PROGRESS NOTES
11/16/2022  Chief Complaint   Patient presents with   • Annual Exam     Pt is fasting       Patient Care Team:  Suma Zurita MD as PCP - General (Family Medicine)  Fatoumata Tracy APRN as Nurse Practitioner (Nurse Practitioner)]       Dom Marin is here for his annual preventive exam. History per MA reviewed.       Dom Marin has the following medical issues:  Patient Active Problem List    Diagnosis    • Essential hypertension [I10]    • History of shingles [Z86.19]    • Type 2 diabetes mellitus with circulatory disorder, without long-term current use of insulin (HCC) [E11.59]    • MILDRED (obstructive sleep apnea) [G47.33]    • Hyperlipidemia LDL goal <130 [E78.5]        Health Maintenance   Topic Date Due   • COVID-19 Vaccine (5 - Booster for Moderna series) 06/08/2022   • URINE MICROALBUMIN  06/08/2022   • LIPID PANEL  09/09/2022   • HEMOGLOBIN A1C  10/11/2022   • DIABETIC FOOT EXAM  12/01/2022 (Originally 6/8/2022)   • LUNG CANCER SCREENING  12/01/2022 (Originally 7/7/2022)   • DIABETIC EYE EXAM  01/04/2023 (Originally 1/24/2018)   • ZOSTER VACCINE (1 of 2) 11/16/2023 (Originally 11/26/2014)   • ANNUAL PHYSICAL  11/17/2023   • COLORECTAL CANCER SCREENING  01/01/2025   • TDAP/TD VACCINES (2 - Td or Tdap) 02/18/2030   • HEPATITIS C SCREENING  Completed   • Pneumococcal Vaccine 0-64  Completed   • INFLUENZA VACCINE  Completed   • Hepatitis B  Discontinued       Immunization History   Administered Date(s) Administered   • COVID-19 (MODERNA) 1st, 2nd, 3rd Dose Only 01/28/2021, 03/09/2021, 11/22/2021, 04/13/2022   • FluLaval/Fluzone >6mos 09/09/2021   • Influenza, Unspecified 10/03/2022   • Pneumococcal Conjugate 20-Valent (PCV20) 04/13/2022   • Pneumococcal Polysaccharide (PPSV23) 06/08/2021   • Tdap 02/18/2020   • flucelvax quad pfs =>4 YRS 02/18/2020       Review of Systems   Constitutional: Negative for fever.   Respiratory: Negative for shortness of breath.    Cardiovascular: Negative for  "chest pain.   Gastrointestinal: Negative for abdominal pain.   Musculoskeletal:        Muscle pain shoulder, hip. Less flexible   Skin:        Cystic lesion left side of neck unchanged x years   All other systems reviewed and are negative.      The following portions of the patient's history were reviewed and updated as appropriate: allergies, current medications, past family history, past medical history, past social history, past surgical history and problem list.    Objective   Visit Vitals  /84   Pulse 69   Temp 97.2 °F (36.2 °C)   Ht 176.5 cm (69.49\")   Wt 104 kg (230 lb)   SpO2 98%   BMI 33.49 kg/m²        Physical Exam  Vitals and nursing note reviewed.   Constitutional:       General: He is not in acute distress.     Appearance: Normal appearance. He is well-developed and well-groomed. He is obese. He is not ill-appearing, toxic-appearing or diaphoretic.      Interventions: Face mask in place.   HENT:      Head: Normocephalic and atraumatic.      Right Ear: Hearing, tympanic membrane, ear canal and external ear normal.      Left Ear: Hearing, tympanic membrane, ear canal and external ear normal.   Eyes:      General: Lids are normal. Gaze aligned appropriately. No scleral icterus.        Right eye: No discharge.         Left eye: No discharge.      Extraocular Movements: Extraocular movements intact.      Conjunctiva/sclera: Conjunctivae normal.      Pupils: Pupils are equal, round, and reactive to light.   Neck:      Thyroid: No thyromegaly.      Trachea: Phonation normal.     Cardiovascular:      Rate and Rhythm: Normal rate and regular rhythm.      Heart sounds: Normal heart sounds.   Pulmonary:      Effort: Pulmonary effort is normal.      Breath sounds: Normal breath sounds and air entry.   Musculoskeletal:         General: No deformity or signs of injury.      Cervical back: Neck supple.   Skin:     General: Skin is warm.      Capillary Refill: Capillary refill takes less than 2 seconds.      " Coloration: Skin is not cyanotic, jaundiced or pale.      Findings: No rash.   Neurological:      General: No focal deficit present.      Mental Status: He is alert and oriented to person, place, and time. Mental status is at baseline.      Cranial Nerves: No dysarthria.      Motor: No tremor, abnormal muscle tone or seizure activity.      Gait: Gait is intact.   Psychiatric:         Attention and Perception: Attention and perception normal.         Mood and Affect: Mood and affect normal.         Speech: Speech normal.         Behavior: Behavior normal. Behavior is cooperative.         Thought Content: Thought content normal.         Cognition and Memory: Cognition and memory normal.         Judgment: Judgment normal.         Lab Results   Component Value Date    CHLPL 113 09/09/2021    TRIG 131 09/09/2021    HDL 47 09/09/2021    LDL 43 09/09/2021     Lab Results   Component Value Date    TSH 1.610 04/11/2022     Lab Results   Component Value Date    FREET4 0.82 (L) 04/11/2022     Lab Results   Component Value Date    HGBA1C 5.90 (H) 04/11/2022    HGBA1C 5.9 01/11/2022    HGBA1C 5.9 09/09/2021       Assessment   Diagnoses and all orders for this visit:    1. Annual physical exam (Primary)    2. Type 2 diabetes mellitus with other circulatory complication, without long-term current use of insulin (HCC)  -     POC Microalbumin  -     Hemoglobin A1c  -     Comprehensive Metabolic Panel    3. Hyperlipidemia LDL goal <130  -     Lipid Panel  -     Comprehensive Metabolic Panel    4. Acquired hypothyroidism  -     TSH+Free T4    5. Essential hypertension  -     TSH+Free T4    6. Prostate cancer screening  -     PSA Screen    7. Need for COVID-19 vaccine  -     COVID-19 Bivalent Booster (Pfizer) 12+yrs    8. Medication monitoring encounter  -     Hemoglobin A1c  -     Lipid Panel  -     TSH+Free T4  -     Vitamin B12  -     Folate  -     CBC (No Diff)  -     Comprehensive Metabolic Panel    9. Abnormal blood chemistry  -      Hemoglobin A1c  -     Lipid Panel  -     TSH+Free T4  -     Vitamin B12  -     Folate  -     CBC (No Diff)  -     Comprehensive Metabolic Panel    10. Lung cancer screening declined by patient  Comments:  normal in 2021; consider repeating in 2023    11. Class 1 obesity due to excess calories with serious comorbidity and body mass index (BMI) of 33.0 to 33.9 in adult  Comments:  information on Mediterranean diet via avs         · Health maintenance information provided with patient plan.   · Counseled on age appropriate health screenings. Encouraged pt to monitor feet.  · Immunizations for age discussed, encouraged.   · Encouraged healthy habits such as exercise, healthy diet.  BMI is >= 30 and <35. (Class 1 Obesity). The following options were offered after discussion;: weight loss educational material (shared in after visit summary)  · Adding med such as lisinopril for renal protection recommended due to diabetes mellitus but pt not keen on adding another med, which is understandable. Blood pressure borderline, goal <130/80. He'll be moving out of his camper soon, hopefully, and has a room in new home for exercise. Continue working on gradual weight loss. No longer snores since losing weight (was 277 lb in 2020) so no longer using CPAP.   ·   · Return in about 6 months (around 5/16/2023) for Diabetes follow up.     Suma Zurita MD

## 2022-11-17 DIAGNOSIS — E03.9 ACQUIRED HYPOTHYROIDISM: Primary | ICD-10-CM

## 2022-11-17 LAB
ALBUMIN SERPL-MCNC: 4.5 G/DL (ref 3.5–5.2)
ALBUMIN/GLOB SERPL: 1.8 G/DL
ALP SERPL-CCNC: 74 U/L (ref 39–117)
ALT SERPL-CCNC: 17 U/L (ref 1–41)
AST SERPL-CCNC: 17 U/L (ref 1–40)
BILIRUB SERPL-MCNC: 0.5 MG/DL (ref 0–1.2)
BUN SERPL-MCNC: 11 MG/DL (ref 6–20)
BUN/CREAT SERPL: 12.4 (ref 7–25)
CALCIUM SERPL-MCNC: 9.3 MG/DL (ref 8.6–10.5)
CHLORIDE SERPL-SCNC: 102 MMOL/L (ref 98–107)
CHOLEST SERPL-MCNC: 131 MG/DL (ref 0–200)
CO2 SERPL-SCNC: 28 MMOL/L (ref 22–29)
CREAT SERPL-MCNC: 0.89 MG/DL (ref 0.76–1.27)
EGFRCR SERPLBLD CKD-EPI 2021: 100 ML/MIN/1.73
ERYTHROCYTE [DISTWIDTH] IN BLOOD BY AUTOMATED COUNT: 11.9 % (ref 12.3–15.4)
FOLATE SERPL-MCNC: 15.2 NG/ML (ref 4.78–24.2)
GLOBULIN SER CALC-MCNC: 2.5 GM/DL
GLUCOSE SERPL-MCNC: 106 MG/DL (ref 65–99)
HBA1C MFR BLD: 6 % (ref 4.8–5.6)
HCT VFR BLD AUTO: 48.7 % (ref 37.5–51)
HDLC SERPL-MCNC: 49 MG/DL (ref 40–60)
HGB BLD-MCNC: 15.5 G/DL (ref 13–17.7)
LDLC SERPL CALC-MCNC: 65 MG/DL (ref 0–100)
MCH RBC QN AUTO: 30.2 PG (ref 26.6–33)
MCHC RBC AUTO-ENTMCNC: 31.8 G/DL (ref 31.5–35.7)
MCV RBC AUTO: 94.7 FL (ref 79–97)
PLATELET # BLD AUTO: 248 10*3/MM3 (ref 140–450)
POTASSIUM SERPL-SCNC: 4.5 MMOL/L (ref 3.5–5.2)
PROT SERPL-MCNC: 7 G/DL (ref 6–8.5)
PSA SERPL-MCNC: 0.26 NG/ML (ref 0–4)
RBC # BLD AUTO: 5.14 10*6/MM3 (ref 4.14–5.8)
SODIUM SERPL-SCNC: 140 MMOL/L (ref 136–145)
T4 FREE SERPL-MCNC: 0.86 NG/DL (ref 0.93–1.7)
TRIGL SERPL-MCNC: 90 MG/DL (ref 0–150)
TSH SERPL DL<=0.005 MIU/L-ACNC: 2.14 UIU/ML (ref 0.27–4.2)
VIT B12 SERPL-MCNC: 678 PG/ML (ref 211–946)
VLDLC SERPL CALC-MCNC: 17 MG/DL (ref 5–40)
WBC # BLD AUTO: 12.22 10*3/MM3 (ref 3.4–10.8)

## 2022-11-17 RX ORDER — LEVOTHYROXINE SODIUM 0.07 MG/1
75 TABLET ORAL
Qty: 90 TABLET | Refills: 3 | Status: SHIPPED | OUTPATIENT
Start: 2022-11-17

## 2022-11-17 NOTE — PROGRESS NOTES
A1C shows sugar controlled but it is higher than last check; once patient is out of camper and able to eat better and exercise this may improve. We'll need to recheck A1C next visit. Continue metformin.    Suggest increase on thyroid med, free T4 remains low. Increasing from 50 mcg to 75 mcg. Repeat lab at next visit (doesn't require fasting).     Rest of his labs okay, results in normal range and/or similar to past checks.

## 2023-04-22 DIAGNOSIS — E78.5 HYPERLIPIDEMIA LDL GOAL <130: ICD-10-CM

## 2023-04-24 RX ORDER — METFORMIN HYDROCHLORIDE 500 MG/1
TABLET, EXTENDED RELEASE ORAL
Qty: 90 TABLET | Refills: 3 | Status: SHIPPED | OUTPATIENT
Start: 2023-04-24

## 2023-04-24 RX ORDER — SIMVASTATIN 40 MG
TABLET ORAL
Qty: 90 TABLET | Refills: 3 | Status: SHIPPED | OUTPATIENT
Start: 2023-04-24

## 2023-05-10 ENCOUNTER — OFFICE VISIT (OUTPATIENT)
Dept: INTERNAL MEDICINE | Facility: CLINIC | Age: 58
End: 2023-05-10
Payer: OTHER GOVERNMENT

## 2023-05-10 VITALS
RESPIRATION RATE: 16 BRPM | DIASTOLIC BLOOD PRESSURE: 76 MMHG | HEIGHT: 69 IN | BODY MASS INDEX: 35.55 KG/M2 | TEMPERATURE: 97.3 F | WEIGHT: 240 LBS | OXYGEN SATURATION: 98 % | SYSTOLIC BLOOD PRESSURE: 120 MMHG | HEART RATE: 61 BPM

## 2023-05-10 DIAGNOSIS — Z87.891 PERSONAL HISTORY OF TOBACCO USE, PRESENTING HAZARDS TO HEALTH: ICD-10-CM

## 2023-05-10 DIAGNOSIS — Z12.2 ENCOUNTER FOR SCREENING FOR LUNG CANCER: ICD-10-CM

## 2023-05-10 DIAGNOSIS — I10 ESSENTIAL HYPERTENSION: ICD-10-CM

## 2023-05-10 DIAGNOSIS — E78.5 HYPERLIPIDEMIA LDL GOAL <100: ICD-10-CM

## 2023-05-10 DIAGNOSIS — Z51.81 MEDICATION MONITORING ENCOUNTER: ICD-10-CM

## 2023-05-10 DIAGNOSIS — E03.9 ACQUIRED HYPOTHYROIDISM: ICD-10-CM

## 2023-05-10 DIAGNOSIS — R79.9 ABNORMAL BLOOD CHEMISTRY: ICD-10-CM

## 2023-05-10 DIAGNOSIS — M79.10 MYALGIA: ICD-10-CM

## 2023-05-10 DIAGNOSIS — E11.59 TYPE 2 DIABETES MELLITUS WITH OTHER CIRCULATORY COMPLICATION, WITHOUT LONG-TERM CURRENT USE OF INSULIN: Primary | ICD-10-CM

## 2023-05-10 LAB
ALBUMIN SERPL-MCNC: 4.3 G/DL (ref 3.5–5.2)
ALBUMIN/GLOB SERPL: 1.6 G/DL
ALP SERPL-CCNC: 81 U/L (ref 39–117)
ALT SERPL-CCNC: 28 U/L (ref 1–41)
AST SERPL-CCNC: 17 U/L (ref 1–40)
BILIRUB SERPL-MCNC: 0.4 MG/DL (ref 0–1.2)
BUN SERPL-MCNC: 9 MG/DL (ref 6–20)
BUN/CREAT SERPL: 8.3 (ref 7–25)
CALCIUM SERPL-MCNC: 9.4 MG/DL (ref 8.6–10.5)
CHLORIDE SERPL-SCNC: 107 MMOL/L (ref 98–107)
CO2 SERPL-SCNC: 28.8 MMOL/L (ref 22–29)
CREAT SERPL-MCNC: 1.09 MG/DL (ref 0.76–1.27)
EGFRCR SERPLBLD CKD-EPI 2021: 78.7 ML/MIN/1.73
ERYTHROCYTE [DISTWIDTH] IN BLOOD BY AUTOMATED COUNT: 12 % (ref 12.3–15.4)
GLOBULIN SER CALC-MCNC: 2.7 GM/DL
GLUCOSE SERPL-MCNC: 111 MG/DL (ref 65–99)
HBA1C MFR BLD: 5.9 % (ref 4.8–5.6)
HCT VFR BLD AUTO: 44.1 % (ref 37.5–51)
HGB BLD-MCNC: 14.6 G/DL (ref 13–17.7)
MCH RBC QN AUTO: 30.7 PG (ref 26.6–33)
MCHC RBC AUTO-ENTMCNC: 33.1 G/DL (ref 31.5–35.7)
MCV RBC AUTO: 92.6 FL (ref 79–97)
PLATELET # BLD AUTO: 247 10*3/MM3 (ref 140–450)
POTASSIUM SERPL-SCNC: 5.3 MMOL/L (ref 3.5–5.2)
PROT SERPL-MCNC: 7 G/DL (ref 6–8.5)
RBC # BLD AUTO: 4.76 10*6/MM3 (ref 4.14–5.8)
SODIUM SERPL-SCNC: 142 MMOL/L (ref 136–145)
T4 FREE SERPL-MCNC: 0.86 NG/DL (ref 0.93–1.7)
TSH SERPL DL<=0.005 MIU/L-ACNC: 2.47 UIU/ML (ref 0.27–4.2)
WBC # BLD AUTO: 9.71 10*3/MM3 (ref 3.4–10.8)

## 2023-05-10 RX ORDER — ROSUVASTATIN CALCIUM 20 MG/1
20 TABLET, COATED ORAL NIGHTLY
Qty: 90 TABLET | Refills: 3 | Status: SHIPPED | OUTPATIENT
Start: 2023-05-10

## 2023-05-10 NOTE — PROGRESS NOTES
Chief Complaint  Diabetes (6 month follow up) and Hip Pain (Stiffness in left hip causing pain for the last 6 months )  Answers for HPI/ROS submitted by the patient on 5/8/2023  What is the primary reason for your visit?: Diabetes      Subjective        Dom Marin presents to [facility]  History of Present Illness  Widespread muscle aches. Hip/thigh pain in mornings when he bends over to put on boots. Upper back/shoulder pain. Only statin he's taken is simvastatin. On 40 mg.    Quit smoking 2008.   Diabetes  He presents for his follow-up diabetic visit. He has type 1 diabetes mellitus. No MedicAlert identification noted. The initial diagnosis of diabetes was made 4 Years ago. Hypoglycemia symptoms include sleepiness. Pertinent negatives for hypoglycemia include no confusion, dizziness, headaches, hunger, mood changes, nervousness/anxiousness, pallor, seizures, speech difficulty, sweats or tremors. Associated symptoms include fatigue and weakness. Pertinent negatives for diabetes include no blurred vision, no chest pain, no foot paresthesias, no foot ulcerations, no polydipsia, no polyphagia, no polyuria, no visual change and no weight loss. Pertinent negatives for hypoglycemia complications include no blackouts, no hospitalization, no nocturnal hypoglycemia, no required assistance and no required glucagon injection. Symptoms are stable. Pertinent negatives for diabetic complications include no CVA, heart disease, impotence, nephropathy, peripheral neuropathy, PVD or retinopathy. Risk factors for coronary artery disease include dyslipidemia, obesity and tobacco exposure. Current diabetic treatment includes oral agent (monotherapy). He is compliant with treatment all of the time. His weight is fluctuating minimally. He is following a generally healthy diet. When asked about meal planning, he reported none. He has not had a previous visit with a dietitian. He rarely participates in  "exercise. He does not see a podiatrist.Eye exam is not current.   Hypothyroidism  This is a chronic problem. The current episode started more than 1 year ago. Associated symptoms include fatigue and weakness. Pertinent negatives include no chest pain, headaches or visual change. Treatments tried: levothyroxine.       Objective   Vital Signs:  /76 (BP Location: Left arm, Patient Position: Sitting, Cuff Size: Adult)   Pulse 61   Temp 97.3 °F (36.3 °C) (Temporal)   Resp 16   Ht 175.3 cm (69\")   Wt 109 kg (240 lb)   SpO2 98%   BMI 35.44 kg/m²   Estimated body mass index is 35.44 kg/m² as calculated from the following:    Height as of this encounter: 175.3 cm (69\").    Weight as of this encounter: 109 kg (240 lb).             Physical Exam  Vitals and nursing note reviewed.   Constitutional:       General: He is not in acute distress.     Appearance: Normal appearance. He is well-developed and well-groomed. He is not ill-appearing, toxic-appearing or diaphoretic.   HENT:      Head: Normocephalic and atraumatic.      Right Ear: Hearing normal.      Left Ear: Hearing normal.   Eyes:      General: Lids are normal. No scleral icterus.        Right eye: No discharge.         Left eye: No discharge.      Extraocular Movements: Extraocular movements intact.   Cardiovascular:      Rate and Rhythm: Normal rate and regular rhythm.   Pulmonary:      Effort: Pulmonary effort is normal.      Breath sounds: Normal breath sounds.   Musculoskeletal:      Cervical back: Neck supple.   Skin:     Coloration: Skin is not jaundiced or pale.   Neurological:      General: No focal deficit present.      Mental Status: He is alert and oriented to person, place, and time.   Psychiatric:         Attention and Perception: Attention and perception normal.         Mood and Affect: Mood and affect normal.         Speech: Speech normal.         Behavior: Behavior normal. Behavior is cooperative.         Thought Content: Thought content " normal.         Cognition and Memory: Cognition and memory normal.         Judgment: Judgment normal.        Result Review :  The following data was reviewed by: Suma Zurita MD on 05/10/2023:  Lab Results   Component Value Date    HGBA1C 6.00 (H) 11/16/2022    HGBA1C 5.90 (H) 04/11/2022    HGBA1C 5.9 01/11/2022      Lab Results   Component Value Date    TSH 2.140 11/16/2022     Lab Results   Component Value Date    FREET4 0.86 (L) 11/16/2022                   Assessment and Plan   Diagnoses and all orders for this visit:    1. Type 2 diabetes mellitus with other circulatory complication, without long-term current use of insulin (Primary)  -     rosuvastatin (Crestor) 20 MG tablet; Take 1 tablet by mouth Every Night. To lower cholesterol and risk of stroke.  Dispense: 90 tablet; Refill: 3  -     Hemoglobin A1c    2. Acquired hypothyroidism  -     TSH+Free T4    3. Essential hypertension  Assessment & Plan:  Hypertension is improving with treatment.  Weight loss.  Regular aerobic exercise.  Continue current medications.  Blood pressure will be reassessed at the next regular appointment.    Orders:  -     TSH+Free T4    4. Hyperlipidemia LDL goal <100  Comments:  changing statin, suspect simvastatin causing myalgias, labs next visit fasting  Orders:  -     rosuvastatin (Crestor) 20 MG tablet; Take 1 tablet by mouth Every Night. To lower cholesterol and risk of stroke.  Dispense: 90 tablet; Refill: 3    5. Myalgia  Comments:  if lessened by statin change but not eliminated could supplement with co q 10 otc.    6. Personal history of tobacco use, presenting hazards to health  -      CT Chest Low Dose Cancer Screening WO; Future    7. Encounter for screening for lung cancer  Comments:  at 15 years since cessation, this will be his last screening for lung cancer  Orders:  -      CT Chest Low Dose Cancer Screening WO; Future    8. Abnormal blood chemistry  -     TSH+Free T4  -     Hemoglobin A1c  -      Comprehensive Metabolic Panel  -     CBC (No Diff)    9. Medication monitoring encounter  -     TSH+Free T4  -     Hemoglobin A1c  -     Comprehensive Metabolic Panel  -     CBC (No Diff)             Follow Up   Return in about 6 months (around 11/10/2023) for Annual physical.  Patient was given instructions and counseling regarding his condition or for health maintenance advice. Please see specific information pulled into the AVS if appropriate.

## 2023-05-25 ENCOUNTER — HOSPITAL ENCOUNTER (OUTPATIENT)
Dept: CT IMAGING | Facility: HOSPITAL | Age: 58
Discharge: HOME | End: 2023-05-25
Admitting: FAMILY MEDICINE
Payer: OTHER GOVERNMENT

## 2023-05-25 DIAGNOSIS — Z87.891 PERSONAL HISTORY OF TOBACCO USE, PRESENTING HAZARDS TO HEALTH: ICD-10-CM

## 2023-05-25 DIAGNOSIS — Z12.2 ENCOUNTER FOR SCREENING FOR LUNG CANCER: ICD-10-CM

## 2023-05-25 PROCEDURE — 71271 CT THORAX LUNG CANCER SCR C-: CPT

## 2023-11-20 NOTE — PROGRESS NOTES
11/21/2023  Chief Complaint   Patient presents with    Annual Exam    Diabetes       Patient Care Team:  Suma Zurita MD as PCP - General (Family Medicine)  Fatoumata Tracy APRN as Nurse Practitioner (Nurse Practitioner)]       Dom Marin is here for his annual preventive exam. History per MA reviewed.     GI side effects from metformin. Interested in newer therapies that may help with weight loss, has gained 10 lb since last visit    Left hip worsened, now unable to ride his motorcycle as he has decreased range of motion of the joint. Cannot raise leg to tie shoe.     Health Maintenance   Topic Date Due    HEMOGLOBIN A1C  11/10/2023    PROSTATE CANCER SCREENING  11/16/2023    ANNUAL PHYSICAL  11/16/2023    LIPID PANEL  11/16/2023    URINE MICROALBUMIN  11/16/2023    ZOSTER VACCINE (1 of 2) 11/21/2023 (Originally 11/26/2014)    DIABETIC FOOT EXAM  12/06/2023 (Originally 6/8/2022)    DIABETIC EYE EXAM  01/09/2024 (Originally 1/24/2018)    COVID-19 Vaccine (6 - 2023-24 season) 12/12/2112 (Originally 9/1/2023)    BMI FOLLOWUP  11/21/2024    COLORECTAL CANCER SCREENING  01/01/2025    TDAP/TD VACCINES (2 - Td or Tdap) 02/18/2030    HEPATITIS C SCREENING  Completed    Pneumococcal Vaccine 0-64  Completed    INFLUENZA VACCINE  Completed    Hepatitis B  Discontinued       Immunization History   Administered Date(s) Administered    COVID-19 (MODERNA) 1st,2nd,3rd Dose Monovalent 01/28/2021, 03/09/2021, 11/22/2021, 04/13/2022    COVID-19 (PFIZER) BIVALENT 12+YRS 11/16/2022    Fluzone (or Fluarix & Flulaval for VFC) >6mos 09/09/2021, 11/21/2023    Influenza, Unspecified 10/03/2022    Pneumococcal Conjugate 20-Valent (PCV20) 04/13/2022    Pneumococcal Polysaccharide (PPSV23) 06/08/2021    Tdap 02/18/2020    flucelvax quad pfs =>4 YRS 02/18/2020         The following portions of the patient's history were reviewed and updated as appropriate: allergies, current medications, past family history, past medical  "history, past social history, past surgical history and problem list.    Objective   Visit Vitals  /74 (BP Location: Left arm, Patient Position: Sitting, Cuff Size: Adult)   Pulse 76   Temp 97.1 °F (36.2 °C) (Temporal)   Resp 16   Ht 175.3 cm (69\")   Wt 113 kg (250 lb)   SpO2 96%   BMI 36.92 kg/m²        Physical Exam  Vitals and nursing note reviewed.   Constitutional:       General: He is not in acute distress.     Appearance: Normal appearance. He is well-developed and well-groomed. He is obese. He is not ill-appearing, toxic-appearing or diaphoretic.   HENT:      Head: Normocephalic and atraumatic.      Right Ear: Hearing, tympanic membrane, ear canal and external ear normal.      Left Ear: Hearing, tympanic membrane, ear canal and external ear normal.      Nose: Nose normal.      Mouth/Throat:      Mouth: Mucous membranes are moist.   Eyes:      General: Lids are normal. Gaze aligned appropriately. No scleral icterus.        Right eye: No discharge.         Left eye: No discharge.      Extraocular Movements: Extraocular movements intact.      Conjunctiva/sclera: Conjunctivae normal.      Pupils: Pupils are equal, round, and reactive to light.   Neck:      Thyroid: No thyromegaly.      Trachea: Phonation normal.   Cardiovascular:      Rate and Rhythm: Normal rate and regular rhythm.      Heart sounds: Normal heart sounds.   Pulmonary:      Effort: Pulmonary effort is normal.      Breath sounds: Normal breath sounds and air entry.   Abdominal:      General: Bowel sounds are normal. There is no distension.      Palpations: Abdomen is soft.      Tenderness: There is no abdominal tenderness. There is no guarding or rebound.   Musculoskeletal:         General: No deformity or signs of injury.      Cervical back: Neck supple.      Left hip: Decreased range of motion.   Skin:     General: Skin is warm and dry.      Capillary Refill: Capillary refill takes less than 2 seconds.      Coloration: Skin is not " cyanotic, jaundiced or pale.      Findings: Lesion (brown stuck on appearing plaque left breast area, similar smaller areas on back following skin lines) present. No rash.   Neurological:      General: No focal deficit present.      Mental Status: He is alert and oriented to person, place, and time. Mental status is at baseline.      Cranial Nerves: No dysarthria.      Motor: No tremor, abnormal muscle tone or seizure activity.      Gait: Gait is intact.   Psychiatric:         Attention and Perception: Attention and perception normal.         Mood and Affect: Mood and affect normal.         Speech: Speech normal.         Behavior: Behavior normal. Behavior is cooperative.         Thought Content: Thought content normal.         Cognition and Memory: Cognition and memory normal.         Judgment: Judgment normal.         Lab Results   Component Value Date    CHLPL 131 11/16/2022    TRIG 90 11/16/2022    HDL 49 11/16/2022    LDL 65 11/16/2022     Lab Results   Component Value Date    TSH 2.470 05/10/2023     Lab Results   Component Value Date    FREET4 0.86 (L) 05/10/2023     Lab Results   Component Value Date    HGBA1C 5.90 (H) 05/10/2023    HGBA1C 6.00 (H) 11/16/2022    HGBA1C 5.90 (H) 04/11/2022       Assessment   Diagnoses and all orders for this visit:    1. Annual physical exam (Primary)  -     PSA Screen  -     Lipid Panel  -     Hemoglobin A1c  -     Comprehensive Metabolic Panel  -     CBC (No Diff)  -     Microalbumin / Creatinine Urine Ratio - Urine, Clean Catch  -     TSH+Free T4  -     Vitamin D,25-Hydroxy  -     Vitamin B12  -     Testosterone (Free & Total), LC / MS    2. Type 2 diabetes mellitus with other circulatory complication, without long-term current use of insulin  Comments:  GI side effects from metformin, trial of mounjaro  Orders:  -     Cancel: POC Microalbumin  -     Tirzepatide (Mounjaro) 2.5 MG/0.5ML solution pen-injector; Inject 0.5 mL under the skin into the appropriate area as  directed 1 (One) Time Per Week for 4 doses.  Dispense: 2 mL; Refill: 0  -     Tirzepatide (Mounjaro) 5 MG/0.5ML solution pen-injector; Inject 0.5 mL under the skin into the appropriate area as directed 1 (One) Time Per Week.  Dispense: 2 mL; Refill: 1  -     Lipid Panel  -     Hemoglobin A1c  -     Comprehensive Metabolic Panel  -     Microalbumin / Creatinine Urine Ratio - Urine, Clean Catch  -     Vitamin B12    3. Class 2 severe obesity due to excess calories with serious comorbidity and body mass index (BMI) of 36.0 to 36.9 in adult  Comments:  information on healthy weight included on AVS  Orders:  -     Vitamin D,25-Hydroxy  -     Testosterone (Free & Total), LC / MS    4. Decreased range of left hip movement  Comments:  defer imaging to orthopedics  Orders:  -     Ambulatory Referral to Orthopedic Surgery    5. Seborrheic keratoses  Comments:  info via avs, reassurance given    6. Acquired hypothyroidism  -     TSH+Free T4    7. Hyperlipidemia LDL goal <100  -     Lipid Panel  -     Comprehensive Metabolic Panel    8. Essential hypertension  -     Comprehensive Metabolic Panel  -     CBC (No Diff)  -     Microalbumin / Creatinine Urine Ratio - Urine, Clean Catch  -     TSH+Free T4    9. Medication monitoring encounter  -     Lipid Panel  -     Hemoglobin A1c  -     Comprehensive Metabolic Panel  -     CBC (No Diff)  -     Microalbumin / Creatinine Urine Ratio - Urine, Clean Catch  -     TSH+Free T4  -     Vitamin D,25-Hydroxy  -     Vitamin B12  -     Testosterone (Free & Total), LC / MS    10. Abnormal blood chemistry  -     Lipid Panel  -     Hemoglobin A1c  -     Comprehensive Metabolic Panel  -     CBC (No Diff)  -     Microalbumin / Creatinine Urine Ratio - Urine, Clean Catch  -     TSH+Free T4  -     Vitamin D,25-Hydroxy  -     Vitamin B12  -     Testosterone (Free & Total), LC / MS    11. Prostate cancer screening  -     PSA Screen    12. Need for influenza vaccination  -     Fluzone (or Fluarix &  Flulaval for VFC) >6 Mos ([id number])    Other orders  -     ondansetron ODT (ZOFRAN-ODT) 8 MG disintegrating tablet; Place 1 tablet on the tongue Every 8 (Eight) Hours As Needed for Nausea or Vomiting.  Dispense: 30 tablet; Refill: 1         Health maintenance information provided via patient plan (after visit summary).   Counseled on age appropriate health screenings and immunizations.  Encouraged exercise and healthy diet.    Class 2 Severe Obesity (BMI >=35 and <=39.9). Obesity-related health conditions include the following: hypertension, diabetes mellitus, dyslipidemias, and osteoarthritis. Obesity is unchanged. BMI is is above average; BMI management plan is completed. We discussed portion control and pharmacologic options including Mounjaro for diabetes .      No personal history of gastroparesis nor pancreatitis.  Patient has gallbladder but no known problems. Some patients develop issues when using this medicine class. No family history of thyroid cancer, pancreatic cancer nor multiple endocrine neoplasia. Discussed possible side effects of  Mounjaro including but not limited to nausea, acid reflux, constipation.  Stay hydrated. Insurance may or may not cover. Discussed dose titrations. Encouraged MyChart communication for any questions/concerns.  Can send Ozempic if not covered. GI side effects from metformin but can resume if Mounjaro or Ozempic not covered.    Return in about 3 months (around 2/24/2024) for Diabetes follow up, new med follow up.     Suma Zurita MD

## 2023-11-21 ENCOUNTER — OFFICE VISIT (OUTPATIENT)
Dept: INTERNAL MEDICINE | Facility: CLINIC | Age: 58
End: 2023-11-21
Payer: OTHER GOVERNMENT

## 2023-11-21 ENCOUNTER — PATIENT MESSAGE (OUTPATIENT)
Dept: INTERNAL MEDICINE | Facility: CLINIC | Age: 58
End: 2023-11-21

## 2023-11-21 VITALS
HEART RATE: 76 BPM | RESPIRATION RATE: 16 BRPM | WEIGHT: 250 LBS | SYSTOLIC BLOOD PRESSURE: 126 MMHG | TEMPERATURE: 97.1 F | OXYGEN SATURATION: 96 % | BODY MASS INDEX: 37.03 KG/M2 | DIASTOLIC BLOOD PRESSURE: 74 MMHG | HEIGHT: 69 IN

## 2023-11-21 DIAGNOSIS — Z12.5 PROSTATE CANCER SCREENING: ICD-10-CM

## 2023-11-21 DIAGNOSIS — E11.59 TYPE 2 DIABETES MELLITUS WITH OTHER CIRCULATORY COMPLICATION, WITHOUT LONG-TERM CURRENT USE OF INSULIN: ICD-10-CM

## 2023-11-21 DIAGNOSIS — E66.01 CLASS 2 SEVERE OBESITY DUE TO EXCESS CALORIES WITH SERIOUS COMORBIDITY AND BODY MASS INDEX (BMI) OF 36.0 TO 36.9 IN ADULT: ICD-10-CM

## 2023-11-21 DIAGNOSIS — Z51.81 MEDICATION MONITORING ENCOUNTER: ICD-10-CM

## 2023-11-21 DIAGNOSIS — L82.1 SEBORRHEIC KERATOSES: ICD-10-CM

## 2023-11-21 DIAGNOSIS — E03.9 ACQUIRED HYPOTHYROIDISM: ICD-10-CM

## 2023-11-21 DIAGNOSIS — Z23 NEED FOR INFLUENZA VACCINATION: ICD-10-CM

## 2023-11-21 DIAGNOSIS — R79.9 ABNORMAL BLOOD CHEMISTRY: ICD-10-CM

## 2023-11-21 DIAGNOSIS — E78.5 HYPERLIPIDEMIA LDL GOAL <100: ICD-10-CM

## 2023-11-21 DIAGNOSIS — M25.652 DECREASED RANGE OF LEFT HIP MOVEMENT: ICD-10-CM

## 2023-11-21 DIAGNOSIS — Z00.00 ANNUAL PHYSICAL EXAM: Primary | ICD-10-CM

## 2023-11-21 DIAGNOSIS — I10 ESSENTIAL HYPERTENSION: ICD-10-CM

## 2023-11-21 RX ORDER — ONDANSETRON 8 MG/1
8 TABLET, ORALLY DISINTEGRATING ORAL EVERY 8 HOURS PRN
Qty: 30 TABLET | Refills: 1 | Status: SHIPPED | OUTPATIENT
Start: 2023-11-21

## 2023-11-21 RX ORDER — TIRZEPATIDE 2.5 MG/.5ML
2.5 INJECTION, SOLUTION SUBCUTANEOUS WEEKLY
Qty: 2 ML | Refills: 0 | Status: SHIPPED | OUTPATIENT
Start: 2023-11-21 | End: 2023-12-13

## 2023-11-21 RX ORDER — TIRZEPATIDE 5 MG/.5ML
5 INJECTION, SOLUTION SUBCUTANEOUS WEEKLY
Qty: 2 ML | Refills: 1 | Status: SHIPPED | OUTPATIENT
Start: 2023-12-17

## 2023-11-21 NOTE — PATIENT INSTRUCTIONS

## 2023-11-22 NOTE — TELEPHONE ENCOUNTER
From: Dom Marin  To: Suma Zurita  Sent: 11/21/2023 5:09 PM EST  Subject: [facility]     [facility] called to say they need to talk to you for clarification regarding my prescription

## 2023-11-25 DIAGNOSIS — E03.9 ACQUIRED HYPOTHYROIDISM: ICD-10-CM

## 2023-11-26 LAB
25(OH)D3+25(OH)D2 SERPL-MCNC: 190 NG/ML (ref 30–100)
ALBUMIN SERPL-MCNC: 4.5 G/DL (ref 3.5–5.2)
ALBUMIN/CREAT UR: 10 MG/G CREAT (ref 0–29)
ALBUMIN/GLOB SERPL: 1.7 G/DL
ALP SERPL-CCNC: 64 U/L (ref 39–117)
ALT SERPL-CCNC: 26 U/L (ref 1–41)
AST SERPL-CCNC: 21 U/L (ref 1–40)
BILIRUB SERPL-MCNC: 0.4 MG/DL (ref 0–1.2)
BUN SERPL-MCNC: 12 MG/DL (ref 6–20)
BUN/CREAT SERPL: 14.8 (ref 7–25)
CALCIUM SERPL-MCNC: 9 MG/DL (ref 8.6–10.5)
CHLORIDE SERPL-SCNC: 105 MMOL/L (ref 98–107)
CHOLEST SERPL-MCNC: 114 MG/DL (ref 0–200)
CO2 SERPL-SCNC: 26.7 MMOL/L (ref 22–29)
CREAT SERPL-MCNC: 0.81 MG/DL (ref 0.76–1.27)
CREAT UR-MCNC: 124.2 MG/DL
EGFRCR SERPLBLD CKD-EPI 2021: 102.2 ML/MIN/1.73
ERYTHROCYTE [DISTWIDTH] IN BLOOD BY AUTOMATED COUNT: 12.4 % (ref 12.3–15.4)
GLOBULIN SER CALC-MCNC: 2.6 GM/DL
GLUCOSE SERPL-MCNC: 116 MG/DL (ref 65–99)
HBA1C MFR BLD: 6.3 % (ref 4.8–5.6)
HCT VFR BLD AUTO: 46.8 % (ref 37.5–51)
HDLC SERPL-MCNC: 47 MG/DL (ref 40–60)
HGB BLD-MCNC: 15.6 G/DL (ref 13–17.7)
LDLC SERPL CALC-MCNC: 41 MG/DL (ref 0–100)
MCH RBC QN AUTO: 30.6 PG (ref 26.6–33)
MCHC RBC AUTO-ENTMCNC: 33.3 G/DL (ref 31.5–35.7)
MCV RBC AUTO: 91.9 FL (ref 79–97)
MICROALBUMIN UR-MCNC: 11.9 UG/ML
PLATELET # BLD AUTO: 238 10*3/MM3 (ref 140–450)
POTASSIUM SERPL-SCNC: 4.8 MMOL/L (ref 3.5–5.2)
PROT SERPL-MCNC: 7.1 G/DL (ref 6–8.5)
PSA SERPL-MCNC: 0.19 NG/ML (ref 0–4)
RBC # BLD AUTO: 5.09 10*6/MM3 (ref 4.14–5.8)
SODIUM SERPL-SCNC: 141 MMOL/L (ref 136–145)
T4 FREE SERPL-MCNC: 0.95 NG/DL (ref 0.93–1.7)
TESTOST FREE SERPL-MCNC: 10.3 PG/ML (ref 7.2–24)
TESTOST SERPL-MCNC: 299.4 NG/DL (ref 264–916)
TRIGL SERPL-MCNC: 154 MG/DL (ref 0–150)
TSH SERPL DL<=0.005 MIU/L-ACNC: 1.11 UIU/ML (ref 0.27–4.2)
VIT B12 SERPL-MCNC: 583 PG/ML (ref 211–946)
VLDLC SERPL CALC-MCNC: 26 MG/DL (ref 5–40)
WBC # BLD AUTO: 9.46 10*3/MM3 (ref 3.4–10.8)

## 2023-11-27 RX ORDER — LEVOTHYROXINE SODIUM 0.07 MG/1
TABLET ORAL
Qty: 90 TABLET | Refills: 3 | Status: SHIPPED | OUTPATIENT
Start: 2023-11-27

## 2023-11-30 DIAGNOSIS — M25.552 ARTHRALGIA OF LEFT HIP: Primary | ICD-10-CM

## 2023-12-01 ENCOUNTER — OFFICE VISIT (OUTPATIENT)
Dept: ORTHOPEDIC SURGERY | Facility: CLINIC | Age: 58
End: 2023-12-01
Payer: OTHER GOVERNMENT

## 2023-12-01 VITALS — WEIGHT: 250 LBS | HEIGHT: 69 IN | BODY MASS INDEX: 37.03 KG/M2 | TEMPERATURE: 97.6 F

## 2023-12-01 DIAGNOSIS — M16.12 ARTHRITIS OF LEFT HIP: Primary | ICD-10-CM

## 2023-12-01 RX ORDER — MELOXICAM 15 MG/1
15 TABLET ORAL DAILY
Qty: 30 TABLET | Refills: 1 | Status: SHIPPED | OUTPATIENT
Start: 2023-12-01

## 2023-12-01 NOTE — PROGRESS NOTES
Office Note     Name: Dom Marin    : 1965     MRN: [id number]     Chief Complaint  Pain of the Left Hip (States he has been having pain for about a year, no known injury. He has pain in his groin and into his thigh, he has limited range of motion. )    Subjective     History of Present Illness:  Dom Marin is a 58 y.o. male presenting today for left hip pain that radiates into his groin and proximal left quad.  Denies known injury.  Does admit to leg length discrepancy.  Ongoing for 1.5 years.  No previous treatment.  He also left lateral hip pain, but it is minor compared to the groin pain.  Denies N/T.  Does admit to chronic lower back pain.      Review of Systems   Constitutional:  Negative for fever.   HENT:  Negative for dental problem and voice change.    Eyes:  Negative for visual disturbance.   Respiratory:  Negative for shortness of breath.    Cardiovascular:  Negative for chest pain.   Gastrointestinal:  Negative for abdominal pain.   Genitourinary:  Negative for dysuria.   Musculoskeletal:  Positive for arthralgias (left hip, left thigh). Negative for gait problem and joint swelling.   Skin:  Negative for rash.   Neurological:  Negative for speech difficulty.   Hematological:  Does not bruise/bleed easily.   Psychiatric/Behavioral:  Negative for confusion.         Past Medical History:   Diagnosis Date    Colon polyp     Diabetes mellitus     Fracture     Hyperlipidemia     Hypertension     Obesity     Sleep apnea         Past Surgical History:   Procedure Laterality Date    COLONOSCOPY      TONSILLECTOMY         Family History   Problem Relation Age of Onset    Diabetes Mother     Hyperlipidemia Mother     Cancer Father     Hypertension Father        Social History     Socioeconomic History    Marital status:    Tobacco Use    Smoking status: Former     Packs/day: 1.00     Years: 20.00     Additional pack years: 0.00     Total pack years: 20.00     Types: Cigarettes     Start  "date: 10/15/1984     Quit date: 2/25/2008     Years since quitting: 15.7    Smokeless tobacco: Former     Quit date: 2/25/2008   Vaping Use    Vaping Use: Never used   Substance and Sexual Activity    Alcohol use: Yes     Alcohol/week: 5.0 standard drinks of alcohol     Types: 5 Shots of liquor per week     Comment: 5 glasses per week    Drug use: No    Sexual activity: Yes     Partners: Female     Birth control/protection: Post-menopausal         Current Outpatient Medications:     Ascorbic Acid (VITAMIN C) 500 MG capsule, Take  by mouth., Disp: , Rfl:     B Complex Vitamins (VITAMIN B COMPLEX PO), Take  by mouth., Disp: , Rfl:     levothyroxine (SYNTHROID, LEVOTHROID) 75 MCG tablet, TAKE 1 TABLET EVERY MORNING FOR UNDERACTIVE THYROID (DOSE CHANGE), Disp: 90 tablet, Rfl: 3    meloxicam (MOBIC) 15 MG tablet, Take 1 tablet by mouth Daily., Disp: 30 tablet, Rfl: 1    ondansetron ODT (ZOFRAN-ODT) 8 MG disintegrating tablet, Place 1 tablet on the tongue Every 8 (Eight) Hours As Needed for Nausea or Vomiting., Disp: 30 tablet, Rfl: 1    rosuvastatin (Crestor) 20 MG tablet, Take 1 tablet by mouth Every Night. To lower cholesterol and risk of stroke., Disp: 90 tablet, Rfl: 3    Tirzepatide (Mounjaro) 2.5 MG/0.5ML solution pen-injector, Inject 0.5 mL under the skin into the appropriate area as directed 1 (One) Time Per Week for 4 doses., Disp: 2 mL, Rfl: 0    [START ON 12/17/2023] Tirzepatide (Mounjaro) 5 MG/0.5ML solution pen-injector, Inject 0.5 mL under the skin into the appropriate area as directed 1 (One) Time Per Week., Disp: 2 mL, Rfl: 1    No Known Allergies        Objective   Temp 97.6 °F (36.4 °C)   Ht 175.3 cm (69.02\")   Wt 113 kg (250 lb)   BMI 36.90 kg/m²            Physical Exam  Left Hip Exam     Tenderness   The patient is experiencing tenderness in the anterior.    Range of Motion   Abduction:  20   Adduction:  20   Extension:  10   Flexion:  60   External rotation:  20   Internal rotation: 15     Tests "   YVES: positive  Alyson: positive  Fadir:  Positive FADIR test    Other   Erythema: absent  Sensation: normal  Pulse: present           Extremity DVT signs are negative by clinical screen.     Independent Review of Radiographic Studies:    AP and lateral of the left hip, indication to evaluate pain, no prior comparison views or not available, shows no acute fracture or dislocation evident.  There are moderate degenerative changes to the left hip joint noted.    Procedures    Assessment and Plan   Diagnoses and all orders for this visit:    1. Arthritis of left hip (Primary)  -     meloxicam (MOBIC) 15 MG tablet; Take 1 tablet by mouth Daily.  Dispense: 30 tablet; Refill: 1  -     Ambulatory Referral to Physical Therapy Ortho  -     FL Guided Pain Management Large Joint       Discussion of orthopedic goals  Orthopedic activities reviewed and patient expressed appreciation  Regular exercise as tolerated  Risk, benefits, and merits of treatment alternatives reviewed with the patient and questions answered  Patient guided on mobility and conditioning exercises  Ice, heat, and/or modalities as beneficial  Call or notify for any adverse effect from injection therapy  Physical therapy referral given  Take prescribed medications as instructed only as tolerated  Assistive device encouraged for safety and support    Recommendations/Plan:  Exercise, medications, injections, other patient advice, and return appointment as noted.  Referral: Physical and Occupational Therapy referral.  Patient is encouraged to call or return for any issues or concerns.    Patient is scheduled to have a left hip joint fluoroscopy guided injection tentatively for December 8.  Advised taking his meloxicam in the interim only as needed and as tolerated.  Also encouraged ice and heat.  He was given a course of physical therapy for left hip range of motion and strengthening.  Also discussed surgical options although would like to continue with  conservative treatment as long as it is beneficial.  Patient agrees with this plan.  Advised patient to follow-up as needed.   Advised him to call or return office for any concerns in the interim.    Return if symptoms worsen or fail to improve.  Patient agreeable to call or return sooner for any concerns.

## 2023-12-08 ENCOUNTER — HOSPITAL ENCOUNTER (OUTPATIENT)
Dept: GENERAL RADIOLOGY | Facility: HOSPITAL | Age: 58
Discharge: HOME | End: 2023-12-08
Payer: OTHER GOVERNMENT

## 2023-12-08 PROCEDURE — 77002 NEEDLE LOCALIZATION BY XRAY: CPT

## 2023-12-08 PROCEDURE — 25010000002 METHYLPREDNISOLONE PER 80 MG: Performed by: STUDENT IN AN ORGANIZED HEALTH CARE EDUCATION/TRAINING PROGRAM

## 2023-12-08 PROCEDURE — 25010000002 LIDOCAINE 1 % SOLUTION: Performed by: STUDENT IN AN ORGANIZED HEALTH CARE EDUCATION/TRAINING PROGRAM

## 2023-12-08 RX ORDER — LIDOCAINE HYDROCHLORIDE 10 MG/ML
5 INJECTION, SOLUTION INFILTRATION; PERINEURAL ONCE
Status: COMPLETED | OUTPATIENT
Start: 2023-12-08 | End: 2023-12-08

## 2023-12-08 RX ORDER — METHYLPREDNISOLONE ACETATE 80 MG/ML
80 INJECTION, SUSPENSION INTRA-ARTICULAR; INTRALESIONAL; INTRAMUSCULAR; SOFT TISSUE ONCE
Status: COMPLETED | OUTPATIENT
Start: 2023-12-08 | End: 2023-12-08

## 2023-12-08 RX ADMIN — LIDOCAINE HYDROCHLORIDE 5 ML: 10 INJECTION, SOLUTION INFILTRATION; PERINEURAL at 14:15

## 2023-12-08 RX ADMIN — METHYLPREDNISOLONE ACETATE 80 MG: 80 INJECTION, SUSPENSION INTRA-ARTICULAR; INTRALESIONAL; INTRAMUSCULAR; SOFT TISSUE at 14:15

## 2023-12-08 NOTE — POST-PROCEDURE NOTE
[facility]  [address], [address]  [place] [address]  [phone]        PROCEDURE REPORT        DIAGNOSIS:  Left hip osteoarthritis, symptomatic    PROCEDURE: Left hip injection under flouroscopy      Dom Marin with date of birth 1965  presents to [facility] Radiology Department today for injection therapy.        Patient presents to [facility] Radiology Department Flouroscopy Suite on 12/8/2023 for planned elective left hip injection under flouroscopy for symptomatic osteoarthritis.    Procedure:     After consent was obtained, and using ethyl chloride topical local anesthetic, the left hip was then prepped and draped with sterile technique. With an anterior hip approach, flouroscopy guidance, and care to stay lateral of the femoral artery, the hip joint was entered via a 20 gauge spinal needle.  An image was saved of the needle within the hip joint space.  A mixture of 80 mg methylprednisolone in one ml plus 5 ml of 1% plain Lidocaine was injected and the needle withdrawn and a band aid applied. The procedure was well tolerated and without complication.  The patient did remain stable and with baseline ambulation. The patient is asked to avoid stressful physical activity for a day or two before resuming full regular activities.  There might be some soreness initially and patient to call for any adverse effect of the injection treatment.  Call or return to clinic if any fever, swelling, persistent pain, lack of anticipated improvement or other symptoms or concerns.    Impression: Symptomatic left hip osteoarthritis.      Recommendations/Plan:      Treatment and patient advice as noted here and in office visit report.  Orthopedic activities and goals reviewed and patient expressed appreciation.  Call or notify for any adverse effect from injection therapy.    Exercise: As tolerated.  No strenuous activity for a few days as appropriate.  Activity:  May perform usual  activities as tolerated.      Patient will return to our clinic at scheduled appointment.  Patient agreeable to call or return sooner for any concerns.

## 2023-12-13 ENCOUNTER — TELEPHONE (OUTPATIENT)
Dept: INTERNAL MEDICINE | Facility: CLINIC | Age: 58
End: 2023-12-13

## 2023-12-13 NOTE — TELEPHONE ENCOUNTER
Caller: ABIOLA WITH  [id number]    Relationship: Provider    Best call back number: [phone]     What is the medical concern/diagnosis: ARTHRITIS OF LEFT HIP    What specialty or service is being requested: PHYSICAL THERAPY    What is the provider, practice or medical service name: [id number]    What is the office location: [address] [place] [address]    What is the office phone number: [phone]     Any additional details: PLEASE SUBMIT PRE-AUTH FOR THIS . TO BEGIN AS SOON AS POSSIBLE. 12/13/23 IS THE INITIAL EVAL AND DOES NOT KNOW THE NUMBER OF VISITS THAT WILL BE NEEDED YET.

## 2023-12-18 ENCOUNTER — PATIENT MESSAGE (OUTPATIENT)
Dept: INTERNAL MEDICINE | Facility: CLINIC | Age: 58
End: 2023-12-18
Payer: OTHER GOVERNMENT

## 2023-12-19 ENCOUNTER — PRIOR AUTHORIZATION (OUTPATIENT)
Dept: INTERNAL MEDICINE | Facility: CLINIC | Age: 58
End: 2023-12-19
Payer: OTHER GOVERNMENT

## 2023-12-19 NOTE — TELEPHONE ENCOUNTER
TRULICITY is preferred medication.     EULALIO MONTGOMERY (Key: [id number])  PA Case ID #: [id number]  Need Help? Call us at [phone]  Status  Additional Information Required  Drug  Mounjaro 2.5MG/0.5ML pen-injectors  ePA cloud logo  Form  [employer] Electronic PA Form (2017 NCPDP)

## 2023-12-19 NOTE — TELEPHONE ENCOUNTER
EULALIO MONTGOMERY (Key: [id number])  PA Case ID #: [id number]  Need Help? Call us at [phone]  Outcome  Approved today  CaseId:[id number];Status:Approved;Review Type:Prior Auth;Coverage Start Date:11/19/2023;Coverage End Date:12/31/2099;  Authorization Expiration Date: 12/30/2099  Drug  Mounjaro 2.5MG/0.5ML pen-injectors  ePA cloud logo  Form  [employer] Electronic PA Form (2017 NCPDP)

## 2024-02-23 ENCOUNTER — OFFICE VISIT (OUTPATIENT)
Dept: INTERNAL MEDICINE | Facility: CLINIC | Age: 59
End: 2024-02-23
Payer: OTHER GOVERNMENT

## 2024-02-23 VITALS
OXYGEN SATURATION: 97 % | SYSTOLIC BLOOD PRESSURE: 138 MMHG | HEART RATE: 83 BPM | TEMPERATURE: 97.1 F | HEIGHT: 69 IN | RESPIRATION RATE: 16 BRPM | DIASTOLIC BLOOD PRESSURE: 82 MMHG | BODY MASS INDEX: 35.34 KG/M2 | WEIGHT: 238.6 LBS

## 2024-02-23 DIAGNOSIS — E11.59 TYPE 2 DIABETES MELLITUS WITH OTHER CIRCULATORY COMPLICATION, WITHOUT LONG-TERM CURRENT USE OF INSULIN: Primary | ICD-10-CM

## 2024-02-23 DIAGNOSIS — E03.9 ACQUIRED HYPOTHYROIDISM: ICD-10-CM

## 2024-02-23 DIAGNOSIS — E66.01 CLASS 2 SEVERE OBESITY DUE TO EXCESS CALORIES WITH SERIOUS COMORBIDITY AND BODY MASS INDEX (BMI) OF 35.0 TO 35.9 IN ADULT: ICD-10-CM

## 2024-02-23 DIAGNOSIS — M16.12 ARTHRITIS OF LEFT HIP: ICD-10-CM

## 2024-02-23 LAB
EXPIRATION DATE: NORMAL
HBA1C MFR BLD: 5.7 % (ref 4.5–5.7)
Lab: NORMAL

## 2024-02-23 RX ORDER — MELOXICAM 15 MG/1
15 TABLET ORAL DAILY
Qty: 90 TABLET | Refills: 1 | Status: SHIPPED | OUTPATIENT
Start: 2024-02-23

## 2024-02-23 RX ORDER — LEVOTHYROXINE SODIUM 0.07 MG/1
75 TABLET ORAL
Qty: 28 TABLET | Refills: 0 | COMMUNITY
Start: 2024-02-23 | End: 2024-03-22

## 2024-02-23 NOTE — PROGRESS NOTES
"Chief Complaint  Diabetes (3 month follow up)    Subjective        Dom Marin presents to [facility] PRIMARY [facility]ARE  History of Present Illness  Mounjaro 5 mg--admits he lost most of his weight on first dose first weeks. Weight loss stalled now. Some constipation. Limited on exercise due to hip, has had steroid shot from orthopedics that helped maybe 3 weeks. Next step may be replacement. He puts turmeric in coffee.   Diabetes  He presents for his follow-up diabetic visit. He has type 2 diabetes mellitus. No MedicAlert identification noted. The initial diagnosis of diabetes was made 5 years ago. Pertinent negatives for hypoglycemia include no confusion, headaches, speech difficulty, sweats or tremors. Associated symptoms include polyuria and weight loss. Pertinent negatives for diabetes include no blurred vision, no foot paresthesias, no foot ulcerations and no polydipsia. Pertinent negatives for hypoglycemia complications include no blackouts, no hospitalization, no nocturnal hypoglycemia, no required assistance and no required glucagon injection. Symptoms are stable. He is compliant with treatment most of the time. He is following a low fat/cholesterol diet. When asked about meal planning, he reported none. He participates in exercise weekly. He does not see a podiatrist. Eye exam is not current.       Objective   Vital Signs:  /82 (BP Location: Left arm, Patient Position: Sitting, Cuff Size: Adult)   Pulse 83   Temp 97.1 °F (36.2 °C) (Temporal)   Resp 16   Ht 175.3 cm (69\")   Wt 108 kg (238 lb 9.6 oz)   SpO2 97%   BMI 35.24 kg/m²   Estimated body mass index is 35.24 kg/m² as calculated from the following:    Height as of this encounter: 175.3 cm (69\").    Weight as of this encounter: 108 kg (238 lb 9.6 oz).         Class 2 Severe Obesity (BMI >=35 and <=39.9). Obesity-related health conditions include the following: diabetes mellitus and osteoarthritis. Obesity is improving with " treatment. BMI is is above average; BMI management plan is completed. We discussed portion control, increasing exercise, and pharmacologic options including Mounjaro for diabetes .       Physical Exam  Vitals and nursing note reviewed.   Constitutional:       General: He is not in acute distress.     Appearance: Normal appearance. He is well-developed and well-groomed. He is obese. He is not ill-appearing, toxic-appearing or diaphoretic.   HENT:      Head: Normocephalic and atraumatic.      Right Ear: Hearing normal.      Left Ear: Hearing normal.   Eyes:      General: Lids are normal. No scleral icterus.        Right eye: No discharge.         Left eye: No discharge.      Extraocular Movements: Extraocular movements intact.   Cardiovascular:      Rate and Rhythm: Normal rate and regular rhythm.   Pulmonary:      Effort: Pulmonary effort is normal.      Breath sounds: Normal breath sounds.   Musculoskeletal:      Cervical back: Neck supple.   Skin:     Coloration: Skin is not jaundiced or pale.   Neurological:      General: No focal deficit present.      Mental Status: He is alert and oriented to person, place, and time.      Gait: Gait abnormal.   Psychiatric:         Attention and Perception: Attention and perception normal.         Mood and Affect: Mood and affect normal.         Speech: Speech normal.         Behavior: Behavior normal. Behavior is cooperative.         Thought Content: Thought content normal.         Cognition and Memory: Cognition and memory normal.         Judgment: Judgment normal.        Result Review :    The following data was reviewed by: Suma Zurita MD on 02/23/2024:  Lab Results   Component Value Date    HGBA1C 5.7 02/23/2024    HGBA1C 6.30 (H) 11/21/2023    HGBA1C 5.90 (H) 05/10/2023      Lab Results   Component Value Date    TSH 1.110 11/21/2023     Lab Results   Component Value Date    FREET4 0.95 11/21/2023                   Assessment and Plan     Diagnoses and all orders  for this visit:    1. Type 2 diabetes mellitus with other circulatory complication, without long-term current use of insulin (Primary)  -     POC Glycosylated Hemoglobin (Hb A1C)  -     Tirzepatide (Mounjaro) 7.5 MG/0.5ML solution pen-injector pen; Inject 0.5 mL under the skin into the appropriate area as directed 1 (One) Time Per Week.  Dispense: 2 mL; Refill: 1    2. Class 2 severe obesity due to excess calories with serious comorbidity and body mass index (BMI) of 35.0 to 35.9 in adult  Comments:  down 12 lb since last visit    3. Acquired hypothyroidism  Comments:  per mail order can't get his rx, he has about 30 days left, I gave him samples in case needed  Assessment & Plan:  Last TSH therapeutic. Continue levothyroxine. Will need to check TSH at least once a year on labs.    Orders:  -     levothyroxine (Synthroid) 75 MCG tablet; Take 1 tablet by mouth Every Morning for 28 days.  Dispense: 28 tablet; Refill: 0    4. Arthritis of left hip  Comments:  follow up with orthopedics. discussed turmeric, co-q10, fish oil (burpless) as possible otc supplements that MAY help  Orders:  -     meloxicam (MOBIC) 15 MG tablet; Take 1 tablet by mouth Daily. As needed for joint pain/inflammation  Dispense: 90 tablet; Refill: 1    I increased Mounjaro from 5 mg to 7.5 mg. Try to move as much as possible (though limited by osteoarthritis of hip), increase water and fiber intake and consider over the counter stool softeners, constipation may worsen with dose increase. Encouraged MyChart communication for refill, let us know if he wants to stay on 7.5 mg or go back down to 5 mg or go up to 10 mg (each dose stay on at least 4 weeks). He voiced understanding.          Follow Up     Return in about 4 months (around 6/23/2024) for Diabetes follow up.  Patient was given instructions and counseling regarding his condition or for health maintenance advice. Please see specific information pulled into the AVS if appropriate.

## 2024-02-23 NOTE — ASSESSMENT & PLAN NOTE
Last TSH therapeutic. Continue levothyroxine. Will need to check TSH at least once a year on labs.

## 2024-04-12 ENCOUNTER — PATIENT MESSAGE (OUTPATIENT)
Dept: INTERNAL MEDICINE | Facility: CLINIC | Age: 59
End: 2024-04-12
Payer: OTHER GOVERNMENT

## 2024-04-19 ENCOUNTER — TELEPHONE (OUTPATIENT)
Dept: ORTHOPEDIC SURGERY | Facility: CLINIC | Age: 59
End: 2024-04-19
Payer: OTHER GOVERNMENT

## 2024-04-19 NOTE — TELEPHONE ENCOUNTER
Left message on patient's voicemail letting him know I will call him when May's hip fluoro injection date is available.

## 2024-04-19 NOTE — TELEPHONE ENCOUNTER
Caller: EULALIO    Relationship to patient: SELF    Best call back number: [phone]    Chief complaint: L HIP PAIN    Type of visit: GUIDED HIP INJ (PREVIOUSLY HAD ONE 12/08/23)      Additional notes: PT WAS SATISFIED WITH PREVIOUS INJ ON 12/08/23 AND WOULD LIKE TO HAVE ANOTHER.

## 2024-04-22 DIAGNOSIS — M16.12 ARTHRITIS OF LEFT HIP: Primary | ICD-10-CM

## 2024-04-22 DIAGNOSIS — M25.552 ARTHRALGIA OF LEFT HIP: ICD-10-CM

## 2024-04-28 DIAGNOSIS — E78.5 HYPERLIPIDEMIA LDL GOAL <100: ICD-10-CM

## 2024-04-28 DIAGNOSIS — E11.59 TYPE 2 DIABETES MELLITUS WITH OTHER CIRCULATORY COMPLICATION, WITHOUT LONG-TERM CURRENT USE OF INSULIN: ICD-10-CM

## 2024-04-29 RX ORDER — ROSUVASTATIN CALCIUM 20 MG/1
TABLET, COATED ORAL
Qty: 90 TABLET | Refills: 3 | Status: SHIPPED | OUTPATIENT
Start: 2024-04-29

## 2024-05-06 ENCOUNTER — PATIENT MESSAGE (OUTPATIENT)
Dept: INTERNAL MEDICINE | Facility: CLINIC | Age: 59
End: 2024-05-06
Payer: OTHER GOVERNMENT

## 2024-05-24 ENCOUNTER — HOSPITAL ENCOUNTER (OUTPATIENT)
Dept: GENERAL RADIOLOGY | Facility: HOSPITAL | Age: 59
Discharge: HOME | End: 2024-05-24
Payer: OTHER GOVERNMENT

## 2024-05-24 PROCEDURE — 77002 NEEDLE LOCALIZATION BY XRAY: CPT

## 2024-05-24 PROCEDURE — 25010000002 METHYLPREDNISOLONE PER 80 MG: Performed by: STUDENT IN AN ORGANIZED HEALTH CARE EDUCATION/TRAINING PROGRAM

## 2024-05-24 PROCEDURE — 25010000002 LIDOCAINE 1 % SOLUTION: Performed by: STUDENT IN AN ORGANIZED HEALTH CARE EDUCATION/TRAINING PROGRAM

## 2024-05-24 RX ORDER — LIDOCAINE HYDROCHLORIDE 10 MG/ML
5 INJECTION, SOLUTION INFILTRATION; PERINEURAL ONCE
Status: COMPLETED | OUTPATIENT
Start: 2024-05-24 | End: 2024-05-24

## 2024-05-24 RX ORDER — METHYLPREDNISOLONE ACETATE 80 MG/ML
80 INJECTION, SUSPENSION INTRA-ARTICULAR; INTRALESIONAL; INTRAMUSCULAR; SOFT TISSUE ONCE
Status: COMPLETED | OUTPATIENT
Start: 2024-05-24 | End: 2024-05-24

## 2024-05-24 RX ADMIN — METHYLPREDNISOLONE ACETATE 80 MG: 80 INJECTION, SUSPENSION INTRA-ARTICULAR; INTRALESIONAL; INTRAMUSCULAR; SOFT TISSUE at 15:59

## 2024-05-24 RX ADMIN — LIDOCAINE HYDROCHLORIDE 5 ML: 10 INJECTION, SOLUTION INFILTRATION; PERINEURAL at 15:57

## 2024-05-24 NOTE — POST-PROCEDURE NOTE
[facility]  [address], [address]  [place] [address]  [phone]        PROCEDURE REPORT        DIAGNOSIS:  Left hip osteoarthritis, symptomatic    PROCEDURE: Left hip injection under flouroscopy      Dom Marin with date of birth 1965  presents to [facility] Radiology Department today for injection therapy.        Patient presents to [facility] Radiology Department Flouroscopy Suite on 5/24/2024 for planned elective left hip injection under flouroscopy for symptomatic osteoarthritis.    Procedure:     After consent was obtained, and using ethyl chloride topical local anesthetic, the left hip was then prepped and draped with sterile technique. With an anterior hip approach, flouroscopy guidance, and care to stay lateral of the femoral artery, the hip joint was entered via a 20 gauge spinal needle.  An image was saved of the needle within the hip joint space.  A mixture of 80 mg methylprednisolone in one ml plus 5 ml of 1% plain Lidocaine was injected and the needle withdrawn and a band aid applied. The procedure was well tolerated and without complication.  The patient did remain stable and with baseline ambulation. The patient is asked to avoid stressful physical activity for a day or two before resuming full regular activities.  There might be some soreness initially and patient to call for any adverse effect of the injection treatment.  Call or return to clinic if any fever, swelling, persistent pain, lack of anticipated improvement or other symptoms or concerns.    Impression: Symptomatic left hip osteoarthritis.      Recommendations/Plan:      Treatment and patient advice as noted here and in office visit report.  Orthopedic activities and goals reviewed and patient expressed appreciation.  Call or notify for any adverse effect from injection therapy.    Exercise: As tolerated.  No strenuous activity for a few days as appropriate.  Activity:  May perform usual  activities as tolerated.      Patient will return to our clinic at scheduled appointment.  Patient agreeable to call or return sooner for any concerns.

## 2024-06-04 ENCOUNTER — OFFICE VISIT (OUTPATIENT)
Dept: INTERNAL MEDICINE | Facility: CLINIC | Age: 59
End: 2024-06-04
Payer: OTHER GOVERNMENT

## 2024-06-04 VITALS
WEIGHT: 220.4 LBS | HEART RATE: 73 BPM | OXYGEN SATURATION: 97 % | SYSTOLIC BLOOD PRESSURE: 124 MMHG | DIASTOLIC BLOOD PRESSURE: 76 MMHG | TEMPERATURE: 97 F | BODY MASS INDEX: 32.64 KG/M2 | HEIGHT: 69 IN | RESPIRATION RATE: 16 BRPM

## 2024-06-04 DIAGNOSIS — I10 ESSENTIAL HYPERTENSION: ICD-10-CM

## 2024-06-04 DIAGNOSIS — E66.09 CLASS 1 OBESITY DUE TO EXCESS CALORIES WITH SERIOUS COMORBIDITY AND BODY MASS INDEX (BMI) OF 32.0 TO 32.9 IN ADULT: ICD-10-CM

## 2024-06-04 DIAGNOSIS — E11.59 TYPE 2 DIABETES MELLITUS WITH OTHER CIRCULATORY COMPLICATION, WITHOUT LONG-TERM CURRENT USE OF INSULIN: Primary | ICD-10-CM

## 2024-06-04 LAB
EXPIRATION DATE: NORMAL
HBA1C MFR BLD: 5.6 % (ref 4.5–5.7)
Lab: NORMAL

## 2024-06-04 PROCEDURE — 99214 OFFICE O/P EST MOD 30 MIN: CPT | Performed by: FAMILY MEDICINE

## 2024-06-04 PROCEDURE — 83036 HEMOGLOBIN GLYCOSYLATED A1C: CPT | Performed by: FAMILY MEDICINE

## 2024-06-04 RX ORDER — ORAL SEMAGLUTIDE 3 MG/1
3 TABLET ORAL
Qty: 30 TABLET | Refills: 0 | COMMUNITY
Start: 2024-06-04

## 2024-06-04 RX ORDER — ORAL SEMAGLUTIDE 7 MG/1
7 TABLET ORAL
Qty: 90 TABLET | Refills: 1 | Status: SHIPPED | OUTPATIENT
Start: 2024-06-25

## 2024-06-04 NOTE — PROGRESS NOTES
"Chief Complaint  Diabetes (4 month follow up, has not been able to get mounjaro in 3 weeks )  Answers submitted by the patient for this visit:  Primary Reason for Visit (Submitted on 5/28/2024)  What is the primary reason for your visit?: Diabetes    Subjective        Dom Marin presents to [facility] PRIMARY CARE  History of Present Illness  Hasn't been able to get Mounjaro and has been off x 3 weeks  Constipation well managed stool softener  No nausea  Does not want to resume metformin  Diabetes  He presents for his follow-up diabetic visit. He has type 2 diabetes mellitus. No MedicAlert identification noted. The initial diagnosis of diabetes was made 8 years ago. Pertinent negatives for hypoglycemia include no confusion, headaches, speech difficulty, sweats or tremors. Associated symptoms include weight loss. Pertinent negatives for diabetes include no blurred vision, no foot paresthesias, no foot ulcerations, no polydipsia and no polyuria. Pertinent negatives for hypoglycemia complications include no blackouts, no hospitalization, no nocturnal hypoglycemia, no required assistance and no required glucagon injection. Symptoms are improving. He is compliant with treatment all of the time. He is following a generally healthy diet. Meal planning includes calorie counting and avoidance of concentrated sweets. He rarely participates in exercise. He does not see a podiatrist. Eye exam is not current.       Objective   Vital Signs:  /76 (BP Location: Left arm, Patient Position: Sitting, Cuff Size: Adult)   Pulse 73   Temp 97 °F (36.1 °C) (Temporal)   Resp 16   Ht 175.3 cm (69\")   Wt 100 kg (220 lb 6.4 oz)   SpO2 97%   BMI 32.55 kg/m²   Estimated body mass index is 32.55 kg/m² as calculated from the following:    Height as of this encounter: 175.3 cm (69\").    Weight as of this encounter: 100 kg (220 lb 6.4 oz).         BMI is >= 30 and <35. (Class 1 Obesity). The following options were " offered after discussion;: pharmacological intervention options       Physical Exam  Vitals and nursing note reviewed.   Constitutional:       General: He is not in acute distress.     Appearance: Normal appearance. He is well-developed and well-groomed. He is obese. He is not ill-appearing, toxic-appearing or diaphoretic.   HENT:      Head: Normocephalic and atraumatic.      Right Ear: Hearing normal.      Left Ear: Hearing normal.   Eyes:      General: Lids are normal. No scleral icterus.        Right eye: No discharge.         Left eye: No discharge.      Extraocular Movements: Extraocular movements intact.   Cardiovascular:      Rate and Rhythm: Normal rate and regular rhythm.   Pulmonary:      Effort: Pulmonary effort is normal.   Musculoskeletal:      Cervical back: Neck supple.   Skin:     Coloration: Skin is not jaundiced or pale.   Neurological:      General: No focal deficit present.      Mental Status: He is alert and oriented to person, place, and time.   Psychiatric:         Attention and Perception: Attention and perception normal.         Mood and Affect: Mood and affect normal.         Speech: Speech normal.         Behavior: Behavior normal. Behavior is cooperative.         Thought Content: Thought content normal.         Cognition and Memory: Cognition and memory normal.         Judgment: Judgment normal.        Result Review :    The following data was reviewed by: Suma Zurita MD on 06/04/2024:  Lab Results   Component Value Date    HGBA1C 5.6 06/04/2024    HGBA1C 5.7 02/23/2024    HGBA1C 6.30 (H) 11/21/2023               Assessment and Plan     Diagnoses and all orders for this visit:    1. Type 2 diabetes mellitus with other circulatory complication, without long-term current use of insulin (Primary)  -     POC Glycosylated Hemoglobin (Hb A1C)  -     Semaglutide (Rybelsus) 3 MG tablet; Take 1 tablet by mouth Every Morning Before Breakfast. Take on empty stomach with 4 oz of water, wait  30 min before eating/drinking/other meds  Dispense: 30 tablet; Refill: 0  -     Semaglutide (Rybelsus) 7 MG tablet; Take 7 mg by mouth Every Morning Before Breakfast. Take on empty stomach with 4 oz of water, wait 30 min before eating/drinking/other meds  Dispense: 90 tablet; Refill: 1    2. Class 1 obesity due to excess calories with serious comorbidity and body mass index (BMI) of 32.0 to 32.9 in adult  Comments:  weight coming down with medicines, lifestyle changes, was 250 lb last November    3. Essential hypertension  Comments:  bp looks great today, weight loss has helped greatly      Discussed how to take Rybelsus, importance for absorption of medicine  Try to eat more plant-based diet when possible         Follow Up     Return in about 3 months (around 9/7/2024) for Diabetes follow up, A1C in office.  Patient was given instructions and counseling regarding his condition or for health maintenance advice. Please see specific information pulled into the AVS if appropriate.

## 2024-06-11 ENCOUNTER — PATIENT MESSAGE (OUTPATIENT)
Dept: INTERNAL MEDICINE | Facility: CLINIC | Age: 59
End: 2024-06-11
Payer: OTHER GOVERNMENT

## 2024-06-12 NOTE — TELEPHONE ENCOUNTER
From: Dom Marin  To: Suma Zurita  Sent: 6/11/2024 4:29 AM EDT  Subject: Rybelsus    [facility] says prescription in Drug interaction Review. They need to talk with my Doctor to help figure out how it will work safely iny case. They claim they reached out but haven't heard from you yet. Please contact them regarding my Rybelsus prescription    Please review and sign off for incident to

## 2024-06-12 NOTE — TELEPHONE ENCOUNTER
I called [facility] [phone] to let them know the patient was given Rybelsus 3 mg tablet sample and this is to be filled at the end of sample use. Hu states it is scheduled to ship 6/21/2024.

## 2024-08-15 ENCOUNTER — OFFICE VISIT (OUTPATIENT)
Dept: ORTHOPEDIC SURGERY | Facility: CLINIC | Age: 59
End: 2024-08-15
Payer: OTHER GOVERNMENT

## 2024-08-15 VITALS
BODY MASS INDEX: 33.33 KG/M2 | HEIGHT: 69 IN | SYSTOLIC BLOOD PRESSURE: 162 MMHG | WEIGHT: 225 LBS | DIASTOLIC BLOOD PRESSURE: 90 MMHG

## 2024-08-15 DIAGNOSIS — M70.62 TROCHANTERIC BURSITIS OF LEFT HIP: Primary | ICD-10-CM

## 2024-08-15 DIAGNOSIS — M16.12 ARTHRITIS OF LEFT HIP: ICD-10-CM

## 2024-08-15 RX ORDER — METHYLPREDNISOLONE ACETATE 40 MG/ML
40 INJECTION, SUSPENSION INTRA-ARTICULAR; INTRALESIONAL; INTRAMUSCULAR; SOFT TISSUE
Status: COMPLETED | OUTPATIENT
Start: 2024-08-15 | End: 2024-08-15

## 2024-08-15 RX ORDER — LIDOCAINE HYDROCHLORIDE 20 MG/ML
2 INJECTION, SOLUTION INFILTRATION; PERINEURAL
Status: COMPLETED | OUTPATIENT
Start: 2024-08-15 | End: 2024-08-15

## 2024-08-15 RX ADMIN — LIDOCAINE HYDROCHLORIDE 2 ML: 20 INJECTION, SOLUTION INFILTRATION; PERINEURAL at 14:06

## 2024-08-15 RX ADMIN — METHYLPREDNISOLONE ACETATE 40 MG: 40 INJECTION, SUSPENSION INTRA-ARTICULAR; INTRALESIONAL; INTRAMUSCULAR; SOFT TISSUE at 14:06

## 2024-08-15 NOTE — PROGRESS NOTES
Office Note     Name: Dom Marin    : 1965     MRN: [id number]     Chief Complaint  Follow-up of the Left Hip (States he has had two injections which only lasted two to three weeks, he is still having a lot of pain which is getting worse.)    Subjective     History of Present Illness:  Dom Marin is a 58 y.o. male presenting today for follow-up for chronic left hip pain.  In the past patient has received 2 sets of intra-articular hip injections both of which he states lasted a few weeks and then wore off.  Today he continues to have anterior hip pain that radiates into the groin although his chief complaint is lateral left hip pain that radiates down the lateral aspect of his left thigh.  He denies any paresthesias.  He does note stiffness of his left leg.  In the past he has tried intra-articular hip injections, NSAID therapy as well as a few weeks of physical therapy.  Patient states that he discontinued the physical therapy after a few weeks as it was not helping.  He does continue some exercises and stretches at home which she states helped for only a short period of time.  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    Review of Systems   Constitutional:  Negative for fever.   HENT:  Negative for dental problem and voice change.    Eyes:  Negative for visual disturbance.   Respiratory:  Negative for shortness of breath.    Cardiovascular:  Negative for chest pain.   Gastrointestinal:  Negative for abdominal pain.   Genitourinary:  Negative for dysuria.   Musculoskeletal:  Positive for arthralgias (left hip). Negative for gait problem and joint swelling.   Skin:  Negative for rash.   Neurological:  Negative for speech difficulty.   Hematological:  Does not bruise/bleed easily.   Psychiatric/Behavioral:  Negative for confusion.         Past Medical History:   Diagnosis Date   • Arthritis May, 2022    Left hip   • Colon polyp    • Diabetes mellitus   "  • Fracture    • Hyperlipidemia    • Hypertension    • Hypothyroidism     L-thyroxine   • Obesity    • Sleep apnea         Past Surgical History:   Procedure Laterality Date   • COLONOSCOPY     • TONSILLECTOMY         Family History   Problem Relation Age of Onset   • Diabetes Mother    • Hyperlipidemia Mother    • Cancer Father    • Hypertension Father        Social History     Socioeconomic History   • Marital status:    Tobacco Use   • Smoking status: Former     Current packs/day: 0.00     Average packs/day: 1.1 packs/day for 31.2 years (35.1 ttl pk-yrs)     Types: Cigarettes     Start date: 10/15/1984     Quit date: 2008     Years since quittin.4   • Smokeless tobacco: Former     Quit date: 2008   Vaping Use   • Vaping status: Never Used   Substance and Sexual Activity   • Alcohol use: Yes     Alcohol/week: 5.0 standard drinks of alcohol     Types: 5 Shots of liquor per week     Comment: 5 glasses per week   • Drug use: No   • Sexual activity: Yes     Partners: Female     Birth control/protection: Post-menopausal         Current Outpatient Medications:   •  Ascorbic Acid (VITAMIN C) 500 MG capsule, Take  by mouth., Disp: , Rfl:   •  B Complex Vitamins (VITAMIN B COMPLEX PO), Take  by mouth., Disp: , Rfl:   •  levothyroxine (Synthroid) 75 MCG tablet, Take 1 tablet by mouth Every Morning for 28 days., Disp: 28 tablet, Rfl: 0  •  rosuvastatin (CRESTOR) 20 MG tablet, TAKE 1 TABLET EVERY NIGHT TO LOWER CHOLESTEROL AND RISK OF STROKE, Disp: 90 tablet, Rfl: 3  •  Semaglutide (Rybelsus) 7 MG tablet, Take 7 mg by mouth Every Morning Before Breakfast. Take on empty stomach with 4 oz of water, wait 30 min before eating/drinking/other meds, Disp: 90 tablet, Rfl: 1    No Known Allergies        Objective   /90   Ht 175.3 cm (69.02\")   Wt 102 kg (225 lb)   BMI 33.21 kg/m²            Physical Exam  Left Hip Exam     Tenderness   The patient is experiencing tenderness in the greater " trochanter.    Range of Motion   Abduction:  30   Adduction:  20   Extension:  0   Flexion:  120   External rotation:  60   Internal rotation: 20     Muscle Strength   Abduction: 4/5   Adduction: 4/5   Flexion: 5/5     Tests   YVES: positive  Alyson: positive  Fadir:  Negative FADIR test    Other   Erythema: absent  Sensation: normal  Pulse: present         Extremity DVT signs are negative by clinical screen.     Independent Review of Radiographic Studies:    Reviewed relevant prior imaging with patient again today.    Large Joint Arthrocentesis: L greater trochanteric bursa  Date/Time: 8/15/2024 2:06 PM  Consent given by: patient  Site marked: site marked  Timeout: Immediately prior to procedure a time out was called to verify the correct patient, procedure, equipment, support staff and site/side marked as required   Supporting Documentation  Indications: pain   Procedure Details  Location: hip - L greater trochanteric bursa  Preparation: Patient was prepped and draped in the usual sterile fashion  Needle size: 22 G  Medications administered: 40 mg methylPREDNISolone acetate 40 MG/ML; 2 mL lidocaine 2%  Patient tolerance: patient tolerated the procedure well with no immediate complications      Assessment and Plan   Diagnoses and all orders for this visit:    1. Arthritis of left hip (Primary)    2. Trochanteric bursitis of left hip       Discussion of orthopedic goals  Orthopedic activities reviewed and patient expressed appreciation  Regular exercise as tolerated  Risk, benefits, and merits of treatment alternatives reviewed with the patient and questions answered  Patient guided on mobility and conditioning exercises  Ice, heat, and/or modalities as beneficial  Call or notify for any adverse effect from injection therapy  Reduced physical activity as appropriate and avoid offending activity  Counseling on diet, nutrition, fitness exercise, and weight reduction goals    Recommendations/Plan:  Exercise,  medications, injections, other patient advice, and return appointment as noted.  Patient is encouraged to call or return for any issues or concerns.    Patient was given a cortisone injection over the left greater trochanteric bursa for symptomatic relief.  Also discussed stretches for the IT band to help alleviate stiffness and pain.  I advised heat to the area, gentle massage and TENS unit.  Advise follow-up with me in 5-6 weeks if symptoms fail to improve.  Patient was counseled that this injection probably would not help his hip OA.    Return in about 6 weeks (around 9/26/2024) for Recheck.  Patient agreeable to call or return sooner for any concerns.

## 2024-08-24 DIAGNOSIS — M16.12 ARTHRITIS OF LEFT HIP: ICD-10-CM

## 2024-08-26 RX ORDER — MELOXICAM 15 MG/1
TABLET ORAL
Qty: 90 TABLET | Refills: 3 | OUTPATIENT
Start: 2024-08-26

## 2024-09-06 ENCOUNTER — OFFICE VISIT (OUTPATIENT)
Dept: INTERNAL MEDICINE | Facility: CLINIC | Age: 59
End: 2024-09-06
Payer: OTHER GOVERNMENT

## 2024-09-06 VITALS
BODY MASS INDEX: 33.44 KG/M2 | OXYGEN SATURATION: 96 % | WEIGHT: 225.8 LBS | HEIGHT: 69 IN | TEMPERATURE: 97.2 F | SYSTOLIC BLOOD PRESSURE: 120 MMHG | DIASTOLIC BLOOD PRESSURE: 76 MMHG | HEART RATE: 66 BPM

## 2024-09-06 DIAGNOSIS — M54.12 CERVICAL RADICULOPATHY: ICD-10-CM

## 2024-09-06 DIAGNOSIS — E78.5 HYPERLIPIDEMIA LDL GOAL <130: ICD-10-CM

## 2024-09-06 DIAGNOSIS — E66.09 CLASS 1 OBESITY DUE TO EXCESS CALORIES WITH SERIOUS COMORBIDITY AND BODY MASS INDEX (BMI) OF 33.0 TO 33.9 IN ADULT: ICD-10-CM

## 2024-09-06 DIAGNOSIS — E11.59 TYPE 2 DIABETES MELLITUS WITH OTHER CIRCULATORY COMPLICATION, WITHOUT LONG-TERM CURRENT USE OF INSULIN: Primary | ICD-10-CM

## 2024-09-06 DIAGNOSIS — M16.12 ARTHRITIS OF LEFT HIP: ICD-10-CM

## 2024-09-06 LAB
EXPIRATION DATE: ABNORMAL
HBA1C MFR BLD: 5.8 % (ref 4.5–5.7)
Lab: ABNORMAL

## 2024-09-06 PROCEDURE — 99214 OFFICE O/P EST MOD 30 MIN: CPT | Performed by: FAMILY MEDICINE

## 2024-09-06 PROCEDURE — 83036 HEMOGLOBIN GLYCOSYLATED A1C: CPT | Performed by: FAMILY MEDICINE

## 2024-09-06 RX ORDER — ORAL SEMAGLUTIDE 14 MG/1
14 TABLET ORAL EVERY MORNING
Qty: 90 TABLET | Refills: 1 | Status: SHIPPED | OUTPATIENT
Start: 2024-09-06

## 2024-09-06 NOTE — ASSESSMENT & PLAN NOTE
Diabetes is stable.   Medication changes per orders.  Labs due next visit, prefers to do that day rather than prior to appointment.  Diabetes will be reassessed  in January at physical.

## 2024-09-06 NOTE — ASSESSMENT & PLAN NOTE
Last TSH therapeutic. Continue levothyroxine. Will need to check TSH at least once a year on labs, plan to do next visit.

## 2024-09-06 NOTE — ASSESSMENT & PLAN NOTE
Discuss with orthopedics. Ask about MRI, knee. Let them know how severe this is impacting his life.

## 2024-09-06 NOTE — PROGRESS NOTES
"Chief Complaint  Diabetes    Subjective        Dom Marin presents to [facility]  History of Present Illness  Hip pain left worsening. Followed by orthopedics. Limping. Cannot put on shoes/tie without assistance. A cane was brought up at last visit with orthopedics. Starting to interfere with work. Wonders if he should have MRI before potential hip surgery and/or check out knee.    Pain in shoulder area left brief pain with numbness/tingling in arm. No chest pain. Seems to happen when he turns head toward right. No desire to go through additional testing or procedures at this time wants hip addressed first.      Hyperlipidemia: chronic. Stable. On Crestor.    Diabetes  He presents for his follow-up diabetic visit. He has type 2 diabetes mellitus. No MedicAlert identification noted. The initial diagnosis of diabetes was made 8 years ago. Pertinent negatives for hypoglycemia include no confusion, headaches, speech difficulty, sweats or tremors. Associated symptoms include weight loss. Pertinent negatives for diabetes include no blurred vision, no foot paresthesias, no foot ulcerations, no polydipsia and no polyuria. Pertinent negatives for hypoglycemia complications include no blackouts, no hospitalization, no nocturnal hypoglycemia, no required assistance and no required glucagon injection. Symptoms are improving. He is compliant with treatment all of the time. He is following a generally healthy diet. Meal planning includes calorie counting and avoidance of concentrated sweets. He rarely participates in exercise. He does not see a podiatrist. Eye exam is not current.   Additional comments: Past use of metformin.  Tolerating Rybelsus 7 mg but weight plateaued. Interested in 14 mg.  Previously A1C up to 6.9 February 2020      Objective   Vital Signs:  /76   Pulse 66   Temp 97.2 °F (36.2 °C) (Temporal)   Ht 175.3 cm (69.02\")   Wt 102 kg (225 lb 12.8 oz)   SpO2 96%   BMI " Called patient's cell phone, LM with results RN instructed patient to call the office back with any further questions or concerns.     Called patient to inform her that her INR result on 11/09/2023 was 2.8.   Verified with patient that she takes Warfarin 5 mg on Mondays and Thursdays and then 2.5 mg the rest of the week, patient confirmed.   Instructed patient to CPM and that her INR will be checked in 1 month on 12//2023. Patient verbalized an understanding.     Called Hayley from [facility] and informed her that the patient needed to be drawn on 12/11/2023. Hayley verbalized an understanding.      is in the office today supervising treatment.     "33.33 kg/m²   Estimated body mass index is 33.33 kg/m² as calculated from the following:    Height as of this encounter: 175.3 cm (69.02\").    Weight as of this encounter: 102 kg (225 lb 12.8 oz).     BMI is >= 30 and <35. (Class 1 Obesity). The following options were offered after discussion;: pharmacological intervention options        Physical Exam  Vitals and nursing note reviewed.   Constitutional:       General: He is not in acute distress.     Appearance: Normal appearance. He is well-developed and well-groomed. He is obese. He is not ill-appearing, toxic-appearing or diaphoretic.   HENT:      Head: Normocephalic and atraumatic.      Right Ear: Hearing normal.      Left Ear: Hearing normal.   Eyes:      General: Lids are normal. No scleral icterus.        Right eye: No discharge.         Left eye: No discharge.      Extraocular Movements: Extraocular movements intact.   Cardiovascular:      Rate and Rhythm: Normal rate and regular rhythm.   Pulmonary:      Effort: Pulmonary effort is normal.   Musculoskeletal:      Cervical back: Neck supple. Spasms (trapezius left) present. No bony tenderness.   Skin:     Coloration: Skin is not jaundiced or pale.   Neurological:      General: No focal deficit present.      Mental Status: He is alert and oriented to person, place, and time.      Gait: Gait abnormal (limping).   Psychiatric:         Attention and Perception: Attention and perception normal.         Mood and Affect: Mood and affect normal.         Speech: Speech normal.         Behavior: Behavior normal. Behavior is cooperative.         Thought Content: Thought content normal.         Cognition and Memory: Cognition and memory normal.         Judgment: Judgment normal.        Result Review :  The following data was reviewed by: Suma Zurita MD on 09/06/2024:  Lab Results   Component Value Date    HGBA1C 5.8 (A) 09/06/2024    HGBA1C 5.6 06/04/2024    HGBA1C 5.7 02/23/2024      Reviewed hip imaging, " arthritic changes.         Assessment and Plan   Diagnoses and all orders for this visit:    1. Type 2 diabetes mellitus with other circulatory complication, without long-term current use of insulin (Primary)  Assessment & Plan:  Diabetes is stable.   Medication changes per orders.  Labs due next visit, prefers to do that day rather than prior to appointment.  Diabetes will be reassessed  in January at physical.    Orders:  -     POC Glycosylated Hemoglobin (Hb A1C)  -     Semaglutide (Rybelsus) 14 MG tablet; Take 1 tablet by mouth Every Morning.  Dispense: 90 tablet; Refill: 1    2. Arthritis of left hip  Assessment & Plan:  Discuss with orthopedics. Ask about MRI, knee. Let them know how severe this is impacting his life.      3. Class 1 obesity due to excess calories with serious comorbidity and body mass index (BMI) of 33.0 to 33.9 in adult  Comments:  adjusted rybelsus; unable to exercise due to hip    4. Cervical radiculopathy  Comments:  consider neck imaging if worsens    5. Hyperlipidemia LDL goal <130  Assessment & Plan:  Continue Crestor. Check fasting labs next visit.               Follow Up   Return in about 4 months (around 1/2/2025) for Annual physical, fasting, if possible labs that day.  Patient was given instructions and counseling regarding his condition or for health maintenance advice. Please see specific information pulled into the AVS if appropriate.

## 2024-09-17 ENCOUNTER — TELEPHONE (OUTPATIENT)
Dept: ORTHOPEDIC SURGERY | Facility: CLINIC | Age: 59
End: 2024-09-17
Payer: OTHER GOVERNMENT

## 2024-09-17 DIAGNOSIS — M25.552 ARTHRALGIA OF LEFT HIP: ICD-10-CM

## 2024-09-17 DIAGNOSIS — M16.12 ARTHRITIS OF LEFT HIP: Primary | ICD-10-CM

## 2024-09-23 ENCOUNTER — OFFICE VISIT (OUTPATIENT)
Dept: ORTHOPEDIC SURGERY | Facility: CLINIC | Age: 59
End: 2024-09-23
Payer: OTHER GOVERNMENT

## 2024-09-23 VITALS
WEIGHT: 230 LBS | SYSTOLIC BLOOD PRESSURE: 122 MMHG | DIASTOLIC BLOOD PRESSURE: 80 MMHG | HEIGHT: 69 IN | TEMPERATURE: 98.2 F | BODY MASS INDEX: 34.07 KG/M2

## 2024-09-23 DIAGNOSIS — M25.552 ARTHRALGIA OF LEFT HIP: ICD-10-CM

## 2024-09-23 DIAGNOSIS — M16.12 PRIMARY OSTEOARTHRITIS OF LEFT HIP: Primary | ICD-10-CM

## 2024-09-23 PROCEDURE — 99214 OFFICE O/P EST MOD 30 MIN: CPT | Performed by: STUDENT IN AN ORGANIZED HEALTH CARE EDUCATION/TRAINING PROGRAM

## 2024-09-25 ENCOUNTER — PREP FOR SURGERY (OUTPATIENT)
Dept: OTHER | Facility: HOSPITAL | Age: 59
End: 2024-09-25
Payer: OTHER GOVERNMENT

## 2024-09-25 DIAGNOSIS — Z01.818 PRE-OP TESTING: Primary | ICD-10-CM

## 2024-10-01 RX ORDER — TRANEXAMIC ACID 10 MG/ML
1000 INJECTION, SOLUTION INTRAVENOUS
OUTPATIENT
Start: 2024-10-02 | End: 2024-10-03

## 2024-10-01 RX ORDER — FAMOTIDINE 10 MG/ML
20 INJECTION, SOLUTION INTRAVENOUS
OUTPATIENT
Start: 2024-10-02 | End: 2024-10-03

## 2024-10-02 PROBLEM — Z01.818 PRE-OP TESTING: Status: ACTIVE | Noted: 2024-09-25

## 2024-10-14 ENCOUNTER — HOSPITAL ENCOUNTER (OUTPATIENT)
Dept: GENERAL RADIOLOGY | Facility: HOSPITAL | Age: 59
Discharge: HOME | End: 2024-10-14
Payer: OTHER GOVERNMENT

## 2024-10-14 ENCOUNTER — PRE-ADMISSION TESTING (OUTPATIENT)
Dept: PREADMISSION TESTING | Facility: HOSPITAL | Age: 59
End: 2024-10-14
Payer: OTHER GOVERNMENT

## 2024-10-14 ENCOUNTER — OFFICE VISIT (OUTPATIENT)
Dept: ORTHOPEDIC SURGERY | Facility: CLINIC | Age: 59
End: 2024-10-14
Payer: OTHER GOVERNMENT

## 2024-10-14 VITALS
BODY MASS INDEX: 34.54 KG/M2 | WEIGHT: 233.2 LBS | SYSTOLIC BLOOD PRESSURE: 134 MMHG | DIASTOLIC BLOOD PRESSURE: 84 MMHG | HEIGHT: 69 IN | TEMPERATURE: 96.9 F

## 2024-10-14 VITALS — BODY MASS INDEX: 34.44 KG/M2 | HEIGHT: 69 IN

## 2024-10-14 DIAGNOSIS — Z01.818 PRE-OP TESTING: ICD-10-CM

## 2024-10-14 DIAGNOSIS — M16.12 PRIMARY OSTEOARTHRITIS OF LEFT HIP: Primary | ICD-10-CM

## 2024-10-14 DIAGNOSIS — M25.552 ARTHRALGIA OF LEFT HIP: ICD-10-CM

## 2024-10-14 LAB
ABO GROUP BLD: NORMAL
ALBUMIN SERPL-MCNC: 4.1 G/DL (ref 3.5–5.2)
ALBUMIN/GLOB SERPL: 1.3 G/DL
ALP SERPL-CCNC: 76 U/L (ref 39–117)
ALT SERPL W P-5'-P-CCNC: 15 U/L (ref 1–41)
ANION GAP SERPL CALCULATED.3IONS-SCNC: 11.6 MMOL/L (ref 5–15)
APTT PPP: 26.6 SECONDS (ref 23–35)
AST SERPL-CCNC: 15 U/L (ref 1–40)
BASOPHILS # BLD AUTO: 0.11 10*3/MM3 (ref 0–0.2)
BASOPHILS NFR BLD AUTO: 1 % (ref 0–1.5)
BILIRUB SERPL-MCNC: 0.4 MG/DL (ref 0–1.2)
BILIRUB UR QL STRIP: NEGATIVE
BUN SERPL-MCNC: 17 MG/DL (ref 6–20)
BUN/CREAT SERPL: 17.7 (ref 7–25)
CALCIUM SPEC-SCNC: 9.2 MG/DL (ref 8.6–10.5)
CHLORIDE SERPL-SCNC: 105 MMOL/L (ref 98–107)
CLARITY UR: CLEAR
CO2 SERPL-SCNC: 22.4 MMOL/L (ref 22–29)
COLOR UR: YELLOW
CREAT SERPL-MCNC: 0.96 MG/DL (ref 0.76–1.27)
DEPRECATED RDW RBC AUTO: 45 FL (ref 37–54)
EGFRCR SERPLBLD CKD-EPI 2021: 91.1 ML/MIN/1.73
EOSINOPHIL # BLD AUTO: 0.33 10*3/MM3 (ref 0–0.4)
EOSINOPHIL NFR BLD AUTO: 2.9 % (ref 0.3–6.2)
ERYTHROCYTE [DISTWIDTH] IN BLOOD BY AUTOMATED COUNT: 12.8 % (ref 12.3–15.4)
GLOBULIN UR ELPH-MCNC: 3.1 GM/DL
GLUCOSE SERPL-MCNC: 101 MG/DL (ref 65–99)
GLUCOSE UR STRIP-MCNC: NEGATIVE MG/DL
HBA1C MFR BLD: 5.9 % (ref 4.8–5.6)
HCT VFR BLD AUTO: 43 % (ref 37.5–51)
HGB BLD-MCNC: 14 G/DL (ref 13–17.7)
HGB UR QL STRIP.AUTO: NEGATIVE
IMM GRANULOCYTES # BLD AUTO: 0.05 10*3/MM3 (ref 0–0.05)
IMM GRANULOCYTES NFR BLD AUTO: 0.4 % (ref 0–0.5)
INR PPP: 0.98 (ref 0.9–1.1)
KETONES UR QL STRIP: NEGATIVE
LEUKOCYTE ESTERASE UR QL STRIP.AUTO: NEGATIVE
LYMPHOCYTES # BLD AUTO: 4.31 10*3/MM3 (ref 0.7–3.1)
LYMPHOCYTES NFR BLD AUTO: 37.8 % (ref 19.6–45.3)
MCH RBC QN AUTO: 31 PG (ref 26.6–33)
MCHC RBC AUTO-ENTMCNC: 32.6 G/DL (ref 31.5–35.7)
MCV RBC AUTO: 95.1 FL (ref 79–97)
MONOCYTES # BLD AUTO: 1.09 10*3/MM3 (ref 0.1–0.9)
MONOCYTES NFR BLD AUTO: 9.6 % (ref 5–12)
NEUTROPHILS NFR BLD AUTO: 48.3 % (ref 42.7–76)
NEUTROPHILS NFR BLD AUTO: 5.52 10*3/MM3 (ref 1.7–7)
NITRITE UR QL STRIP: NEGATIVE
NRBC BLD AUTO-RTO: 0 /100 WBC (ref 0–0.2)
PH UR STRIP.AUTO: 5.5 [PH] (ref 5–8)
PLATELET # BLD AUTO: 224 10*3/MM3 (ref 140–450)
PMV BLD AUTO: 10.3 FL (ref 6–12)
POTASSIUM SERPL-SCNC: 4.4 MMOL/L (ref 3.5–5.2)
PROT SERPL-MCNC: 7.2 G/DL (ref 6–8.5)
PROT UR QL STRIP: NEGATIVE
PROTHROMBIN TIME: 13.5 SECONDS (ref 12.3–15.1)
QT INTERVAL: 378 MS
QTC INTERVAL: 413 MS
RBC # BLD AUTO: 4.52 10*6/MM3 (ref 4.14–5.8)
RH BLD: POSITIVE
SODIUM SERPL-SCNC: 139 MMOL/L (ref 136–145)
SP GR UR STRIP: 1.01 (ref 1–1.03)
UROBILINOGEN UR QL STRIP: NORMAL
WBC NRBC COR # BLD AUTO: 11.41 10*3/MM3 (ref 3.4–10.8)

## 2024-10-14 PROCEDURE — 86901 BLOOD TYPING SEROLOGIC RH(D): CPT

## 2024-10-14 PROCEDURE — 85025 COMPLETE CBC W/AUTO DIFF WBC: CPT

## 2024-10-14 PROCEDURE — 93005 ELECTROCARDIOGRAM TRACING: CPT

## 2024-10-14 PROCEDURE — 80053 COMPREHEN METABOLIC PANEL: CPT

## 2024-10-14 PROCEDURE — 71046 X-RAY EXAM CHEST 2 VIEWS: CPT

## 2024-10-14 PROCEDURE — S0260 H&P FOR SURGERY: HCPCS | Performed by: ORTHOPAEDIC SURGERY

## 2024-10-14 PROCEDURE — 86900 BLOOD TYPING SEROLOGIC ABO: CPT

## 2024-10-14 PROCEDURE — 85730 THROMBOPLASTIN TIME PARTIAL: CPT

## 2024-10-14 PROCEDURE — 87081 CULTURE SCREEN ONLY: CPT

## 2024-10-14 PROCEDURE — 85610 PROTHROMBIN TIME: CPT

## 2024-10-14 PROCEDURE — 83036 HEMOGLOBIN GLYCOSYLATED A1C: CPT

## 2024-10-14 PROCEDURE — 36415 COLL VENOUS BLD VENIPUNCTURE: CPT

## 2024-10-14 PROCEDURE — 81003 URINALYSIS AUTO W/O SCOPE: CPT

## 2024-10-14 RX ORDER — ACETAMINOPHEN AND IBUPROFEN 250; 125 MG/1; MG/1
2 TABLET ORAL EVERY 6 HOURS PRN
COMMUNITY

## 2024-10-14 NOTE — PROGRESS NOTES
Dom Marin is a 58 y.o. right hand dominant male referred by Suma Zurita MD and here today for a pre-operative history and physical discussion of treatment plans, surgery, and rehabilitation.  No chief complaint on file.       CHIEF COMPLAINT:    Pre-operative evaluation with history and physical exam    History of Present Illness      Patient is a 58 year old with chronic and progressive worsening left hip pain, stiffness and activity limitations.  Treatment has included mediations (he states recently he he has taken a lot of Motrin and Tylenol), arthritis exercises, image guided injection therapy and with only temporary or limited relief.  Patient is  at [employer].    PAST MEDICAL HISTORY:    Past Medical History:   Diagnosis Date    Arthritis May, 2022    Left hip    Colon polyp     Diabetes mellitus     Fracture     Hyperlipidemia     Hypertension     Hypothyroidism     L-thyroxine    Obesity     Sleep apnea     no CPAP       Past Surgical History:   Procedure Laterality Date    COLONOSCOPY      TONSILLECTOMY         Family History   Problem Relation Age of Onset    Diabetes Mother     Hyperlipidemia Mother     Cancer Father     Hypertension Father        Social History     Socioeconomic History    Marital status:    Tobacco Use    Smoking status: Former     Current packs/day: 0.00     Average packs/day: 1.1 packs/day for 31.2 years (35.1 ttl pk-yrs)     Types: Cigarettes     Start date: 10/15/1984     Quit date: 2008     Years since quittin.6    Smokeless tobacco: Former     Types: Snuff     Quit date: 2008   Vaping Use    Vaping status: Never Used   Substance and Sexual Activity    Alcohol use: Yes     Alcohol/week: 14.0 standard drinks of alcohol     Types: 12 Cans of beer, 2 Drinks containing 0.5 oz of alcohol per week    Drug use: No    Sexual activity: Defer          Current Outpatient Medications:     Ascorbic Acid (VITAMIN C) 500 MG capsule,  "Take  by mouth., Disp: , Rfl:     B Complex Vitamins (VITAMIN B COMPLEX PO), Take  by mouth., Disp: , Rfl:     levothyroxine (Synthroid) 75 MCG tablet, Take 1 tablet by mouth Every Morning for 28 days., Disp: 28 tablet, Rfl: 0    rosuvastatin (CRESTOR) 20 MG tablet, TAKE 1 TABLET EVERY NIGHT TO LOWER CHOLESTEROL AND RISK OF STROKE, Disp: 90 tablet, Rfl: 3    Semaglutide (Rybelsus) 14 MG tablet, Take 1 tablet by mouth Every Morning., Disp: 90 tablet, Rfl: 1    Ibuprofen-Acetaminophen (Acetaminophen-Ibuprofen) 125-250 MG tablet, Take 2 tablets by mouth Every 6 (Six) Hours As Needed., Disp: , Rfl:     No Known Allergies    Review of Systems   Constitutional:  Negative for fever.   HENT:  Negative for voice change.    Eyes:  Negative for redness.   Respiratory:  Negative for shortness of breath.    Cardiovascular:  Negative for chest pain.   Gastrointestinal:  Negative for abdominal distention.   Musculoskeletal:  Positive for arthralgias, gait problem and joint swelling.   Skin:  Negative for color change and rash.   Neurological:  Negative for speech difficulty.   Psychiatric/Behavioral:  Negative for confusion.      I have reviewed the medical and surgical history, family history, social history, medications, and/or allergies, and the review of systems of this report.    PHYSICAL EXAMINATION:       /84   Temp 96.9 °F (36.1 °C)   Ht 175.3 cm (69.02\")   Wt 106 kg (233 lb 3.2 oz)   BMI 34.42 kg/m²     GENERAL [x] Well developed  []Ill appearing [x] No acute distress    HEENT [x]No acute changes [x] Normocephalic, atraumatic    NECK [x]Supple  [] No midline tenderness    LUNGS [x]Clear bilaterally [x]No wheezes []Rhonchi [] Rales    HEART [x] Regular rate  [x] Regular rhythm with an infrequent irregular beat on auscultation.  No clinical symptoms of any chest pain or shortness of breath.  Pre-op tests to include EKG and will review and plan accordingly.    ABDOMEN [x] Soft [x] Not tender [x] Not distended [x] " Normal sounds    VAS/EXT [x] Normal Pulses []Edema []Cyanosis             SKIN [x] Warm [x]Dry []Pink []Ecchymosis []Cool    NEURO [x] Sensation Intact [x] Motor Intact [x] Pulse intact    MUSCULOSKELETAL [x]  Left hip and groin pain increased with movements, marked hip stiffness with flexion limited to 70 degrees, internal rotation 0 degrees, external rotation 20 degrees.  MMT 4/5 hip flexion, 4/5 abduction, 5/5 internal rotation and 4/5 external rotation.  Positive FADIR.  No calf pain.  Nichole sign negative.  Antalgic left hip limp.        DVT Screening: No Yes   Smoker (he quit 17 years ago in 2008) [x] []   Personal/Family History of DVT or PE [x] []   Sedentary Lifestyle [x] []   BMI >30 [] [x]      Tranexamic acid TXA criteria for usage during arthroplasty surgery.    Previous or Active DVT/PE: NO  Cardiac Stent within 1 year: NO  Embolic/Ischemic stroke within 1 year: NO  GFR less than 30ml/min (advanced kidney disease): NO  NOTE:  TXA  tranexemic acid summary: THIS PATIENT IS A CANDIDATE FOR IV TXA DURING SURGERY.     Independent Review of Radiographic Studies:    No new imaging done today.  Reviewed relevant prior imaging with patient again today.    Laboratory and Other Studies:  No new results reviewed today.     Medical Decision Making:    Limited progress with persistent and worsening symptoms.   Continue care plan with work-up and treatment as noted.  Elective surgical treatment of chronic condition.  Medications as prescribed and only as tolerated.  Physical and occupational therapy planned.  Decision for surgery and patient counseling as noted in report.  Activity restrictions as appropriate.  We took time today to discuss the relative risks, benefits and merits of total hip replacement, surgical aspects, hip implants design and materials, anesthesia options and considerations, post-operative rehabilitation and outcome expectations, and his questions answered.   Patient knows and will stop Motrin or  any anti-inflammatory medication one week before surgery.  He can continue Tylenol extra strength prn for pain.        Diagnoses and all orders for this visit:    1. Primary osteoarthritis of left hip (Primary)    2. Arthralgia of left hip       Assessment/Plan:    *SPECIAL INSTRUCTIONS:      Multi-modal analgesia.    Multi-modal DVT prophylaxis.   Tranexamic acid IV protocol (see screening parameters this report).    Rehabilitation PT/OT protocol with same day walker ambulation with therapist.  Post-discharge rehabilitation PT/OT planned.    Risks, benefits, and alternative treatments discussed with the patient: [x] Yes [] No    Risk benefits and merits of the proposed surgery were discussed and the patient's questions were answered risks discussed including and not limited to:  Anesthesia reactions  Blood loss and possible transfusion  Infection  Deep venous thrombosis and pulmonary embolus  Nerve, vascular or tendon injury  Fracture  Deformity  Stiffness  Weakness  Prosthesis and implant issues such as loosening, material wear or dislocation  Skin necrosis  Revision surgery or further treatment  Recurrence of problem and condition     Informed consent: [] Signed  [x] To be obtained at hospital  [] Both    Recommendations/Plan:  Discussion of orthopaedic goals and activities and patient expressed appreciation.  Risk, benefits, and merits of treatment alternatives reviewed with the patient and questions answered.  Regular exercise as tolerated.  The nature of the proposed surgery reviewed with patient including risks, benefits, rehabilitation, recovery time, and outcome expectations.  Ice, heat, and/or modalities as beneficial.  Weight bearing parameters reviewed.  Walking assistive device encouraged for safety and support.  Physical therapy program planned.  Take prescribed medications as instructed only as tolerated.  After care and dental prophylaxis for joint replacement prosthesis.  Hip replacement precautions  reviewed.    Exercise, medications, injections, other patient advice, and return appointment as noted.  Brace: No brace was given at today's visit.  Referral: No referrals made at today's visit.  Test/Studies: Pre-op labs and tests.  Surgery: Surgery proposed at this visit as noted.  Work/Activity Status: Usual activities, routine exercise as tolerated, no strenuous activity.    PLANNED SURGICAL PROCEDURE:  Elective left total hip replacement.    Return in about 5 weeks (around 11/18/2024) for Post-op, recheck.  Patient is encouraged and agreeable to call or return sooner for any issues or concerns.

## 2024-10-14 NOTE — DISCHARGE INSTRUCTIONS
Pre-Admission testing appointment completed today for patient's upcoming procedure here at [facility].    PAT PASS reviewed with patient and they verbalize understanding of the following:     Do not eat or drink anything after midnight the night before procedure unless otherwise instructed by physician/surgeon's office, this includes no gum, candy, mints, tobacco products or e-cigarettes.  Do not shave the area to be operated on at least 48 hours prior to procedure.  Do not wear makeup, lotion, hair products, or nail polish.  Do not wear any jewelry and remove all piercings.  Do not wear any adhesive if you wear dentures.  Do not wear contacts; bring in glasses if needed.  Only take medications on the morning of procedure as instructed by PAT nurse per anesthesia guidelines or as instructed by physician's office.  If you are on any blood thinners reach out to the physician/surgeon's office for instructions on when/if they will need to be stopped prior to procedure.  Bring in picture ID and insurance card, advanced directive copies if applicable, CPAP/BIPAP/Inhalers if indicated morning of procedure, leave any other valuables at home.  Ensure you have arranged for someone to drive you home the day of your procedure and someone to care for you at home afterwards. It is recommended that you do not drive, drink alcohol, or make any major legal decisions for at least 24 hours after your procedure is complete.  Chlorhexadine sponge along with instruction/information sheet given to patient. Readybath wipes given in lieu of CHG if patient has CHG allergy. Instructed patient to date, time, and initial the verification sheet once skin prep has been completed, and to return to [facility] the day of the procedure.     Introduction to anesthesia video watched by patient during appointment and anesthesia FAQ tip sheet given to patient.  Instructions and information given to patient about parking, hospital entrance, and  registration location.

## 2024-10-14 NOTE — H&P (VIEW-ONLY)
Dom Marin is a 58 y.o. right hand dominant male referred by Suma Zurita MD and here today for a pre-operative history and physical discussion of treatment plans, surgery, and rehabilitation.  No chief complaint on file.       CHIEF COMPLAINT:    Pre-operative evaluation with history and physical exam    History of Present Illness      Patient is a 58 year old with chronic and progressive worsening left hip pain, stiffness and activity limitations.  Treatment has included mediations (he states recently he he has taken a lot of Motrin and Tylenol), arthritis exercises, image guided injection therapy and with only temporary or limited relief.  [facility] is  at [employer].    PAST MEDICAL HISTORY:    Past Medical History:   Diagnosis Date    Arthritis May, 2022    Left hip    Colon polyp     Diabetes mellitus     Fracture     Hyperlipidemia     Hypertension     Hypothyroidism     L-thyroxine    Obesity     Sleep apnea     no CPAP       Past Surgical History:   Procedure Laterality Date    COLONOSCOPY      TONSILLECTOMY         Family History   Problem Relation Age of Onset    Diabetes Mother     Hyperlipidemia Mother     Cancer Father     Hypertension Father        Social History     Socioeconomic History    Marital status:    Tobacco Use    Smoking status: Former     Current packs/day: 0.00     Average packs/day: 1.1 packs/day for 31.2 years (35.1 ttl pk-yrs)     Types: Cigarettes     Start date: 10/15/1984     Quit date: 2008     Years since quittin.6    Smokeless tobacco: Former     Types: Snuff     Quit date: 2008   Vaping Use    Vaping status: Never Used   Substance and Sexual Activity    Alcohol use: Yes     Alcohol/week: 14.0 standard drinks of alcohol     Types: 12 Cans of beer, 2 Drinks containing 0.5 oz of alcohol per week    Drug use: No    Sexual activity: Defer          Current Outpatient Medications:     Ascorbic Acid (VITAMIN C) 500 MG capsule,  "Take  by mouth., Disp: , Rfl:     B Complex Vitamins (VITAMIN B COMPLEX PO), Take  by mouth., Disp: , Rfl:     levothyroxine (Synthroid) 75 MCG tablet, Take 1 tablet by mouth Every Morning for 28 days., Disp: 28 tablet, Rfl: 0    rosuvastatin (CRESTOR) 20 MG tablet, TAKE 1 TABLET EVERY NIGHT TO LOWER CHOLESTEROL AND RISK OF STROKE, Disp: 90 tablet, Rfl: 3    Semaglutide (Rybelsus) 14 MG tablet, Take 1 tablet by mouth Every Morning., Disp: 90 tablet, Rfl: 1    Ibuprofen-Acetaminophen (Acetaminophen-Ibuprofen) 125-250 MG tablet, Take 2 tablets by mouth Every 6 (Six) Hours As Needed., Disp: , Rfl:     No Known Allergies    Review of Systems   Constitutional:  Negative for fever.   HENT:  Negative for voice change.    Eyes:  Negative for redness.   Respiratory:  Negative for shortness of breath.    Cardiovascular:  Negative for chest pain.   Gastrointestinal:  Negative for abdominal distention.   Musculoskeletal:  Positive for arthralgias, gait problem and joint swelling.   Skin:  Negative for color change and rash.   Neurological:  Negative for speech difficulty.   Psychiatric/Behavioral:  Negative for confusion.      I have reviewed the medical and surgical history, family history, social history, medications, and/or allergies, and the review of systems of this report.    PHYSICAL EXAMINATION:       /84   Temp 96.9 °F (36.1 °C)   Ht 175.3 cm (69.02\")   Wt 106 kg (233 lb 3.2 oz)   BMI 34.42 kg/m²     GENERAL [x] Well developed  []Ill appearing [x] No acute distress    HEENT [x]No acute changes [x] Normocephalic, atraumatic    NECK [x]Supple  [] No midline tenderness    LUNGS [x]Clear bilaterally [x]No wheezes []Rhonchi [] Rales    HEART [x] Regular rate  [x] Regular rhythm with an infrequent irregular beat on auscultation.  No clinical symptoms of any chest pain or shortness of breath.  Pre-op tests to include EKG and will review and plan accordingly.    ABDOMEN [x] Soft [x] Not tender [x] Not distended [x] " Normal sounds    VAS/EXT [x] Normal Pulses []Edema []Cyanosis             SKIN [x] Warm [x]Dry []Pink []Ecchymosis []Cool    NEURO [x] Sensation Intact [x] Motor Intact [x] Pulse intact    MUSCULOSKELETAL [x]  Left hip and groin pain increased with movements, marked hip stiffness with flexion limited to 70 degrees, internal rotation 0 degrees, external rotation 20 degrees.  MMT 4/5 hip flexion, 4/5 abduction, 5/5 internal rotation and 4/5 external rotation.  Positive FADIR.  No calf pain.  Nichole sign negative.  Antalgic left hip limp.        DVT Screening: No Yes   Smoker (he quit 17 years ago in 2008) [x] []   Personal/Family History of DVT or PE [x] []   Sedentary Lifestyle [x] []   BMI >30 [] [x]      Tranexamic acid TXA criteria for usage during arthroplasty surgery.    Previous or Active DVT/PE: NO  Cardiac Stent within 1 year: NO  Embolic/Ischemic stroke within 1 year: NO  GFR less than 30ml/min (advanced kidney disease): NO  NOTE:  TXA  tranexemic acid summary: THIS PATIENT IS A CANDIDATE FOR IV TXA DURING SURGERY.     Independent Review of Radiographic Studies:    No new imaging done today.  Reviewed relevant prior imaging with patient again today.    Laboratory and Other Studies:  No new results reviewed today.     Medical Decision Making:    Limited progress with persistent and worsening symptoms.   Continue care plan with work-up and treatment as noted.  Elective surgical treatment of chronic condition.  Medications as prescribed and only as tolerated.  Physical and occupational therapy planned.  Decision for surgery and patient counseling as noted in report.  Activity restrictions as appropriate.  We took time today to discuss the relative risks, benefits and merits of total hip replacement, surgical aspects, hip implants design and materials, anesthesia options and considerations, post-operative rehabilitation and outcome expectations, and his questions answered.   Patient knows and will stop Motrin or  any anti-inflammatory medication one week before surgery.  He can continue Tylenol extra strength prn for pain.        Diagnoses and all orders for this visit:    1. Primary osteoarthritis of left hip (Primary)    2. Arthralgia of left hip       Assessment/Plan:    *SPECIAL INSTRUCTIONS:      Multi-modal analgesia.    Multi-modal DVT prophylaxis.   Tranexamic acid IV protocol (see screening parameters this report).    Rehabilitation PT/OT protocol with same day walker ambulation with therapist.  Post-discharge rehabilitation PT/OT planned.    Risks, benefits, and alternative treatments discussed with the patient: [x] Yes [] No    Risk benefits and merits of the proposed surgery were discussed and the patient's questions were answered risks discussed including and not limited to:  Anesthesia reactions  Blood loss and possible transfusion  Infection  Deep venous thrombosis and pulmonary embolus  Nerve, vascular or tendon injury  Fracture  Deformity  Stiffness  Weakness  Prosthesis and implant issues such as loosening, material wear or dislocation  Skin necrosis  Revision surgery or further treatment  Recurrence of problem and condition     Informed consent: [] Signed  [x] To be obtained at hospital  [] Both    Recommendations/Plan:  Discussion of orthopaedic goals and activities and patient expressed appreciation.  Risk, benefits, and merits of treatment alternatives reviewed with the patient and questions answered.  Regular exercise as tolerated.  The nature of the proposed surgery reviewed with patient including risks, benefits, rehabilitation, recovery time, and outcome expectations.  Ice, heat, and/or modalities as beneficial.  Weight bearing parameters reviewed.  Walking assistive device encouraged for safety and support.  Physical therapy program planned.  Take prescribed medications as instructed only as tolerated.  After care and dental prophylaxis for joint replacement prosthesis.  Hip replacement precautions  reviewed.    Exercise, medications, injections, other patient advice, and return appointment as noted.  Brace: No brace was given at today's visit.  Referral: No referrals made at today's visit.  Test/Studies: Pre-op labs and tests.  Surgery: Surgery proposed at this visit as noted.  Work/Activity Status: Usual activities, routine exercise as tolerated, no strenuous activity.    PLANNED SURGICAL PROCEDURE:  Elective left total hip replacement.    Return in about 5 weeks (around 11/18/2024) for Post-op, recheck.  Patient is encouraged and agreeable to call or return sooner for any issues or concerns.

## 2024-10-15 ENCOUNTER — APPOINTMENT (OUTPATIENT)
Dept: PREADMISSION TESTING | Facility: HOSPITAL | Age: 59
End: 2024-10-15
Payer: OTHER GOVERNMENT

## 2024-10-15 LAB — MRSA SPEC QL CULT: NORMAL

## 2024-10-18 ENCOUNTER — TELEPHONE (OUTPATIENT)
Dept: INTERNAL MEDICINE | Facility: CLINIC | Age: 59
End: 2024-10-18
Payer: OTHER GOVERNMENT

## 2024-10-18 NOTE — TELEPHONE ENCOUNTER
Patient is having a total hip replacement on 10/29/2024 with [facility]. He needs a preop clearance prior due to elevated white blood count on his testing. Call patient at [phone]

## 2024-10-21 ENCOUNTER — TELEPHONE (OUTPATIENT)
Dept: ORTHOPEDIC SURGERY | Facility: CLINIC | Age: 59
End: 2024-10-21
Payer: OTHER GOVERNMENT

## 2024-10-21 NOTE — TELEPHONE ENCOUNTER
Caller: EULALIO  Relationship to Patient: SELF  Phone Number: [phone]  Reason for Call: PATIENT CALLING TO CHECK ON HIS SHORT TERM DISABILITY PAPERWORK HE STATES HIS JOB SAYS THEY HAVE NOT RECIEVED ANYTHING

## 2024-10-21 NOTE — TELEPHONE ENCOUNTER
Returned call to inform we have not received the paperwork, he is waiting to hear back from his HR regarding sending that to us, also informed of the $25 form fee before we can send it back

## 2024-10-22 ENCOUNTER — OFFICE VISIT (OUTPATIENT)
Dept: INTERNAL MEDICINE | Facility: CLINIC | Age: 59
End: 2024-10-22
Payer: OTHER GOVERNMENT

## 2024-10-22 VITALS
HEIGHT: 69 IN | BODY MASS INDEX: 33.62 KG/M2 | HEART RATE: 58 BPM | OXYGEN SATURATION: 95 % | WEIGHT: 227 LBS | RESPIRATION RATE: 18 BRPM | TEMPERATURE: 97.5 F | DIASTOLIC BLOOD PRESSURE: 88 MMHG | SYSTOLIC BLOOD PRESSURE: 138 MMHG

## 2024-10-22 DIAGNOSIS — E11.59 TYPE 2 DIABETES MELLITUS WITH OTHER CIRCULATORY COMPLICATION, WITHOUT LONG-TERM CURRENT USE OF INSULIN: ICD-10-CM

## 2024-10-22 DIAGNOSIS — I10 ESSENTIAL HYPERTENSION: ICD-10-CM

## 2024-10-22 DIAGNOSIS — Z01.818 PRE-OP EXAMINATION: Primary | ICD-10-CM

## 2024-10-22 PROCEDURE — 99213 OFFICE O/P EST LOW 20 MIN: CPT | Performed by: STUDENT IN AN ORGANIZED HEALTH CARE EDUCATION/TRAINING PROGRAM

## 2024-10-22 RX ORDER — LEVOTHYROXINE SODIUM 75 UG/1
75 TABLET ORAL DAILY
COMMUNITY

## 2024-10-22 NOTE — PROGRESS NOTES
Subjective   Dom Marin is a 58 y.o. male.     History of Present Illness  Patient presents for preop exam for left hip replacement.  Patient had blood work done preop and had some slight elevation in his white blood cells.  Also had elevated lymphocytes.  Looks to be from probably a viral infection.  Patient's blood cells normally run in normal range were elevated 2 other times in the last couple years but was really unremarkable.  Patient states that hip the only surgery he is ever had is a tonsillectomy.  He denies any family history of bleeding disorders or trouble with anesthesia.  States that he can walk up a flight of stairs without losing his breath.  Can walk to his mailbox without losing his breath.  Denies chest pain.  Diabetes and hypertension have been well-controlled.      The following portions of the patient's history were reviewed and updated as appropriate: allergies, current medications, past family history, past medical history, past social history, past surgical history, and problem list.    Review of Systems   HENT:  Negative for congestion and sore throat.    Respiratory:  Negative for cough.    Neurological:  Negative for headaches.   All other systems reviewed and are negative.      Objective   Physical Exam  Vitals and nursing note reviewed.   Constitutional:       Appearance: Normal appearance. He is normal weight.   HENT:      Head: Normocephalic and atraumatic.      Right Ear: External ear normal.      Left Ear: External ear normal.      Nose: Nose normal.      Mouth/Throat:      Mouth: Mucous membranes are moist.      Pharynx: Oropharynx is clear.   Eyes:      Extraocular Movements: Extraocular movements intact.      Conjunctiva/sclera: Conjunctivae normal.      Pupils: Pupils are equal, round, and reactive to light.   Cardiovascular:      Rate and Rhythm: Normal rate and regular rhythm.      Pulses: Normal pulses.      Heart sounds: Normal heart sounds. No murmur heard.     No  gallop.   Pulmonary:      Effort: Pulmonary effort is normal. No respiratory distress.      Breath sounds: Normal breath sounds. No wheezing, rhonchi or rales.   Abdominal:      General: Abdomen is flat. Bowel sounds are normal.      Palpations: Abdomen is soft.   Musculoskeletal:         General: Normal range of motion.      Cervical back: Normal range of motion and neck supple. No rigidity or tenderness.   Lymphadenopathy:      Cervical: No cervical adenopathy.   Skin:     General: Skin is warm.      Capillary Refill: Capillary refill takes less than 2 seconds.      Coloration: Skin is not jaundiced or pale.      Findings: No bruising, erythema, lesion or rash.   Neurological:      General: No focal deficit present.      Mental Status: He is alert and oriented to person, place, and time. Mental status is at baseline.   Psychiatric:         Mood and Affect: Mood normal.         Behavior: Behavior normal.         Thought Content: Thought content normal.         Judgment: Judgment normal.         Assessment & Plan   Diagnoses and all orders for this visit:    1. Pre-op examination (Primary)    2. Type 2 diabetes mellitus with other circulatory complication, without long-term current use of insulin    3. Essential hypertension    Patient is low risk patient for a moderate risk procedure

## 2024-10-25 ENCOUNTER — ANESTHESIA EVENT (OUTPATIENT)
Dept: PERIOP | Facility: HOSPITAL | Age: 59
End: 2024-10-25
Payer: OTHER GOVERNMENT

## 2024-10-29 ENCOUNTER — HOSPITAL ENCOUNTER (OUTPATIENT)
Facility: HOSPITAL | Age: 59
Setting detail: HOSPITAL OUTPATIENT SURGERY
Discharge: HOME HEALTH | End: 2024-10-29
Attending: ORTHOPAEDIC SURGERY | Admitting: ORTHOPAEDIC SURGERY
Payer: OTHER GOVERNMENT

## 2024-10-29 ENCOUNTER — ANESTHESIA (OUTPATIENT)
Dept: PERIOP | Facility: HOSPITAL | Age: 59
End: 2024-10-29
Payer: OTHER GOVERNMENT

## 2024-10-29 ENCOUNTER — ANESTHESIA EVENT CONVERTED (OUTPATIENT)
Dept: ANESTHESIOLOGY | Facility: HOSPITAL | Age: 59
End: 2024-10-29
Payer: OTHER GOVERNMENT

## 2024-10-29 ENCOUNTER — APPOINTMENT (OUTPATIENT)
Dept: GENERAL RADIOLOGY | Facility: HOSPITAL | Age: 59
End: 2024-10-29
Payer: OTHER GOVERNMENT

## 2024-10-29 VITALS
RESPIRATION RATE: 18 BRPM | HEART RATE: 77 BPM | SYSTOLIC BLOOD PRESSURE: 141 MMHG | DIASTOLIC BLOOD PRESSURE: 93 MMHG | OXYGEN SATURATION: 95 % | TEMPERATURE: 97.1 F

## 2024-10-29 DIAGNOSIS — Z01.818 PRE-OP TESTING: ICD-10-CM

## 2024-10-29 DIAGNOSIS — Z96.642 STATUS POST TOTAL HIP REPLACEMENT, LEFT: Primary | ICD-10-CM

## 2024-10-29 DIAGNOSIS — M16.12 PRIMARY OSTEOARTHRITIS OF LEFT HIP: Primary | ICD-10-CM

## 2024-10-29 LAB
ABO GROUP BLD: NORMAL
BLD GP AB SCN SERPL QL: NEGATIVE
GLUCOSE BLDC GLUCOMTR-MCNC: 116 MG/DL (ref 70–130)
RH BLD: POSITIVE
T&S EXPIRATION DATE: NORMAL

## 2024-10-29 PROCEDURE — 25810000003 LACTATED RINGERS PER 1000 ML: Performed by: NURSE ANESTHETIST, CERTIFIED REGISTERED

## 2024-10-29 PROCEDURE — C1776 JOINT DEVICE (IMPLANTABLE): HCPCS | Performed by: ORTHOPAEDIC SURGERY

## 2024-10-29 PROCEDURE — 86901 BLOOD TYPING SEROLOGIC RH(D): CPT | Performed by: STUDENT IN AN ORGANIZED HEALTH CARE EDUCATION/TRAINING PROGRAM

## 2024-10-29 PROCEDURE — 27130 TOTAL HIP ARTHROPLASTY: CPT | Performed by: ORTHOPAEDIC SURGERY

## 2024-10-29 PROCEDURE — 25010000002 CEFAZOLIN PER 500 MG: Performed by: ORTHOPAEDIC SURGERY

## 2024-10-29 PROCEDURE — 25010000002 LIDOCAINE (CARDIAC): Performed by: NURSE ANESTHETIST, CERTIFIED REGISTERED

## 2024-10-29 PROCEDURE — 25010000002 DEXAMETHASONE PER 1 MG: Performed by: NURSE ANESTHETIST, CERTIFIED REGISTERED

## 2024-10-29 PROCEDURE — 25010000002 BUPIVACAINE (PF) 0.25 % SOLUTION: Performed by: NURSE ANESTHETIST, CERTIFIED REGISTERED

## 2024-10-29 PROCEDURE — 97161 PT EVAL LOW COMPLEX 20 MIN: CPT

## 2024-10-29 PROCEDURE — 25010000002 FENTANYL CITRATE (PF) 50 MCG/ML SOLUTION PREFILLED SYRINGE: Performed by: NURSE ANESTHETIST, CERTIFIED REGISTERED

## 2024-10-29 PROCEDURE — 88300 SURGICAL PATH GROSS: CPT

## 2024-10-29 PROCEDURE — 25010000002 PROPOFOL 10 MG/ML EMULSION: Performed by: NURSE ANESTHETIST, CERTIFIED REGISTERED

## 2024-10-29 PROCEDURE — 72170 X-RAY EXAM OF PELVIS: CPT

## 2024-10-29 PROCEDURE — 86900 BLOOD TYPING SEROLOGIC ABO: CPT | Performed by: STUDENT IN AN ORGANIZED HEALTH CARE EDUCATION/TRAINING PROGRAM

## 2024-10-29 PROCEDURE — 82948 REAGENT STRIP/BLOOD GLUCOSE: CPT

## 2024-10-29 PROCEDURE — C1713 ANCHOR/SCREW BN/BN,TIS/BN: HCPCS | Performed by: ORTHOPAEDIC SURGERY

## 2024-10-29 PROCEDURE — 25010000002 ONDANSETRON PER 1 MG: Performed by: NURSE ANESTHETIST, CERTIFIED REGISTERED

## 2024-10-29 PROCEDURE — 25010000002 MIDAZOLAM PER 1MG: Performed by: NURSE ANESTHETIST, CERTIFIED REGISTERED

## 2024-10-29 PROCEDURE — 25010000002 CEFAZOLIN PER 500 MG: Performed by: STUDENT IN AN ORGANIZED HEALTH CARE EDUCATION/TRAINING PROGRAM

## 2024-10-29 PROCEDURE — 86850 RBC ANTIBODY SCREEN: CPT | Performed by: STUDENT IN AN ORGANIZED HEALTH CARE EDUCATION/TRAINING PROGRAM

## 2024-10-29 PROCEDURE — 25010000002 BUPIVACAINE PF 0.75 % SOLUTION: Performed by: NURSE ANESTHETIST, CERTIFIED REGISTERED

## 2024-10-29 DEVICE — LINER ACET G7/LONGEVITY HI/WL HXPE SZF 36MM: Type: IMPLANTABLE DEVICE | Site: HIP | Status: FUNCTIONAL

## 2024-10-29 DEVICE — SCRW ACET CORT TRILOGY S/TAP 6.5X30: Type: IMPLANTABLE DEVICE | Site: HIP | Status: FUNCTIONAL

## 2024-10-29 DEVICE — CP HIP UPCHRG OSSEOTI LTD HL CUPS: Type: IMPLANTABLE DEVICE | Site: HIP | Status: FUNCTIONAL

## 2024-10-29 DEVICE — TOTAL HIP PRIMARY: Type: IMPLANTABLE DEVICE | Site: HIP | Status: FUNCTIONAL

## 2024-10-29 DEVICE — STEM FEM/HIP TAPERLOC COMPL F/P STD OFFST SZ8: Type: IMPLANTABLE DEVICE | Site: HIP | Status: FUNCTIONAL

## 2024-10-29 DEVICE — ADAPT HIP BIOLOX OPTN TYPE1 TPR STD: Type: IMPLANTABLE DEVICE | Site: HIP | Status: FUNCTIONAL

## 2024-10-29 DEVICE — HD FEM/HIP G7 BIOLOX/DELTA OPTN 36MM: Type: IMPLANTABLE DEVICE | Site: HIP | Status: FUNCTIONAL

## 2024-10-29 DEVICE — SHLL ACET OSSEOTI G7 4HL SZF 54MM: Type: IMPLANTABLE DEVICE | Site: HIP | Status: FUNCTIONAL

## 2024-10-29 RX ORDER — FENTANYL CITRATE 50 UG/ML
INJECTION, SOLUTION INTRAMUSCULAR; INTRAVENOUS AS NEEDED
Status: DISCONTINUED | OUTPATIENT
Start: 2024-10-29 | End: 2024-10-29 | Stop reason: SURG

## 2024-10-29 RX ORDER — HYDROCODONE BITARTRATE AND ACETAMINOPHEN 10; 325 MG/1; MG/1
1 TABLET ORAL ONCE AS NEEDED
Status: DISCONTINUED | OUTPATIENT
Start: 2024-10-29 | End: 2024-10-29 | Stop reason: HOSPADM

## 2024-10-29 RX ORDER — ONDANSETRON 4 MG/1
4 TABLET, FILM COATED ORAL EVERY 8 HOURS PRN
Qty: 20 TABLET | Refills: 0 | Status: SHIPPED | OUTPATIENT
Start: 2024-10-29

## 2024-10-29 RX ORDER — ONDANSETRON 2 MG/ML
INJECTION INTRAMUSCULAR; INTRAVENOUS AS NEEDED
Status: DISCONTINUED | OUTPATIENT
Start: 2024-10-29 | End: 2024-10-29 | Stop reason: SURG

## 2024-10-29 RX ORDER — SODIUM CHLORIDE, SODIUM LACTATE, POTASSIUM CHLORIDE, CALCIUM CHLORIDE 600; 310; 30; 20 MG/100ML; MG/100ML; MG/100ML; MG/100ML
INJECTION, SOLUTION INTRAVENOUS CONTINUOUS PRN
Status: DISCONTINUED | OUTPATIENT
Start: 2024-10-29 | End: 2024-10-29 | Stop reason: SURG

## 2024-10-29 RX ORDER — TRANEXAMIC ACID 10 MG/ML
1000 INJECTION, SOLUTION INTRAVENOUS
Status: COMPLETED | OUTPATIENT
Start: 2024-10-29 | End: 2024-10-29

## 2024-10-29 RX ORDER — ONDANSETRON 2 MG/ML
4 INJECTION INTRAMUSCULAR; INTRAVENOUS ONCE AS NEEDED
Status: DISCONTINUED | OUTPATIENT
Start: 2024-10-29 | End: 2024-10-29 | Stop reason: HOSPADM

## 2024-10-29 RX ORDER — PHENYLEPHRINE HCL IN 0.9% NACL 1 MG/10 ML
SYRINGE (ML) INTRAVENOUS AS NEEDED
Status: DISCONTINUED | OUTPATIENT
Start: 2024-10-29 | End: 2024-10-29 | Stop reason: SURG

## 2024-10-29 RX ORDER — ONDANSETRON 2 MG/ML
4 INJECTION INTRAMUSCULAR; INTRAVENOUS ONCE
Status: DISCONTINUED | OUTPATIENT
Start: 2024-10-29 | End: 2024-10-29 | Stop reason: HOSPADM

## 2024-10-29 RX ORDER — FAMOTIDINE 10 MG/ML
20 INJECTION, SOLUTION INTRAVENOUS
Status: COMPLETED | OUTPATIENT
Start: 2024-10-29 | End: 2024-10-29

## 2024-10-29 RX ORDER — PROPOFOL 10 MG/ML
VIAL (ML) INTRAVENOUS CONTINUOUS PRN
Status: DISCONTINUED | OUTPATIENT
Start: 2024-10-29 | End: 2024-10-29 | Stop reason: SURG

## 2024-10-29 RX ORDER — DEXAMETHASONE SODIUM PHOSPHATE 4 MG/ML
INJECTION, SOLUTION INTRA-ARTICULAR; INTRALESIONAL; INTRAMUSCULAR; INTRAVENOUS; SOFT TISSUE AS NEEDED
Status: DISCONTINUED | OUTPATIENT
Start: 2024-10-29 | End: 2024-10-29 | Stop reason: SURG

## 2024-10-29 RX ORDER — MIDAZOLAM HYDROCHLORIDE 2 MG/2ML
INJECTION, SOLUTION INTRAMUSCULAR; INTRAVENOUS AS NEEDED
Status: DISCONTINUED | OUTPATIENT
Start: 2024-10-29 | End: 2024-10-29 | Stop reason: SURG

## 2024-10-29 RX ORDER — BUPIVACAINE HYDROCHLORIDE 7.5 MG/ML
INJECTION, SOLUTION EPIDURAL; RETROBULBAR
Status: COMPLETED | OUTPATIENT
Start: 2024-10-29 | End: 2024-10-29

## 2024-10-29 RX ORDER — BUPIVACAINE HYDROCHLORIDE 2.5 MG/ML
INJECTION, SOLUTION EPIDURAL; INFILTRATION; INTRACAUDAL
Status: DISCONTINUED | OUTPATIENT
Start: 2024-10-29 | End: 2024-10-29 | Stop reason: SURG

## 2024-10-29 RX ORDER — DOCUSATE SODIUM 100 MG/1
100 CAPSULE, LIQUID FILLED ORAL 2 TIMES DAILY PRN
Qty: 20 CAPSULE | Refills: 0 | Status: SHIPPED | OUTPATIENT
Start: 2024-10-29

## 2024-10-29 RX ORDER — HYDROCODONE BITARTRATE AND ACETAMINOPHEN 7.5; 325 MG/1; MG/1
1 TABLET ORAL EVERY 6 HOURS PRN
Qty: 28 TABLET | Refills: 0 | Status: SHIPPED | OUTPATIENT
Start: 2024-10-29

## 2024-10-29 RX ADMIN — PROPOFOL 140 MCG/KG/MIN: 10 INJECTION, EMULSION INTRAVENOUS at 08:09

## 2024-10-29 RX ADMIN — BUPIVACAINE HYDROCHLORIDE 50 ML: 2.5 INJECTION, SOLUTION EPIDURAL; INFILTRATION; INTRACAUDAL; PERINEURAL at 10:12

## 2024-10-29 RX ADMIN — BUPIVACAINE HYDROCHLORIDE 2 ML: 7.5 INJECTION, SOLUTION EPIDURAL; RETROBULBAR at 08:02

## 2024-10-29 RX ADMIN — TRANEXAMIC ACID 1000 MG: 10 INJECTION, SOLUTION INTRAVENOUS at 10:06

## 2024-10-29 RX ADMIN — DEXAMETHASONE SODIUM PHOSPHATE 4 MG: 4 INJECTION, SOLUTION INTRA-ARTICULAR; INTRALESIONAL; INTRAMUSCULAR; INTRAVENOUS; SOFT TISSUE at 08:18

## 2024-10-29 RX ADMIN — Medication 100 MCG: at 08:39

## 2024-10-29 RX ADMIN — Medication 100 MCG: at 08:33

## 2024-10-29 RX ADMIN — MIDAZOLAM HYDROCHLORIDE 2 MG: 1 INJECTION, SOLUTION INTRAMUSCULAR; INTRAVENOUS at 08:09

## 2024-10-29 RX ADMIN — FENTANYL CITRATE 50 MCG: 50 INJECTION, SOLUTION INTRAMUSCULAR; INTRAVENOUS at 08:09

## 2024-10-29 RX ADMIN — Medication 100 MCG: at 09:00

## 2024-10-29 RX ADMIN — TRANEXAMIC ACID 1000 MG: 10 INJECTION, SOLUTION INTRAVENOUS at 08:09

## 2024-10-29 RX ADMIN — Medication 100 MCG: at 08:52

## 2024-10-29 RX ADMIN — Medication 100 MCG: at 08:43

## 2024-10-29 RX ADMIN — SODIUM CHLORIDE 2000 MG: 9 INJECTION, SOLUTION INTRAVENOUS at 14:00

## 2024-10-29 RX ADMIN — Medication 100 MCG: at 08:27

## 2024-10-29 RX ADMIN — FAMOTIDINE 20 MG: 10 INJECTION, SOLUTION INTRAVENOUS at 06:48

## 2024-10-29 RX ADMIN — LIDOCAINE HYDROCHLORIDE 40 MG: 20 INJECTION, SOLUTION INTRAVENOUS at 08:09

## 2024-10-29 RX ADMIN — SODIUM CHLORIDE, POTASSIUM CHLORIDE, SODIUM LACTATE AND CALCIUM CHLORIDE: 600; 310; 30; 20 INJECTION, SOLUTION INTRAVENOUS at 07:51

## 2024-10-29 RX ADMIN — SODIUM CHLORIDE 2 G: 9 INJECTION, SOLUTION INTRAVENOUS at 07:51

## 2024-10-29 RX ADMIN — ONDANSETRON 4 MG: 2 INJECTION INTRAMUSCULAR; INTRAVENOUS at 08:18

## 2024-10-29 NOTE — ANESTHESIA PROCEDURE NOTES
Peripheral Block      Patient reassessed immediately prior to procedure    Start time: 10/29/2024 10:03 AM  Stop time: 10/29/2024 10:12 AM  Reason for block: at surgeon's request and post-op pain management  Preanesthetic Checklist  Completed: patient identified, IV checked, site marked, risks and benefits discussed, surgical consent, monitors and equipment checked, pre-op evaluation and timeout performed  Prep:  Pt Position: supine  Sterile barriers:cap, gloves and mask  Prep: ChloraPrep  Patient monitoring: EKG, continuous pulse oximetry and blood pressure monitoring  Procedure    Sedation: yes    Guidance:ultrasound guided  Images:still images not obtained    Laterality:left  Block Type:fascia iliaca compartment  Injection Technique:single-shot  Needle Type:echogenic  Needle Gauge:21 G      Medications Used: bupivacaine PF (MARCAINE) injection 0.25% - Injection   50 mL - 10/29/2024 10:12:00 AM      Medications  Comment:Adjuncts per total volume of LA:    Decadron 10 mg PSF      If required, intravenous sedation was given -- see meds on anesthesia record.    Post Assessment  Injection Assessment: negative aspiration for heme, no paresthesia on injection and incremental injection  Patient Tolerance:comfortable throughout block  Complications:no  Additional Notes  Procedure:          FASCIA ILIACA BLOCK                                Patient analgesia was achieved with SAB      Pt was placed in the supine position.   The insertion site was prepped in sterile fashion with Chloraprep.  A BBraun echogenic needle was advance In-plane under ultrasound guidance. The Anterior superior Iliac crest was initially visualized and the probe was directed slightly medially and slightly towards the umbilicus.  The course of the needle was tracked over the sartorius muscle through the fascia Iliacus and into the anterior portion of the Iliacus muscle.  Major vessels where identified and avoided as were structures of the peritoneal  cavity.  LA injection was made incrementally in 1-5 ml amounts and spread was visualized superiorly below the fascia iliacus.  Injection was completed with negative aspiration of blood and negative intravascular injection.  Injection pressures were normal or minimal resistance.    Performed by: Roderick Rangel CRNA

## 2024-10-29 NOTE — INTERVAL H&P NOTE
H&P reviewed. The patient was examined and there are no changes to the H&P.    Vitals:    10/29/24 0630   BP: 140/92   Pulse: 79   Resp: 16   Temp: 98.3 °F (36.8 °C)   SpO2: 94%       Yoni Medina MD  10/29/2024

## 2024-10-29 NOTE — PLAN OF CARE
"Goal Outcome Evaluation:  Plan of Care Reviewed With: patient, spouse        Progress: no change  Outcome Evaluation: Pt participated in PT initial evaluation this date s/p L NELDA with Dr. Medina. Pt is WBAT LLE w/ posterior hip precautions. Pt presents supine in bed with wife at side, pleasant and agreeable to PT evaluation. Pt AOx4, reports 4/10 L hip pain at rest. Pt reports at baseline, he was (I) with all household/community mobility without AD, states he would occasionally use a SPC. Pt is currently working FT. Pt denies reports of falls at home. Reviewed precautions with pt and spouse. On initial attempt to see pt, he was unable to perform STS on 2 attempts with max A d/t significant R (unaffected) LE and stated his RLE felt like a \"wet noodle\". Pt was returned to supine with mod (I), hip abd pillow in place and nursing notified. On second attempt to see pt, he performed supine >sit on EOB with mod (I), STS with CGA and use of a RW and ambulated x100' with RW and CGA. Pt demonstrated FF posture, decreased iván, increased WBing through UE's onto RW and antalgic gait pattern with decreased WBing through LLE. Following evaluation, pt left seated on EOB with call light and all needs within reach, spouse at side. Notified nursing. Provided pt with HEP and reviewed safety precautions with pt and spouse including use of gait belt as needed. Recommend pt to d/c home with support from wife and HHPT to address post-op rehab for L NELDA.    Anticipated Discharge Disposition (PT): home with assist, home with home health                        "

## 2024-10-29 NOTE — ANESTHESIA PROCEDURE NOTES
Spinal Block    Pre-sedation assessment completed: 10/29/2024 8:00 AM    Patient reassessed immediately prior to procedure    Patient location during procedure: OR  Start Time: 10/29/2024 8:02 AM  Stop Time: 10/29/2024 8:09 AM  Indication:at surgeon's request and procedure for pain  Performed By  CRNA/CAA: Dinesh King, CRNA  Preanesthetic Checklist  Completed: patient identified, IV checked, site marked, risks and benefits discussed, surgical consent, monitors and equipment checked, pre-op evaluation and timeout performed  Spinal Block Prep:  Patient Position:sitting  Sterile Tech:cap, gloves, mask and sterile barriers  Prep:Chloraprep  Patient Monitoring:blood pressure monitoring, continuous pulse oximetry and EKG    Spinal Block Procedure  Approach:midline  Guidance:landmark technique and palpation technique  Location:L4-L5  Needle Type:Quincke  Needle Gauge:22 G  Placement of Spinal needle event:cerebrospinal fluid aspirated  Paresthesia: no  Fluid Appearance:clear  Medications: bupivacaine PF (MARCAINE) injection 0.75% - Intrathecal   2 mL - 10/29/2024 8:02:00 AM   Post Assessment  Patient Tolerance:patient tolerated the procedure well with no apparent complications  Complications no  Additional Notes  Risks discussed , including but not limited to:  Headache, itching, n&v, infection, failure, decreased blood pressure, permanent/chronic back pain, nerve damage, paralysis, etc. All questions answered and informed consent obtained. Skin localized with lidocaine skin wheel. 2 ml 0.75% Marcaine with dextrose intrathecal

## 2024-10-29 NOTE — ANESTHESIA POSTPROCEDURE EVALUATION
Patient: Dom Marin    Procedure Summary       Date: 10/29/24 Room / Location: [facility] OR  /  [facility] OR    Anesthesia Start: 0751 Anesthesia Stop: 1051    Procedure: Elective left total hip arthroplasty (BIOMET) (Left: Hip) Diagnosis:       Pre-op testing      (Pre-op testing [Z01.818])    Surgeons: Yoni Medina MD Provider: Dinesh King CRNA    Anesthesia Type: spinal ASA Status: 3            Anesthesia Type: spinal    Vitals  No vitals data found for the desired time range.          Post Anesthesia Care and Evaluation    Patient location during evaluation: PACU  Patient participation: complete - patient participated  Level of consciousness: awake and alert  Pain score: 0  Pain management: satisfactory to patient    Airway patency: patent  Anesthetic complications: No anesthetic complications  PONV Status: none  Cardiovascular status: acceptable and stable  Respiratory status: acceptable and spontaneous ventilation  Hydration status: acceptable    Comments: Vitals signs as noted in nursing documentation as per protocol.

## 2024-10-29 NOTE — CASE MANAGEMENT/SOCIAL WORK
Case Management/Social Work    Patient Name:  Dom Marin  YOB: 1965  MRN: [id number]  Admit Date:  10/29/2024        SW received RW order and HH order. SW spoke with pts spouse via telephone. She will be the one transporting pt home today. She states pt does not have a walker and needs one. SW provided spouse with RW through [employer]. Pt/family has no preference for HH agency. ROZINA sent referral to [facility]. ROZINA following.     13:11 EDT [facility] [place] states they are out of network with pts insurance. ROZINA spoke with Nella/Jacquelyn who states they cover [place] She will call this SW back to see if can accept. ROZINA following.       Electronically signed by:  KOLE Samson  10/29/24 12:46 EDT

## 2024-10-29 NOTE — ANESTHESIA PREPROCEDURE EVALUATION
Anesthesia Evaluation     NPO Solid Status: > 8 hours  NPO Liquid Status: > 8 hours           Airway   Mallampati: II  TM distance: >3 FB  Neck ROM: full  Possible difficult intubation  Dental - normal exam     Pulmonary - normal exam   (+) a smoker Former, Abstained day of surgery, cigarettes,sleep apnea on CPAP  Cardiovascular - normal exam    (+) hypertension well controlled less than 2 medications, hyperlipidemia      Neuro/Psych  GI/Hepatic/Renal/Endo    (+) obesity, diabetes mellitus type 2, thyroid problem hypothyroidism    Musculoskeletal     Abdominal  - normal exam   Substance History      OB/GYN          Other                    Anesthesia Plan    ASA 3     spinal     intravenous induction     Anesthetic plan, risks, benefits, and alternatives have been provided, discussed and informed consent has been obtained with: patient.  Pre-procedure education provided  Plan discussed with CRNA.    CODE STATUS:

## 2024-10-29 NOTE — THERAPY DISCHARGE NOTE
Patient Name: Dom Marin  : 1965    MRN: [id number]                              Today's Date: 10/29/2024       Admit Date: 10/29/2024    Visit Dx:     ICD-10-CM ICD-9-CM   1. Status post total hip replacement, left  Z96.642 V43.64   2. Pre-op testing  Z01.818 V72.84     Patient Active Problem List   Diagnosis    Hyperlipidemia LDL goal <130    MILDRED (obstructive sleep apnea)    Type 2 diabetes mellitus with circulatory disorder, without long-term current use of insulin    Essential hypertension    History of shingles    Acquired hypothyroidism    Arthritis of left hip    Status post total hip replacement, left     Past Medical History:   Diagnosis Date    Arthritis May, 2022    Left hip    Colon polyp     Diabetes mellitus     Fracture     Hyperlipidemia     Hypertension     Hypothyroidism     L-thyroxine    Impaired mobility     Obesity     Sleep apnea     no CPAP     Past Surgical History:   Procedure Laterality Date    COLONOSCOPY      TONSILLECTOMY        General Information       Row Name 10/29/24 1520          Physical Therapy Time and Intention    Document Type discharge evaluation/summary  -     Mode of Treatment physical therapy  -       Row Name 10/29/24 1520          General Information    Patient Profile Reviewed yes  -     Prior Level of Function independent:;all household mobility;community mobility  uses a SPC as needed  -     Existing Precautions/Restrictions fall;hip, posterior;left;weight bearing;other (see comments)  LLE WBAT; posterior hip precautions  -     Barriers to Rehab none identified  -       Row Name 10/29/24 1520          Living Environment    People in Home spouse  -       Row Name 10/29/24 1520          Home Main Entrance    Number of Stairs, Main Entrance one  -     Stair Railings, Main Entrance none  -       Row Name 10/29/24 1520          Stairs Within Home, Primary    Number of Stairs, Within Home, Primary none  -       Row Name 10/29/24 1520           Cognition    Orientation Status (Cognition) oriented x 4  -       Row Name 10/29/24 1520          Safety Issues/Impairments Affecting Functional Mobility    Impairments Affecting Function (Mobility) pain;strength;endurance/activity tolerance;balance;postural/trunk control;range of motion (ROM)  -               User Key  (r) = Recorded By, (t) = Taken By, (c) = Cosigned By      Initials Name Provider Type     Isabel Renner PT Physical Therapist                   Mobility       Row Name 10/29/24 1521          Bed Mobility    Bed Mobility supine-sit  -     Supine-Sit [place] (Bed Mobility) modified independence  -     Assistive Device (Bed Mobility) head of bed elevated  -       Row Name 10/29/24 1521          Sit-Stand Transfer    Sit-Stand Troup (Transfers) contact guard  -     Assistive Device (Sit-Stand Transfers) walker, front-wheeled  -       Row Name 10/29/24 1521          Gait/Stairs (Locomotion)    Troup Level (Gait) contact guard  -     Assistive Device (Gait) walker, front-wheeled  -     Patient was able to Ambulate yes  -     Distance in Feet (Gait) 100  -     Deviations/Abnormal Patterns (Gait) bilateral deviations;iván decreased;festinating/shuffling;base of support, narrow;gait speed decreased;antalgic  -     Left Sided Gait Deviations weight shift ability decreased  -     Maintains Weight-bearing Status (Gait) able to maintain  -     Troup Level (Stairs) not tested  -       Row Name 10/29/24 1525          Mobility    Extremity Weight-bearing Status left lower extremity  -     Left Lower Extremity (Weight-bearing Status) weight-bearing as tolerated (WBAT);other (see comments)  w/ posterior hip precautions  -               User Key  (r) = Recorded By, (t) = Taken By, (c) = Cosigned By      Initials Name Provider Type     Isabel Renner PT Physical Therapist                   Obj/Interventions       Row Name 10/29/24 1529           Range of Motion Comprehensive    General Range of Motion lower extremity range of motion deficits identified  -     Comment, General Range of Motion RLE WFL; LLE knee/ankle WFL- L hip assessment limited by pain  -[facility] Name 10/29/24 1521          Strength Comprehensive (MMT)    General Manual Muscle Testing (MMT) Assessment lower extremity strength deficits identified  -     Comment, General Manual Muscle Testing (MMT) Assessment RLE WFL; LLE knee/ankle grossly 3+/5  L hip NT d/t WBing/hip precautions  -[facility] Name 10/29/24 1525          Balance    Balance Assessment sitting static balance;sitting dynamic balance;standing static balance;standing dynamic balance  -     Static Sitting Balance independent  -     Dynamic Sitting Balance independent  -     Position, Sitting Balance unsupported;sitting edge of bed  -     Static Standing Balance contact guard  -     Dynamic Standing Balance contact guard  -     Position/Device Used, Standing Balance supported;walker, front-wheeled  -[facility] Name 10/29/24 1525          Sensory Assessment (Somatosensory)    Sensory Assessment (Somatosensory) LE sensation intact  -               User Key  (r) = Recorded By, (t) = Taken By, (c) = Cosigned By      Initials Name Provider Type     Isabel Renner PT Physical Therapist                   Goals/Plan    No documentation.                  Clinical Impression       [facility] Name 10/29/24 1526          Pain    Pretreatment Pain Rating 4/10  -     Posttreatment Pain Rating 4/10  -     Pain Location hip  -     Pain Side/Orientation left  -     Pain Management Interventions exercise or physical activity utilized;activity modification encouraged;positioning techniques utilized  -     Response to Pain Interventions activity participation with tolerable pain;activity level improved;mobility function improved;functional ability improved  -[facility] Name 10/29/24 1526          Plan of Care Review     "Plan of Care Reviewed With patient;spouse  -     Progress no change  -     Outcome Evaluation Pt participated in PT initial evaluation this date s/p L NELDA with Dr. Medina. Pt is WBAT LLE w/ posterior hip precautions. Pt presents supine in bed with wife at side, pleasant and agreeable to PT evaluation. Pt AOx4, reports 4/10 L hip pain at rest. Pt reports at baseline, he was (I) with all household/community mobility without AD, states he would occasionally use a SPC. Pt is currently working FT. Pt denies reports of falls at home. Reviewed precautions with pt and spouse. On initial attempt to see pt, he was unable to perform STS on 2 attempts with max A d/t significant R (unaffected) LE and stated his RLE felt like a \"wet noodle\". Pt was returned to supine with mod (I), hip abd pillow in place and nursing notified. On second attempt to see pt, he performed supine >sit on EOB with mod (I), STS with CGA and use of a RW and ambulated x100' with RW and CGA. Pt demonstrated FF posture, decreased iván, increased WBing through UE's onto RW and antalgic gait pattern with decreased WBing through LLE. Following evaluation, pt left seated on EOB with call light and all needs within reach, spouse at side. Notified nursing. Provided pt with HEP and reviewed safety precautions with pt and spouse including use of gait belt as needed. Recommend pt to d/c home with support from wife and HHPT to address post-op rehab for L NELDA.  -       Row Name 10/29/24 1526          Therapy Assessment/Plan (PT)    Criteria for Skilled Interventions Met (PT) no problems identified which require skilled intervention;does not meet criteria for skilled intervention  -     Therapy Frequency (PT) evaluation only  -       Row Name 10/29/24 1526          Vital Signs    Pre Systolic BP Rehab 141  -HW     Pre Treatment Diastolic BP 86  -HW     Pretreatment Heart Rate (beats/min) 72  -HW     Pre SpO2 (%) 93  -HW     O2 Delivery Pre Treatment room " air  -HW     O2 Delivery Intra Treatment room air  -HW     O2 Delivery Post Treatment room air  -HW     Pre Patient Position Supine  -HW     Intra Patient Position Standing  -HW     Post Patient Position Sitting  -       Row Name 10/29/24 1526          Positioning and Restraints    Pre-Treatment Position in bed  -HW     Post Treatment Position bed  -HW     In Bed sitting EOB;call light within reach;encouraged to call for assist;patient within staff view;notified nsg;with family/caregiver  -               User Key  (r) = Recorded By, (t) = Taken By, (c) = Cosigned By      Initials Name Provider Type    Isabel Ybarra PT Physical Therapist                   Outcome Measures       Row Name 10/29/24 1541          How much help from another person do you currently need...    Turning from your back to your side while in flat bed without using bedrails? 4  -HW     Moving from lying on back to sitting on the side of a flat bed without bedrails? 4  -HW     Moving to and from a bed to a chair (including a wheelchair)? 3  -HW     Standing up from a chair using your arms (e.g., wheelchair, bedside chair)? 3  -HW     Climbing 3-5 steps with a railing? 3  -HW     To walk in hospital room? 3  -HW     AM-PAC 6 Clicks Score (PT) 20  -HW     Highest Level of Mobility Goal 6 --> Walk 10 steps or more  -       Row Name 10/29/24 1541          Functional Assessment    Outcome Measure Options AM-PAC 6 Clicks Basic Mobility (PT)  -               User Key  (r) = Recorded By, (t) = Taken By, (c) = Cosigned By      Initials Name Provider Type    Isabel Ybarra PT Physical Therapist                  Physical Therapy Education       Title: PT OT SLP Therapies (In Progress)       Topic: Physical Therapy (In Progress)       Point: Mobility training (Done)       Learning Progress Summary            Patient Acceptance, E,TB, VU by  at 10/29/2024 1541    Comment: role of PT post-op                      Point: Home exercise program (Not  "Started)       Learner Progress:  Not documented in this visit.              Point: Body mechanics (Not Started)       Learner Progress:  Not documented in this visit.              Point: Precautions (Done)       Learning Progress Summary            Patient Acceptance, E,TB, VU by  at 10/29/2024 1542    Comment: WBAT LLE w/ posterior hip precautions   Family Acceptance, E,TB, VU by  at 10/29/2024 1542    Comment: WBAT LLE w/ posterior hip precautions                                      User Key       Initials Effective Dates Name Provider Type Discipline     11/29/23 -  Isabel Renner, PT Physical Therapist PT                  PT Recommendation and Plan     Progress: no change  Outcome Evaluation: Pt participated in PT initial evaluation this date s/p L NELDA with Dr. Medina. Pt is WBAT LLE w/ posterior hip precautions. Pt presents supine in bed with wife at side, pleasant and agreeable to PT evaluation. Pt AOx4, reports 4/10 L hip pain at rest. Pt reports at baseline, he was (I) with all household/community mobility without AD, states he would occasionally use a SPC. Pt is currently working FT. Pt denies reports of falls at home. Reviewed precautions with pt and spouse. On initial attempt to see pt, he was unable to perform STS on 2 attempts with max A d/t significant R (unaffected) LE and stated his RLE felt like a \"wet noodle\". Pt was returned to supine with mod (I), hip abd pillow in place and nursing notified. On second attempt to see pt, he performed supine >sit on EOB with mod (I), STS with CGA and use of a RW and ambulated x100' with RW and CGA. Pt demonstrated FF posture, decreased iván, increased WBing through UE's onto RW and antalgic gait pattern with decreased WBing through LLE. Following evaluation, pt left seated on EOB with call light and all needs within reach, spouse at side. Notified nursing. Provided pt with HEP and reviewed safety precautions with pt and spouse including use of gait belt " as needed. Recommend pt to d/c home with support from wife and HHPT to address post-op rehab for L NELDA.     Time Calculation:   PT Evaluation Complexity  History, PT Evaluation Complexity: 1-2 personal factors and/or comorbidities  Examination of Body Systems (PT Eval Complexity): 1-2 elements  Clinical Presentation (PT Evaluation Complexity): stable  Clinical Decision Making (PT Evaluation Complexity): low complexity  Overall Complexity (PT Evaluation Complexity): low complexity     PT Charges       Row Name 10/29/24 1542             Time Calculation    Start Time 1235  -HW      PT Received On 10/29/24  -HW         Untimed Charges    PT Eval/Re-eval Minutes 62  -HW         Total Minutes    Untimed Charges Total Minutes 62  -HW       Total Minutes 62  -HW                User Key  (r) = Recorded By, (t) = Taken By, (c) = Cosigned By      Initials Name Provider Type    HW Isabel Renner, ALEXANDER Physical Therapist                  Therapy Charges for Today       Code Description Service Date Service Provider Modifiers Qty    63377460614 HC PT EVAL LOW COMPLEXITY 4 10/29/2024 Isabel Renner PT GP 1            PT G-Codes  Outcome Measure Options: AM-PAC 6 Clicks Basic Mobility (PT)  AM-PAC 6 Clicks Score (PT): 20    PT Discharge Summary  Anticipated Discharge Disposition (PT): home with assist, home with home health    Isabel Renner PT  10/29/2024

## 2024-10-29 NOTE — OP NOTE
[place] [address] [facility] [address]  [place]  [address] [phone]      OPERATIVE REPORT         PATIENT NAME:  Dom Marin                            YOB: 1965        PREOP DIAGNOSIS:  Left hip advanced end-stage osteoarthritis    POSTOP DIAGNOSIS:  Left hip advanced end-stage osteoarthritis    PROCEDURE:  Left total hip replacement.    Surgical Approach:   Hip Posterior - Lateral    SURGEON:   Hu Medina MD    OPERATIVE TEAM:  Circulator: Danna Boateng RN; Daniel Glass RN; Nazia Adamson RN  Scrub Person: Segunod Serrano; Tod Richard; Ac Zaldivar    ANESTHETIST:  CRNA: Dinesh King CRNA    ANESTHESIA:  Spinal    FLUIDS:   800 ml crystalloid    ESTIMATED BLOOD LOSS: 250 ml      URINE OUTPUT:  400 ml    SPECIMENS:   Femoral head sent to Pathology.    DRAINS:   Marin to gravity.    FINDINGS:   Severe degenerative arthritis, osteophytes, erosions, cysts, deformity of femoral head, deformity and migration of acetabulum, calcified labrum and bone on bone wear.    HARDWARE:                       [employer] Taperloc Complete  press fit total hip replacement with a #8 stem, 133 degree neck  angle with standard offset, +0 mm neck, 36 mm ceramic head, 54  mm acetabular cup with one superior quadrant screw (30 mm) and  10 degree posterior high wall cross-linked polyethylene liner.      COMPLICATIONS:  None.    DISPOSITION:  Stable to recovery.    INDICATIONS AND NARRATIVE:  The patient presents for planned total hip replacement.  The patient has persistent daily pain, limited ambulation and activity intolerance, hip stiffness, deformity, and limitations related to advanced degenerative joint disease of the hip.  Treatment alternatives have been discussed, and the patient wishes to proceed with elective total hip replacement.  Risks and benefits of the proposed procedure have been discussed, and informed consent obtained.  Risks were  discussed including but not limited to, anesthesia, infection, nerve/vessel/tendon injury, fracture, prosthetic wear, loosening or dislocation, DVT, pulmonary embolus, blood loss and the possible need for transfusion.  Arrangements have been made for tranexamic acid IV protocol.  Goals of the procedure include the potential for pain relief, improved hip motion, limb alignment and improved tolerance with ambulation and activities.      Antibiotic prophylaxis was given.  Surgeon site marking and a time out were performed.  Anesthesia was effective and well tolerated.  The hip and leg were prepped and draped in the usual sterile fashion.  The patient was placed in the lateral decubitus position for the procedure, with care taken to pad all areas of the body including a bean bag mold, axillary roll, and fibular head and malleolar padding.      After sterile prep and drape, a curved incision was made centered on the greater trochanter of the hip.  Dissection proceeded in line with the skin incision and through the tensor fascia shanae.  A Charnley self-retaining retractor was placed.  The hip was gently internally rotated and a blunt Cobra was placed under the gluteus medius.  The piriformis tendon and short external rotators were released from the fossa and tagged for subsequent repair.  A T capsulotomy was performed and the capsule was tagged for repair.  Synovial joint fluid expressed and suctioned.  There were marked degenerative changes, cartilage worn down to bone and a deformed femoral head and acetabulum.  Using the neck-cutting template, with the desired slope and position, a neck cut was made with a saw.  The femoral head was extracted from the hip, and sent to pathology as a specimen.      After debridement of the osteophytes, reaming of the acetabulum was performed that provided a concentric acetabular socket for the implant.  A trial cup provided an excellent press fit with no toggling.  Care was taken to ream  and place the cup in 15-20 degrees of anteversion and 45-50 degrees of inclination.  The trial component was removed, and the acetabulum was irrigated copiously.  Then, the actual acetabular shell was impacted in place and obtained an excellent press fit, with good position.  The fixation was reinforced with superior quadrant screws, with standard drilling depth gauge measurement, and placement of screws.  Then, a trial liner was placed onto the cup.    Next, steps were taken to prepare the femur.  A box-cutting osteotome was used to lateralize the entry to the canal and along the greater trochanter.  Sequential broaching with the broach was performed, up to the best-fit sizes, which provided an excellent press fit, no toggling.  Care was taken to broach 15-20 degrees of femoral anteversion.  Next, trial heads and necks were placed to find the best range of motion and stability.  There was smooth, free flexion, full extension of the hip and smooth full external and internal rotation with stability.  The trial components were removed.  The wound and bone surfaces were pulse irrigated with copious antibiotic solution, and then suctioned.  Next, the actual stem was placed, which had an excellent press fit that was in good position.  The trial cup liner was removed and the actual insert was placed.  The actual head was then Young tapered onto the femoral stem.  A final reduction was performed.  Clinically the leg lengths were equal and there was full range of motion and stability of the hip.  The wound was irrigated copiously.      Routine closure was performed and consisted of interrupted #1 Vicryl to repair the capsule, #5 non-absorbable Ethibond to repair the piriformis tendon, #1 Vicryl to repair the tensor fascia shanae, 2-0 Vicryl for the deep and superficial subcutaneous layers, and staples for the skin.  A sterile Aquacel dressing was applied.  An abduction pillow was placed and the patient was moved supine on the  hospital bed.  Anesthesia was effective and well tolerated.  There were no complications of surgery.  The patient was transferred in stable condition to the recovery room and x-rays of the pelvis and hip were requested.

## 2024-10-30 LAB — REF LAB TEST METHOD: NORMAL

## 2024-10-30 NOTE — CASE MANAGEMENT/SOCIAL WORK
Case Management/Social Work    Patient Name:  Dom Marin  YOB: 1965  MRN: [id number]  Admit Date:  10/29/2024        SW spoke with [facility] who accepted pt and they plan to start services today.       Electronically signed by:  KOLE Samson  10/30/24 09:43 EDT

## 2024-11-06 DIAGNOSIS — Z96.642 STATUS POST HIP REPLACEMENT, LEFT: Primary | ICD-10-CM

## 2024-11-06 RX ORDER — HYDROCODONE BITARTRATE AND ACETAMINOPHEN 7.5; 325 MG/1; MG/1
1 TABLET ORAL EVERY 8 HOURS PRN
Qty: 21 TABLET | Refills: 0 | Status: SHIPPED | OUTPATIENT
Start: 2024-11-06

## 2024-11-12 ENCOUNTER — OFFICE VISIT (OUTPATIENT)
Dept: ORTHOPEDIC SURGERY | Facility: CLINIC | Age: 59
End: 2024-11-12
Payer: OTHER GOVERNMENT

## 2024-11-12 VITALS
BODY MASS INDEX: 33.21 KG/M2 | TEMPERATURE: 97.5 F | SYSTOLIC BLOOD PRESSURE: 142 MMHG | HEIGHT: 69 IN | WEIGHT: 224.2 LBS | DIASTOLIC BLOOD PRESSURE: 84 MMHG

## 2024-11-12 DIAGNOSIS — Z96.642 STATUS POST HIP REPLACEMENT, LEFT: Primary | ICD-10-CM

## 2024-11-12 PROCEDURE — 99024 POSTOP FOLLOW-UP VISIT: CPT | Performed by: STUDENT IN AN ORGANIZED HEALTH CARE EDUCATION/TRAINING PROGRAM

## 2024-11-12 RX ORDER — HYDROCODONE BITARTRATE AND ACETAMINOPHEN 7.5; 325 MG/1; MG/1
1 TABLET ORAL EVERY 12 HOURS PRN
Qty: 14 TABLET | Refills: 0 | Status: SHIPPED | OUTPATIENT
Start: 2024-11-12

## 2024-11-12 NOTE — PROGRESS NOTES
"    Post Op Note     Name: Dom Marin    : 1965     MRN: [id number]     Chief Complaint  Post-op of the Left Hip (Elective left total hip arthroplasty on 10/29/24)    Subjective     History of Present Illness:  Dom Marin is a 58 y.o. male who presents today for 2 week post op f/u s/p left NELDA.     Review of Systems   Constitutional:  Negative for fever.   HENT:  Negative for dental problem and voice change.    Eyes:  Negative for visual disturbance.   Respiratory:  Negative for shortness of breath.    Cardiovascular:  Negative for chest pain.   Gastrointestinal:  Negative for abdominal pain.   Genitourinary:  Negative for dysuria.   Musculoskeletal:  Positive for arthralgias. Negative for gait problem and joint swelling.   Skin:  Negative for rash.   Neurological:  Negative for speech difficulty.   Hematological:  Does not bruise/bleed easily.   Psychiatric/Behavioral:  Negative for confusion.         Pain controlled: [] no   [x] yes   Medication refill requested: [x] no   [] yes    Patient compliant with instructions: [] no   [x] yes   Other: Reports excellent progress since surgery.     Past Medical History:   Diagnosis Date    Arthritis May, 2022    Left hip    Colon polyp     Diabetes mellitus     Fracture     Hyperlipidemia     Hypertension     Hypothyroidism     L-thyroxine    Impaired mobility     Obesity     Sleep apnea     no CPAP        Past Surgical History:   Procedure Laterality Date    COLONOSCOPY      TONSILLECTOMY      TOTAL HIP ARTHROPLASTY Left 10/29/2024    Procedure: Elective left total hip arthroplasty;  Surgeon: Yoni Medina MD;  Location: [facility];  Service: Orthopedics;  Laterality: Left;       No Known Allergies    Objective   /84   Temp 97.5 °F (36.4 °C)   Ht 175.3 cm (69\")   Wt 102 kg (224 lb 3.2 oz)   BMI 33.11 kg/m²             Signs of infection: [x] no                    [] yes   Drainage: [x] no                    [] yes   Incision: [x] " healing well     []healed well   Motor exam intact: [] no                    [x] yes   Neurovascular exam intact: [] no                    [x] yes   Signs of compartment syndrome: [x] no                    [] yes   Signs of DVT: [x] no                    [] yes   Other: Firmness around incision, no signs of infection, no drainage or erythema.       Physical Exam  Ortho Exam    Extremity DVT signs are negative by clinical screen.    dependent Review of Radiographic Studies:    No new imaging done today.    Laboratory and Other Studies:  No new results reviewed today.     Medical Decision Making:    Stable post-operative exam and expected early progress.    Procedures    Assessment and Plan     Diagnoses and all orders for this visit:    1. Status post hip replacement, left (Primary)  -     Ambulatory Referral to Physical Therapy for Evaluation & Treatment        Recommendations/Plan:     Sutures Staples or Pins [x] Removed today  [] At prior visit  [] Plan removal later   Physical therapy: []rehab facility  [x]outpatient referral  [] therapy ongoing   Ultrasound: [x]not ordered         []order given to patient   Labs: [x]not ordered         []order given to patient   Weight Bearing status: []Full [x]WBAT []PWB []NWB []Other     Discussion of orthopaedic goals and activities and patient and/or guardian expressed appreciation.  Regular exercise as tolerated  Guided on proper techniques for mobility, strength, agility and/or conditioning exercises  Weight bearing parameters reviewed  Take prescribed medications as instructed only as tolerated     Exercise, medications, injections, other patient advice, and return appointment as noted.  Brace: No brace was given at today's visit.  Referral: Physical and Occupational Therapy referral.  Test/Studies: No additional studies ordered at this time.  Work/Activity Status: no strenuous activity. WBAT with assistive device.      Return in about 4 weeks (around 12/10/2024) for  XOA.  Patient is encouraged and agreeable to call or return sooner for any issues or concerns.  Answers submitted by the patient for this visit:  Other (Submitted on 11/12/2024)  Please describe your symptoms.: Follow up left hip replacement  Have you had these symptoms before?: No  How long have you been having these symptoms?: 1-2 weeks  Please list any medications you are currently taking for this condition.: Hydro codone, Eliquis  Please describe any probable cause for these symptoms. : Surgery  Primary Reason for Visit (Submitted on 11/12/2024)  What is the primary reason for your visit?: Problem Not Listed

## 2024-12-09 DIAGNOSIS — Z96.642 STATUS POST HIP REPLACEMENT, LEFT: Primary | ICD-10-CM

## 2024-12-10 ENCOUNTER — OFFICE VISIT (OUTPATIENT)
Dept: ORTHOPEDIC SURGERY | Facility: CLINIC | Age: 59
End: 2024-12-10
Payer: OTHER GOVERNMENT

## 2024-12-10 VITALS
BODY MASS INDEX: 33.47 KG/M2 | WEIGHT: 226 LBS | SYSTOLIC BLOOD PRESSURE: 126 MMHG | DIASTOLIC BLOOD PRESSURE: 80 MMHG | TEMPERATURE: 98.4 F | HEIGHT: 69 IN

## 2024-12-10 DIAGNOSIS — Z96.642 STATUS POST HIP REPLACEMENT, LEFT: Primary | ICD-10-CM

## 2024-12-10 PROCEDURE — 99024 POSTOP FOLLOW-UP VISIT: CPT | Performed by: STUDENT IN AN ORGANIZED HEALTH CARE EDUCATION/TRAINING PROGRAM

## 2024-12-10 NOTE — PROGRESS NOTES
Office Note     Name: Dom Marin    : 1965     MRN: [id number]     Chief Complaint  Post-op of the Left Hip (Left total hip replacement on 10/29/24. He is doing a lot better, going to outpatient PT three times weekly. )    Subjective     History of Present Illness:  Dom Marin is a 59 y.o. male presenting today for 6-week follow-up status post left hip total hip replacement.  Patient states that he is doing very well he notes that he only has occasional minimal pain with certain motions.  He is currently in outpatient physical therapy and doing well.  He does request to return to work.  Patient works as a manager at the [employer] and the most strenuous work he has to do is prolonged walking.    Review of Systems   Constitutional:  Negative for fever.   HENT:  Negative for dental problem and voice change.    Eyes:  Negative for visual disturbance.   Respiratory:  Negative for shortness of breath.    Cardiovascular:  Negative for chest pain.   Gastrointestinal:  Negative for abdominal pain.   Genitourinary:  Negative for dysuria.   Musculoskeletal:  Positive for arthralgias. Negative for gait problem and joint swelling.   Skin:  Negative for rash.   Neurological:  Negative for speech difficulty.   Hematological:  Does not bruise/bleed easily.   Psychiatric/Behavioral:  Negative for confusion.         Past Medical History:   Diagnosis Date    Arthritis May, 2022    Left hip    Colon polyp     Diabetes mellitus     Fracture     Hyperlipidemia     Hypertension     Hypothyroidism     L-thyroxine    Impaired mobility     Obesity     Sleep apnea     no CPAP        Past Surgical History:   Procedure Laterality Date    COLONOSCOPY      TONSILLECTOMY      TOTAL HIP ARTHROPLASTY Left 10/29/2024    Procedure: Elective left total hip arthroplasty;  Surgeon: Yoni Medina MD;  Location: [facility];  Service: Orthopedics;  Laterality: Left;       Family History   Problem Relation Age of Onset     "Diabetes Mother     Hyperlipidemia Mother     Cancer Father     Hypertension Father        Social History     Socioeconomic History    Marital status:    Tobacco Use    Smoking status: Former     Current packs/day: 0.00     Average packs/day: 1.1 packs/day for 31.2 years (35.1 ttl pk-yrs)     Types: Cigarettes     Start date: 10/15/1984     Quit date: 2008     Years since quittin.8    Smokeless tobacco: Former     Types: Snuff     Quit date: 2008   Vaping Use    Vaping status: Never Used   Substance and Sexual Activity    Alcohol use: Yes     Alcohol/week: 17.0 standard drinks of alcohol     Types: 12 Cans of beer, 5 Drinks containing 0.5 oz of alcohol per week    Drug use: No    Sexual activity: Not Currently     Partners: Female     Birth control/protection: Post-menopausal         Current Outpatient Medications:     Ascorbic Acid (VITAMIN C) 500 MG capsule, Take  by mouth., Disp: , Rfl:     B Complex Vitamins (VITAMIN B COMPLEX PO), Take  by mouth., Disp: , Rfl:     rosuvastatin (CRESTOR) 20 MG tablet, TAKE 1 TABLET EVERY NIGHT TO LOWER CHOLESTEROL AND RISK OF STROKE, Disp: 90 tablet, Rfl: 3    Semaglutide (Rybelsus) 14 MG tablet, Take 1 tablet by mouth Every Morning., Disp: 90 tablet, Rfl: 1    levothyroxine (Synthroid) 75 MCG tablet, Take 1 tablet by mouth Every Morning for 28 days., Disp: 28 tablet, Rfl: 0    No Known Allergies        Objective   /80   Temp 98.4 °F (36.9 °C)   Ht 175.3 cm (69\")   Wt 103 kg (226 lb)   BMI 33.37 kg/m²            Physical Exam  Left Hip Exam     Tenderness   The patient is experiencing no tenderness.     Range of Motion   Abduction:  30   Adduction:  20   Extension:  0   Flexion:  120   External rotation:  40   Internal rotation: 20     Muscle Strength   The patient has normal left hip strength.     Other   Erythema: absent  Sensation: normal  Pulse: present    Comments:  Incision healing appropriately with no signs of infection.         "   Extremity DVT signs are negative by clinical screen.     Independent Review of Radiographic Studies:    No new imaging done today.    Procedures    Assessment and Plan   Diagnoses and all orders for this visit:    1. Status post hip replacement, left (Primary)       Discussion of orthopedic goals  Orthopedic activities reviewed and patient expressed appreciation  Regular exercise as tolerated  Risk, benefits, and merits of treatment alternatives reviewed with the patient and questions answered  Patient guided on mobility and conditioning exercises  Ice, heat, and/or modalities as beneficial  Physical therapy ongoing  Weight bearing parameters reviewed  Assistive device encouraged for safety and support    Recommendations/Plan:  Exercise, medications, injections, other patient advice, and return appointment as noted.  Patient is encouraged to call or return for any issues or concerns.    Return in about 6 weeks (around 1/21/2025) for Recheck.  Patient agreeable to call or return sooner for any concerns.  Answers submitted by the patient for this visit:  Primary Reason for Visit (Submitted on 12/10/2024)  What is the primary reason for your visit?: Extremity Pain  Lower Extremity Injury Questionnaire (Submitted on 12/10/2024)  Chief Complaint: Extremity pain  Injury: No  Pain location: left hip  Pain quality: stabbing  Pain - numeric: 2/10  Progression since onset: improved  tingling: No  inability to bear weight: No  lower extremity swelling: No  redness: No

## 2024-12-19 ENCOUNTER — PATIENT MESSAGE (OUTPATIENT)
Dept: INTERNAL MEDICINE | Facility: CLINIC | Age: 59
End: 2024-12-19
Payer: OTHER GOVERNMENT

## 2024-12-19 ENCOUNTER — TELEPHONE (OUTPATIENT)
Dept: ORTHOPEDIC SURGERY | Facility: CLINIC | Age: 59
End: 2024-12-19
Payer: OTHER GOVERNMENT

## 2024-12-19 DIAGNOSIS — Z96.642 STATUS POST HIP REPLACEMENT, LEFT: Primary | ICD-10-CM

## 2024-12-19 DIAGNOSIS — Z96.641 STATUS POST RIGHT HIP REPLACEMENT: Primary | ICD-10-CM

## 2024-12-19 NOTE — TELEPHONE ENCOUNTER
"    Caller: Dom Marin \"Kirt\"    Relationship: Self    Best call back number: [phone]    What form or medical record are you requesting: PHYSICAL THERAPY ORDER LEFT HIP     Who is requesting this form or medical record from you: PATIENT     How would you like to receive the form or medical records (pick-up, mail, fax): FAX     Timeframe paperwork needed: ASAP     Additional notes: USING LAST VISIT 12/20/2024- WOULD LIKE MORE VISITS SENT TO [address] IN [place] P#[phone]    "

## 2024-12-30 ENCOUNTER — OFFICE VISIT (OUTPATIENT)
Dept: INTERNAL MEDICINE | Facility: CLINIC | Age: 59
End: 2024-12-30
Payer: OTHER GOVERNMENT

## 2024-12-30 VITALS
DIASTOLIC BLOOD PRESSURE: 86 MMHG | SYSTOLIC BLOOD PRESSURE: 122 MMHG | HEIGHT: 69 IN | HEART RATE: 68 BPM | WEIGHT: 230 LBS | TEMPERATURE: 97.1 F | OXYGEN SATURATION: 96 % | BODY MASS INDEX: 34.07 KG/M2

## 2024-12-30 DIAGNOSIS — E66.811 CLASS 1 OBESITY DUE TO EXCESS CALORIES WITH SERIOUS COMORBIDITY AND BODY MASS INDEX (BMI) OF 33.0 TO 33.9 IN ADULT: ICD-10-CM

## 2024-12-30 DIAGNOSIS — I10 ESSENTIAL HYPERTENSION: ICD-10-CM

## 2024-12-30 DIAGNOSIS — E03.9 ACQUIRED HYPOTHYROIDISM: ICD-10-CM

## 2024-12-30 DIAGNOSIS — R79.9 ABNORMAL BLOOD CHEMISTRY: ICD-10-CM

## 2024-12-30 DIAGNOSIS — Z00.00 ANNUAL PHYSICAL EXAM: Primary | ICD-10-CM

## 2024-12-30 DIAGNOSIS — Z12.11 COLON CANCER SCREENING: ICD-10-CM

## 2024-12-30 DIAGNOSIS — E66.09 CLASS 1 OBESITY DUE TO EXCESS CALORIES WITH SERIOUS COMORBIDITY AND BODY MASS INDEX (BMI) OF 33.0 TO 33.9 IN ADULT: ICD-10-CM

## 2024-12-30 DIAGNOSIS — E11.59 TYPE 2 DIABETES MELLITUS WITH OTHER CIRCULATORY COMPLICATION, WITHOUT LONG-TERM CURRENT USE OF INSULIN: ICD-10-CM

## 2024-12-30 DIAGNOSIS — Z12.5 PROSTATE CANCER SCREENING: ICD-10-CM

## 2024-12-30 DIAGNOSIS — E78.5 HYPERLIPIDEMIA LDL GOAL <100: ICD-10-CM

## 2024-12-30 DIAGNOSIS — Z51.81 MEDICATION MONITORING ENCOUNTER: ICD-10-CM

## 2024-12-30 PROCEDURE — 99396 PREV VISIT EST AGE 40-64: CPT | Performed by: FAMILY MEDICINE

## 2024-12-30 RX ORDER — CIDER VINEGAR 300 MG
TABLET ORAL
COMMUNITY
Start: 2024-10-31

## 2024-12-30 RX ORDER — ORAL SEMAGLUTIDE 14 MG/1
14 TABLET ORAL EVERY MORNING
Qty: 90 TABLET | Refills: 3 | Status: SHIPPED | OUTPATIENT
Start: 2024-12-30

## 2024-12-30 NOTE — PATIENT INSTRUCTIONS
Health Maintenance, Male  Adopting a healthy lifestyle and getting preventive care are important in promoting health and wellness. Ask your health care provider about:  The right schedule for you to have regular tests and exams.  Things you can do on your own to prevent diseases and keep yourself healthy.    What should I know about diet, weight, and exercise?  Eat a healthy diet    Eat a diet that includes plenty of vegetables, fruits, low-fat dairy products, and lean protein.  Do not eat a lot of foods that are high in solid fats, added sugars, or sodium.    Maintain a healthy weight  Body mass index (BMI) is a measurement that can be used to identify possible weight problems. It estimates body fat based on height and weight. Your health care provider can help determine your BMI and help you achieve or maintain a healthy weight.    Get regular exercise  Get regular exercise. This is one of the most important things you can do for your health. Most adults should:  Exercise for at least 150 minutes each week. The exercise should increase your heart rate and make you sweat (moderate-intensity exercise).  Do strengthening exercises at least twice a week. This is in addition to the moderate-intensity exercise.  Spend less time sitting. Even light physical activity can be beneficial.    Watch cholesterol and blood lipids  Have your blood tested for lipids and cholesterol at 20 years of age, then have this test every 5 years.  You may need to have your cholesterol levels checked more often if:  Your lipid or cholesterol levels are high.  You are older than 40 years of age.  You are at high risk for heart disease.    What should I know about cancer screening?  Many types of cancers can be detected early and may often be prevented. Depending on your health history and family history, you may need to have cancer screening at various ages. This may include screening for:  Colorectal cancer.  Prostate cancer.  Skin  cancer.  Lung cancer.    What should I know about heart disease, diabetes, and high blood pressure?  Blood pressure and heart disease  High blood pressure causes heart disease and increases the risk of stroke. This is more likely to develop in people who have high blood pressure readings or are overweight.  Talk with your health care provider about your target blood pressure readings.  Have your blood pressure checked:  Every 3-5 years if you are 18-39 years of age.  Every year if you are 40 years old or older.  If you are between the ages of 65 and 75 and are a current or former smoker, ask your health care provider if you should have a one-time screening for abdominal aortic aneurysm (AAA).    Diabetes  Have regular diabetes screenings. This checks your fasting blood sugar level. Have the screening done:  Once every three years after age 45 if you are at a normal weight and have a low risk for diabetes.  More often and at a younger age if you are overweight or have a high risk for diabetes.    What should I know about preventing infection?  Hepatitis B  If you have a higher risk for hepatitis B, you should be screened for this virus. Talk with your health care provider to find out if you are at risk for hepatitis B infection.  Hepatitis C  Blood testing is recommended for:  All adults aged 18 to 79 years  Anyone with known risk factors for hepatitis C.  Sexually transmitted infections (STIs)  You should be screened each year for STIs, including gonorrhea and chlamydia, if:  You are sexually active and are younger than 24 years of age.  You are older than 24 years of age and your health care provider tells you that you are at risk for this type of infection.  Your sexual activity has changed since you were last screened, and you are at increased risk for chlamydia or gonorrhea. Ask your health care provider if you are at risk.  Ask your health care provider about whether you are at high risk for HIV. Your health  care provider may recommend a prescription medicine to help prevent HIV infection. If you choose to take medicine to prevent HIV, you should first get tested for HIV. You should then be tested every 3 months for as long as you are taking the medicine.    Follow these instructions at home:  Alcohol use  Do not drink alcohol if your health care provider tells you not to drink.  If you drink alcohol:  Limit how much you have to 0-2 drinks a day.  Know how much alcohol is in your drink. In the [place] one drink equals one 12 oz bottle of beer (355 mL), one 5 oz glass of wine (148 mL), or one 1½ oz glass of hard liquor (44 mL).  Lifestyle  Do not use any products that contain nicotine or tobacco. These products include cigarettes, chewing tobacco, and vaping devices, such as e-cigarettes. If you need help quitting, ask your health care provider.  Do not use street drugs.  Do not share needles.  Ask your health care provider for help if you need support or information about quitting drugs.    General instructions  Schedule regular health, dental, and eye exams.  Stay current with your vaccines.  Tell your health care provider if:  You often feel depressed.  You have ever been abused or do not feel safe at home.    Summary  Adopting a healthy lifestyle and getting preventive care are important in promoting health and wellness.  Follow your health care provider's instructions about healthy diet, exercising, and getting tested or screened for diseases.  Follow your health care provider's instructions on monitoring your cholesterol and blood pressure.    This information is not intended to replace advice given to you by your health care provider. Make sure you discuss any questions you have with your health care provider.  Document Revised: 05/09/2022 Document Reviewed: 05/09/2022  [employer] Patient Education © 2023 [employer]  Updated 2/29/24 tc      Preventing Unhealthy Weight Gain, Adult  Staying at a healthy weight is  important to your overall health. When fat builds up in your body, you may become overweight or obese. Being overweight or obese increases your risk of developing various health problems.  Unhealthy weight gain is often the result of making unhealthy food choices or not getting enough exercise. You can make changes to your lifestyle to prevent obesity and stay as healthy as possible.  How can unhealthy weight gain affect me?  Being overweight or obese can cause you to develop joint or bone problems, which can make it hard for you to stay active or do activities you enjoy. Being overweight also puts stress on your heart and lungs and can lead to health problems such as:  Diabetes.  Heart disease.  Some types of cancer.  Stroke.  Eating healthy, staying active, and having healthy habits can help to prevent unhealthy weight gain and lower your risk for health problems. These lifestyle changes will also help you manage stress and emotions, improve your self-esteem, and connect with friends and family.  What can increase my risk?  In addition to certain eating and lifestyle choices, some other factors that may make you more likely to have unhealthy weight gain include:  Having a family history of obesity.  Living in an area with limited access to:  [place], recreation centers, or sidewalks.  Healthy food choices, such as grocery stores and "[employer] markets.  What actions can I take to prevent unhealthy weight gain?  Nutrition  A plate with examples of foods in a healthy diet.      Eat only as much as your body needs. To do this:  Pay attention to signs that you are hungry or full. Stop eating as soon as you feel full.  If you feel hungry, try drinking water first before eating. Drink enough water so your urine is pale yellow.  Eat smaller portions. Pay attention to portion sizes when eating out.  Look at serving sizes on food labels. Most foods contain more than one serving per container.  Eat the recommended number of  calories for your gender and activity level. For most active people, a daily total of 2,000 calories is appropriate. If you are trying to lose weight or are not very active, you may need to eat fewer calories. Talk with your health care provider or a dietitian about how many calories you need each day.  Choose healthy foods, such as:  Fruits and vegetables. At each meal, try to fill at least half of your plate with fruits and vegetables.  Whole grains, such as whole-wheat bread, brown rice, and quinoa.  Lean meats, such as chicken or fish.  Other healthy proteins, such as beans, eggs, or tofu.  Healthy fats, such as nuts, seeds, fatty fish, and olive oil.  Low-fat or fat-free dairy products.  Check food labels, and avoid food and drinks that:  Are high in calories.  Have added sugar.  Are high in sodium.  Have saturated fats or trans fats.  Cook foods in healthier ways, such as by baking, broiling, or grilling.  Make a meal plan for the week, and shop with a grocery list to help you stay on track with your purchases. Try to avoid going to the grocery store when you are hungry.  When grocery shopping, try to shop around the outside of the store first, where the fresh foods are. Doing this helps you avoid prepackaged foods, which can be high in sugar, salt (sodium), and fat.  Lifestyle  A person riding a bicycle wearing a safety helmet.      Exercise for 30 or more minutes on 5 or more days each week. Exercising may include brisk walking, yard work, biking, running, swimming, and team sports like basketball and soccer. Ask your health care provider which exercises are safe for you.  Do activities that strengthen the muscles, such as lifting weights or using resistance bands, on 2 or more days a week.  Do not use any products that contain nicotine or tobacco. These products include cigarettes, chewing tobacco, and vaping devices, such as e-cigarettes. If you need help quitting, ask your health care provider.  If you  drink alcohol:  Limit how much you have to:  0-1 drink a day for women who are not pregnant.  0-2 drinks a day for men.  Know how much alcohol is in a drink. In the [place] one drink equals one 12 oz bottle of beer (355 mL), one 5 oz glass of wine (148 mL), or one 1½ oz glass of hard liquor (44 mL).  Try to get 7-9 hours of sleep each night.  Other changes  Keep a food and activity journal to keep track of:  What you ate and how many calories you had. Remember to count the calories in sauces, dressings, and side dishes.  Whether you were active, and what exercises you did.  Your calorie, weight, and activity goals.  Check your weight regularly. Track any changes. If you notice that you have gained weight, make changes to your diet or activity routine.  Avoid taking weight-loss medicines or supplements. Talk to your health care provider before starting any new medicine or supplement.  Talk to your health care provider before trying any new diet or exercise plan.  Where to find more information  Talk with your health care provider or a dietitian about healthy eating and healthy lifestyle choices. You may also find information from:  [place] Department of Agriculture, MyPlate: www.choosemyplate.gov  American Heart Association: www.heart.org  Centers for Disease Control and Prevention: www.cdc.gov  Summary  Eating healthy, staying active, and having healthy habits can help to prevent unhealthy weight gain and lower your risk for health problems such as heart disease, diabetes, some types of cancer, and stroke.  Being overweight or obese can cause you to develop joint or bone problems, which can make it hard for you to stay active or do activities you enjoy.  You can prevent unhealthy weight gain by eating a healthy diet, exercising regularly, not smoking, limiting alcohol, and getting enough sleep.  Talk with your health care provider or a dietitian for guidance about healthy eating and healthy lifestyle choices.  This  information is not intended to replace advice given to you by your health care provider. Make sure you discuss any questions you have with your health care provider.  Document Revised: 07/15/2022 Document Reviewed: 07/15/2022  [employer] Patient Education © 2022 [employer]

## 2024-12-30 NOTE — PROGRESS NOTES
12/30/2024  Chief Complaint   Patient presents with    Annual Exam       Patient Care Team:  Suma Zurita MD as PCP - General (Family Medicine)  Fatoumata Tracy APRN as Nurse Practitioner (Nurse Practitioner)  Yoni Medina MD as Surgeon (Orthopedic Surgery)  Alban Avalos PA-C as Physician Assistant (Orthopedic Surgery)]       Dom Marin is here for his annual preventive exam. History per MA reviewed.     HPI       Health Maintenance   Topic Date Due    DIABETIC EYE EXAM  Never done    DIABETIC FOOT EXAM  06/08/2022    PROSTATE CANCER SCREENING  11/21/2024    ANNUAL PHYSICAL  11/21/2024    LIPID PANEL  11/21/2024    COLORECTAL CANCER SCREENING  01/01/2025    URINE MICROALBUMIN  01/17/2025 (Originally 11/21/2024)    COVID-19 Vaccine (6 - 2024-25 season) 03/21/2025 (Originally 9/1/2024)    INFLUENZA VACCINE  03/31/2025 (Originally 7/1/2024)    ZOSTER VACCINE (1 of 2) 10/30/2025 (Originally 11/26/2014)    HEMOGLOBIN A1C  04/14/2025    BMI FOLLOWUP  12/30/2025    TDAP/TD VACCINES (2 - Td or Tdap) 02/18/2030    HEPATITIS C SCREENING  Completed    Pneumococcal Vaccine 0-64  Completed    LUNG CANCER SCREENING  Discontinued       Immunization History   Administered Date(s) Administered    COVID-19 (MODERNA) 1st,2nd,3rd Dose Monovalent 01/28/2021, 03/09/2021, 11/22/2021, 04/13/2022    COVID-19 (PFIZER) BIVALENT 12+YRS 11/16/2022    Fluzone (or Fluarix & Flulaval for VFC) >6mos 09/09/2021, 11/21/2023    Influenza, Unspecified 10/03/2022    Pneumococcal Conjugate 20-Valent (PCV20) 04/13/2022    Pneumococcal Polysaccharide (PPSV23) 06/08/2021    Tdap 02/18/2020    flucelvax quad pfs =>4 YRS 02/18/2020         The following portions of the patient's history were reviewed and updated as appropriate: allergies, current medications, past family history, past medical history, past social history, past surgical history and problem list.    Objective   Visit Vitals  /86   Pulse 68   Temp 97.1 °F  "(36.2 °C)   Ht 175.3 cm (69\")   Wt 104 kg (230 lb)   SpO2 96%   BMI 33.97 kg/m²              Physical Exam  Vitals and nursing note reviewed.   Constitutional:       General: He is not in acute distress.     Appearance: Normal appearance. He is well-developed and well-groomed. He is obese. He is not ill-appearing, toxic-appearing or diaphoretic.   HENT:      Head: Normocephalic and atraumatic.      Right Ear: Hearing, tympanic membrane, ear canal and external ear normal.      Left Ear: Hearing, tympanic membrane, ear canal and external ear normal.      Nose: Nose normal.      Mouth/Throat:      Mouth: Mucous membranes are moist.   Eyes:      General: Lids are normal. Gaze aligned appropriately. No scleral icterus.        Right eye: No discharge.         Left eye: No discharge.      Extraocular Movements: Extraocular movements intact.      Conjunctiva/sclera: Conjunctivae normal.      Pupils: Pupils are equal, round, and reactive to light.   Neck:      Thyroid: No thyromegaly.      Trachea: Phonation normal.   Cardiovascular:      Rate and Rhythm: Normal rate and regular rhythm.      Heart sounds: Normal heart sounds.   Pulmonary:      Effort: Pulmonary effort is normal.      Breath sounds: Normal breath sounds and air entry.   Abdominal:      General: Bowel sounds are normal. There is no distension.      Palpations: Abdomen is soft.      Tenderness: There is no abdominal tenderness. There is no guarding or rebound.   Musculoskeletal:         General: No deformity or signs of injury.      Cervical back: Neck supple.   Skin:     General: Skin is warm.      Capillary Refill: Capillary refill takes less than 2 seconds.      Coloration: Skin is not cyanotic, jaundiced or pale.      Findings: No rash.   Neurological:      General: No focal deficit present.      Mental Status: He is alert and oriented to person, place, and time. Mental status is at baseline.      Cranial Nerves: No dysarthria.      Motor: No tremor, " abnormal muscle tone or seizure activity.      Gait: Gait is intact.   Psychiatric:         Attention and Perception: Attention and perception normal.         Mood and Affect: Mood and affect normal.         Speech: Speech normal.         Behavior: Behavior normal. Behavior is cooperative.         Thought Content: Thought content normal.         Cognition and Memory: Cognition and memory normal.         Judgment: Judgment normal.         Lab Results   Component Value Date    CHLPL 114 11/21/2023    TRIG 154 (H) 11/21/2023    HDL 47 11/21/2023    LDL 41 11/21/2023     Lab Results   Component Value Date    TSH 1.110 11/21/2023     Lab Results   Component Value Date    HGBA1C 5.90 (H) 10/14/2024    HGBA1C 5.8 (A) 09/06/2024    HGBA1C 5.6 06/04/2024     Component      Latest Ref Rng 11/21/2023   25 Hydroxy, Vitamin D      30.0 - 100.0 ng/ml 190.0 (H)       Lab Results   Component Value Date    PSA 0.191 11/21/2023    PSA 0.262 11/16/2022    PSA 0.192 06/08/2021        Assessment   Diagnoses and all orders for this visit:    1. Annual physical exam (Primary)  -     Hemoglobin A1c  -     Lipid Panel  -     CBC (No Diff)  -     TSH Rfx On Abnormal To Free T4  -     Vitamin D,25-Hydroxy  -     Comprehensive Metabolic Panel  -     Vitamin B12 & Folate    2. Type 2 diabetes mellitus with other circulatory complication, without long-term current use of insulin  -     Hemoglobin A1c  -     Lipid Panel  -     Comprehensive Metabolic Panel  -     Vitamin B12 & Folate  -     Semaglutide (Rybelsus) 14 MG tablet; Take 1 tablet by mouth Every Morning.  Dispense: 90 tablet; Refill: 3    3. Hyperlipidemia LDL goal <100  -     Lipid Panel  -     Comprehensive Metabolic Panel    4. Essential hypertension  -     CBC (No Diff)  -     TSH Rfx On Abnormal To Free T4  -     Comprehensive Metabolic Panel    5. Class 1 obesity due to excess calories with serious comorbidity and body mass index (BMI) of 33.0 to 33.9 in  adult  Comments:  associated with hypertension, hyperlipidemia, diabetes  Orders:  -     Lipid Panel  -     Vitamin D,25-Hydroxy  -     Comprehensive Metabolic Panel    6. Acquired hypothyroidism  -     CBC (No Diff)  -     TSH Rfx On Abnormal To Free T4    7. Abnormal blood chemistry  -     Hemoglobin A1c  -     Lipid Panel  -     CBC (No Diff)  -     TSH Rfx On Abnormal To Free T4  -     Vitamin D,25-Hydroxy  -     Comprehensive Metabolic Panel  -     Vitamin B12 & Folate    8. Medication monitoring encounter  -     Hemoglobin A1c  -     Lipid Panel  -     CBC (No Diff)  -     TSH Rfx On Abnormal To Free T4  -     Vitamin D,25-Hydroxy  -     Comprehensive Metabolic Panel  -     Vitamin B12 & Folate    9. Prostate cancer screening  -     PSA Screen    10. Colon cancer screening  -     Ambulatory Referral For Screening Colonoscopy         Health maintenance information provided via patient plan (after visit summary).   Counseled on age appropriate health screenings and immunizations.  Encouraged eye exam. Defer microalbumin testing until next visit.  Encouraged exercise and healthy diet.    BMI is >= 30 and <35. (Class 1 Obesity). The following options were offered after discussion;: Information on healthy weight added to patient's after visit summary.      Return in about 6 months (around 6/30/2025) for Diabetes follow up, A1C in office.     Suma Zurita MD

## 2024-12-31 ENCOUNTER — TELEPHONE (OUTPATIENT)
Dept: SURGERY | Facility: CLINIC | Age: 59
End: 2024-12-31
Payer: OTHER GOVERNMENT

## 2024-12-31 DIAGNOSIS — E78.5 HYPERLIPIDEMIA LDL GOAL <100: ICD-10-CM

## 2024-12-31 DIAGNOSIS — E11.59 TYPE 2 DIABETES MELLITUS WITH OTHER CIRCULATORY COMPLICATION, WITHOUT LONG-TERM CURRENT USE OF INSULIN: ICD-10-CM

## 2024-12-31 LAB
25(OH)D3+25(OH)D2 SERPL-MCNC: 35.2 NG/ML (ref 30–100)
ALBUMIN SERPL-MCNC: 4.1 G/DL (ref 3.5–5.2)
ALBUMIN/GLOB SERPL: 1.3 G/DL
ALP SERPL-CCNC: 101 U/L (ref 39–117)
ALT SERPL-CCNC: 16 U/L (ref 1–41)
AST SERPL-CCNC: 16 U/L (ref 1–40)
BILIRUB SERPL-MCNC: 0.6 MG/DL (ref 0–1.2)
BUN SERPL-MCNC: 13 MG/DL (ref 8–23)
BUN/CREAT SERPL: 14.6 (ref 7–25)
CALCIUM SERPL-MCNC: 8.9 MG/DL (ref 8.6–10.5)
CHLORIDE SERPL-SCNC: 105 MMOL/L (ref 98–107)
CHOLEST SERPL-MCNC: 121 MG/DL (ref 0–200)
CO2 SERPL-SCNC: 24.5 MMOL/L (ref 22–29)
CREAT SERPL-MCNC: 0.89 MG/DL (ref 0.76–1.27)
EGFRCR SERPLBLD CKD-EPI 2021: 98.1 ML/MIN/1.73
ERYTHROCYTE [DISTWIDTH] IN BLOOD BY AUTOMATED COUNT: 12.2 % (ref 12.3–15.4)
FOLATE SERPL-MCNC: 7.9 NG/ML (ref 4.78–24.2)
GLOBULIN SER CALC-MCNC: 3.2 GM/DL
GLUCOSE SERPL-MCNC: 110 MG/DL (ref 65–99)
HBA1C MFR BLD: 5.7 % (ref 4.8–5.6)
HCT VFR BLD AUTO: 47.3 % (ref 37.5–51)
HDLC SERPL-MCNC: 51 MG/DL (ref 40–60)
HGB BLD-MCNC: 14.8 G/DL (ref 13–17.7)
LDLC SERPL CALC-MCNC: 53 MG/DL (ref 0–100)
MCH RBC QN AUTO: 29.7 PG (ref 26.6–33)
MCHC RBC AUTO-ENTMCNC: 31.3 G/DL (ref 31.5–35.7)
MCV RBC AUTO: 94.8 FL (ref 79–97)
PLATELET # BLD AUTO: 249 10*3/MM3 (ref 140–450)
POTASSIUM SERPL-SCNC: 4.5 MMOL/L (ref 3.5–5.2)
PROT SERPL-MCNC: 7.3 G/DL (ref 6–8.5)
PSA SERPL-MCNC: 0.19 NG/ML (ref 0–4)
RBC # BLD AUTO: 4.99 10*6/MM3 (ref 4.14–5.8)
SODIUM SERPL-SCNC: 141 MMOL/L (ref 136–145)
TRIGL SERPL-MCNC: 86 MG/DL (ref 0–150)
TSH SERPL DL<=0.005 MIU/L-ACNC: 0.92 UIU/ML (ref 0.27–4.2)
VIT B12 SERPL-MCNC: 505 PG/ML (ref 211–946)
VLDLC SERPL CALC-MCNC: 17 MG/DL (ref 5–40)
WBC # BLD AUTO: 10.43 10*3/MM3 (ref 3.4–10.8)

## 2024-12-31 RX ORDER — ROSUVASTATIN CALCIUM 20 MG/1
20 TABLET, COATED ORAL NIGHTLY
Qty: 90 TABLET | Refills: 3 | Status: SHIPPED | OUTPATIENT
Start: 2024-12-31

## 2025-01-03 NOTE — TELEPHONE ENCOUNTER
PRESCREENING FOR OPEN ACCESS SCHEDULING    Dom Marin, 1965  [id number]    01/03/25    If, the patient answers yes to any of the following questions the provider will be informed prior to scheduling open access for approval and documented in the chart.    [x]  Yes  [] No    1. Have you ever had a colonoscopy in the past?      When:  2015      Where:       Polyps or other:     []  Yes  [x] No    2. Family history of colon cancer?      Relation:       Age of onset:       Do you currently have any of the following?    []  Yes  [x] No  Rectal bleeding, if so, how long?     []  Yes  [x] No  Abdominal pain, if so, how long?    []  Yes  [x] No  Constipation, if so, how long?    []  Yes  [x] No  Diarrhea, if so, how long?    []  Yes  [x] No  Weight loss, is so, how much?    [] Yes  [x] No  Small caliber stool, if so, how long?    []Yes  [x] No  Do you have Hemorroids?    []Yes  [x] No  Have you been diagnosed with Anemia?    []Yes  [x] No  Do you have difficulty swallowing?  []Yes  [x] No  Do you have a history of esophageal stricture or dilation?(If yes do not schedule with Dr Metcalf)    []Yes  [x] No  Do you have acid reflux?    Have you ever had any of the following conditions?    [] Yes  [x] No  Heart attack?      When?       Last cardiac workup?     Current Cardiologist? NONE      Blood thinners?    [] Yes  [x] No   Lung problems, asthma or COPD?  [] Yes  [x] No  Oxygen required?       [] Yes  [x] No  Stroke?     [] Yes  [x] No  Have you ever had a reaction to anesthesia?             
Pt would like to be scheduled at [facility] On 02/25/25 W/ Dr. Edge-verified pharmacy/insurance. Thank you.   
S/w the pt regarding a referral we received from Dr. Zurita for a colonoscopy-pt was on his way to another apt and ask that I call back. Let him know I would call him back on 01/03/25, He understood. 1st attempt.   
30-Dec-2021 00:44

## 2025-01-08 RX ORDER — POLYETHYLENE GLYCOL 3350 17 G/17G
POWDER, FOR SOLUTION ORAL
Qty: 238 G | Refills: 0 | Status: SHIPPED | OUTPATIENT
Start: 2025-01-08

## 2025-01-08 RX ORDER — BISACODYL 5 MG/1
TABLET, DELAYED RELEASE ORAL
Qty: 4 TABLET | Refills: 0 | Status: SHIPPED | OUTPATIENT
Start: 2025-01-08

## 2025-01-20 ENCOUNTER — PREP FOR SURGERY (OUTPATIENT)
Dept: OTHER | Facility: HOSPITAL | Age: 60
End: 2025-01-20
Payer: OTHER GOVERNMENT

## 2025-01-20 DIAGNOSIS — Z12.11 SCREENING FOR COLON CANCER: Primary | ICD-10-CM

## 2025-01-21 ENCOUNTER — OFFICE VISIT (OUTPATIENT)
Dept: ORTHOPEDIC SURGERY | Facility: CLINIC | Age: 60
End: 2025-01-21
Payer: OTHER GOVERNMENT

## 2025-01-21 VITALS
HEIGHT: 69 IN | SYSTOLIC BLOOD PRESSURE: 124 MMHG | TEMPERATURE: 97.7 F | BODY MASS INDEX: 33.86 KG/M2 | DIASTOLIC BLOOD PRESSURE: 84 MMHG | WEIGHT: 228.6 LBS

## 2025-01-21 DIAGNOSIS — M16.11 PRIMARY OSTEOARTHRITIS OF RIGHT HIP: ICD-10-CM

## 2025-01-21 DIAGNOSIS — M25.551 RIGHT HIP PAIN: ICD-10-CM

## 2025-01-21 DIAGNOSIS — Z96.642 STATUS POST HIP REPLACEMENT, LEFT: Primary | ICD-10-CM

## 2025-01-21 PROBLEM — Z12.11 SCREENING FOR COLON CANCER: Status: ACTIVE | Noted: 2025-01-20

## 2025-01-21 RX ORDER — SODIUM CHLORIDE 0.9 % (FLUSH) 0.9 %
10 SYRINGE (ML) INJECTION AS NEEDED
OUTPATIENT
Start: 2025-01-21

## 2025-01-21 RX ORDER — SODIUM CHLORIDE, SODIUM LACTATE, POTASSIUM CHLORIDE, CALCIUM CHLORIDE 600; 310; 30; 20 MG/100ML; MG/100ML; MG/100ML; MG/100ML
50 INJECTION, SOLUTION INTRAVENOUS CONTINUOUS
OUTPATIENT
Start: 2025-01-21 | End: 2025-01-22

## 2025-01-21 NOTE — PROGRESS NOTES
Office Note     Name: Dom Marin    : 1965     MRN: [id number]     Chief Complaint  Post-op of the Left Hip (Left total hip replacement on 10/29/24. Patient states he is doing well. He is doing outpatient PT 1-2 times a week. )    Subjective     History of Present Illness:  Dom Marin is a 59 y.o. male presenting today for approximate 3-month postop follow-up status post left hip total hip replacement done on 10/29/2024.  Patient states that he is doing very well.  He denies any significant pain at this time.  He does continue to have slightly decreased range of motion in the left hip but states overall he is very pleased with his progress.  Denies any lower extremity paresthesias.    Concurrently he complains of right hip pain.  He states this pain feels very similar to the pain he used to have in his left hip.  The pain typically starts in his anterior right hip and radiates into his groin and towards the lateral aspect of his right hip.  His symptoms are exacerbated by prolonged walking and standing and alleviated by rest.  He has been in physical therapy for his left hip over the past couple of months but he notes that they are focusing more on his right hip as this is his problem area now.  Patient is requesting a right hip intra-articular hip injection for hopeful symptomatic relief.    Review of Systems   Constitutional:  Negative for fever.   HENT:  Negative for dental problem and voice change.    Eyes:  Negative for visual disturbance.   Respiratory:  Negative for shortness of breath.    Cardiovascular:  Negative for chest pain.   Gastrointestinal:  Negative for abdominal pain.   Genitourinary:  Negative for dysuria.   Musculoskeletal:  Positive for arthralgias. Negative for gait problem and joint swelling.   Skin:  Negative for rash.   Neurological:  Negative for speech difficulty.   Hematological:  Does not bruise/bleed easily.   Psychiatric/Behavioral:  Negative for confusion.          Past Medical History:   Diagnosis Date    Arthritis May, 2022    Left hip    Colon polyp     Diabetes mellitus     Fracture     Hyperlipidemia     Hypertension     Hypothyroidism     L-thyroxine    Impaired mobility     Obesity     Sleep apnea     no CPAP        Past Surgical History:   Procedure Laterality Date    COLONOSCOPY      JOINT REPLACEMENT  10/29/2024    Left hip    TONSILLECTOMY      TOTAL HIP ARTHROPLASTY Left 10/29/2024    Procedure: Elective left total hip arthroplasty;  Surgeon: Yoni Medina MD;  Location: [facility];  Service: Orthopedics;  Laterality: Left;       Family History   Problem Relation Age of Onset    Diabetes Mother     Hyperlipidemia Mother     Cancer Father     Hypertension Father        Social History     Socioeconomic History    Marital status:    Tobacco Use    Smoking status: Former     Current packs/day: 0.00     Average packs/day: 1.1 packs/day for 31.2 years (35.1 ttl pk-yrs)     Types: Cigarettes     Start date: 10/15/1984     Quit date: 2008     Years since quittin.9    Smokeless tobacco: Former     Types: Snuff     Quit date: 2008   Vaping Use    Vaping status: Never Used   Substance and Sexual Activity    Alcohol use: Yes     Alcohol/week: 17.0 standard drinks of alcohol     Types: 12 Cans of beer, 5 Drinks containing 0.5 oz of alcohol per week    Drug use: No    Sexual activity: Not Currently     Partners: Female     Birth control/protection: Post-menopausal         Current Outpatient Medications:     Ascorbic Acid (VITAMIN C) 500 MG capsule, Take  by mouth., Disp: , Rfl:     bisacodyl 5 MG EC tablet, Take 4 tablets at 1:00pm with 8oz of clear liquids the day before your colonoscopy., Disp: 4 tablet, Rfl: 0    Misc Natural Products (JOINT HEALTH PO), Take  by mouth., Disp: , Rfl:     Omega-3 Fatty Acids (Fish Oil) 1000 MG capsule delayed-release, 2000 mg DAILY (route: oral), Disp: , Rfl:     polyethylene glycol (MIRALAX) 17 GM/SCOOP  "powder, Mix 238g powder with 64 oz of clear liquid at 4:00pm. Drink 8 oz every 10-15 minutes until consumed., Disp: 238 g, Rfl: 0    rosuvastatin (CRESTOR) 20 MG tablet, Take 1 tablet by mouth Every Night. To lower cholesterol and risk of stroke., Disp: 90 tablet, Rfl: 3    Semaglutide (Rybelsus) 14 MG tablet, Take 1 tablet by mouth Every Morning., Disp: 90 tablet, Rfl: 3    TURMERIC PO, 100 mg DAILY (route: oral), Disp: , Rfl:     No Known Allergies        Objective   /84   Temp 97.7 °F (36.5 °C)   Ht 175.3 cm (69\")   Wt 104 kg (228 lb 9.6 oz)   BMI 33.76 kg/m²            Physical Exam  Right Hip Exam     Tenderness   The patient is experiencing tenderness in the anterior and lateral.    Range of Motion   Abduction:  normal   Adduction:  normal   Extension:  normal   Flexion:  normal   External rotation:  50   Internal rotation:  20     Muscle Strength   The patient has normal right hip strength.    Tests   YVES: positive  Alyson: positive  Fadir:  Positive FADIR test    Other   Erythema: absent  Sensation: normal  Pulse: present    Comments:  Mildly tender at the greater trochanter.  Alyson's test does slightly increase pain in the lateral hip.  Mild increase in anterior groin pain with YVES and FADIR.      Left Hip Exam     Tenderness   The patient is experiencing no tenderness.     Range of Motion   Abduction:  normal   Adduction:  normal   Extension:  normal   Flexion:  normal   External rotation:  60   Internal rotation: 25     Muscle Strength   The patient has normal left hip strength.     Tests   YVES: negative  Alyson: negative  Fadir:  Negative FADIR test    Other   Erythema: absent  Scars: present  Sensation: normal  Pulse: present           Extremity DVT signs are negative by clinical screen.     Independent Review of Radiographic Studies:    Reviewed relevant prior imaging with patient again today.    While we do not have dedicated plain films of the right hip, previous x-rays of his left hip do " include bilateral AP views.  There are mild to moderate degenerative changes seen in the right hip AP view including mild joint space narrowing without significant osteophyte formation.  The right hip also shows a cam shaped deformity.    Procedures    Assessment and Plan   Diagnoses and all orders for this visit:    1. Status post hip replacement, left (Primary)    2. Right hip pain  -     FL Guided Pain Management Large Joint    3. Primary osteoarthritis of right hip  -     FL Guided Pain Management Large Joint       Discussion of orthopedic goals  Orthopedic activities reviewed and patient expressed appreciation  Regular exercise as tolerated  Risk, benefits, and merits of treatment alternatives reviewed with the patient and questions answered  Patient guided on mobility and conditioning exercises  Ice, heat, and/or modalities as beneficial  Call or notify for any adverse effect from injection therapy  Physical therapy ongoing  Reduced physical activity as appropriate and avoid offending activity  Weight bearing parameters reviewed    Recommendations/Plan:  Exercise, medications, injections, other patient advice, and return appointment as noted.  Patient is encouraged to call or return for any issues or concerns.    Left hip:  Patient is doing excellent in regards to his left hip.  I encouraged him to continue with his physical therapy and home exercise plan once he transitions out of formal physical therapy.  I recommended 6-month postop follow-up for his left hip.    Right hip:  Patient is displaying signs and symptoms of osteoarthritis of the right hip as well as possible trochanteric bursitis.  Patient requests and is agreeable to an intra-articular right hip injection with fluoroscopy.  Patient is scheduled for later this month.  Advised we will attempt the injection to see if it provides symptomatic relief.  After his injection I advised him to follow-up with me as needed concerning his right hip.    Return  in about 3 months (around 4/21/2025) for 6-month postop.  Patient agreeable to call or return sooner for any concerns.  Answers submitted by the patient for this visit:  Post Operative Visit (Submitted on 1/21/2025)  Chief Complaint: Follow-up  Pain Control: no pain  Fever: no fever  Diet: adequate intake  Activity: normal  Operative Site Issues: No

## 2025-01-24 DIAGNOSIS — E03.9 ACQUIRED HYPOTHYROIDISM: ICD-10-CM

## 2025-01-24 RX ORDER — LEVOTHYROXINE SODIUM 75 UG/1
TABLET ORAL
Qty: 90 TABLET | Refills: 3 | OUTPATIENT
Start: 2025-01-24

## 2025-01-30 ENCOUNTER — HOSPITAL ENCOUNTER (OUTPATIENT)
Dept: GENERAL RADIOLOGY | Facility: HOSPITAL | Age: 60
Discharge: HOME | End: 2025-01-30
Payer: OTHER GOVERNMENT

## 2025-01-30 PROCEDURE — 25010000002 LIDOCAINE 1 % SOLUTION: Performed by: STUDENT IN AN ORGANIZED HEALTH CARE EDUCATION/TRAINING PROGRAM

## 2025-01-30 PROCEDURE — 77002 NEEDLE LOCALIZATION BY XRAY: CPT

## 2025-01-30 PROCEDURE — 25010000002 METHYLPREDNISOLONE PER 80 MG: Performed by: STUDENT IN AN ORGANIZED HEALTH CARE EDUCATION/TRAINING PROGRAM

## 2025-01-30 RX ORDER — METHYLPREDNISOLONE ACETATE 80 MG/ML
80 INJECTION, SUSPENSION INTRA-ARTICULAR; INTRALESIONAL; INTRAMUSCULAR; SOFT TISSUE ONCE
Status: COMPLETED | OUTPATIENT
Start: 2025-01-30 | End: 2025-01-30

## 2025-01-30 RX ORDER — LIDOCAINE HYDROCHLORIDE 10 MG/ML
5 INJECTION, SOLUTION INFILTRATION; PERINEURAL ONCE
Status: COMPLETED | OUTPATIENT
Start: 2025-01-30 | End: 2025-01-30

## 2025-01-30 RX ADMIN — LIDOCAINE HYDROCHLORIDE 5 ML: 10 INJECTION, SOLUTION INFILTRATION; PERINEURAL at 15:49

## 2025-01-30 RX ADMIN — METHYLPREDNISOLONE ACETATE 80 MG: 80 INJECTION, SUSPENSION INTRA-ARTICULAR; INTRALESIONAL; INTRAMUSCULAR; SOFT TISSUE at 15:49

## 2025-01-30 NOTE — POST-PROCEDURE NOTE
[facility]  [address], [address]  [place] [address]  [phone]        PROCEDURE REPORT        DIAGNOSIS:  Right hip osteoarthritis, symptomatic    PROCEDURE: Right  hip injection under flouroscopy      Dom Marin with date of birth 1965 presents to [facility] Radiology Department today for injection therapy.        Patient presents to [facility] Radiology Department Flouroscopy Suite on 1/30/2025 for planned elective right hip injection under flouroscopy for symptomatic osteoarthritis.    Procedure:     After consent was obtained, and using ethyl chloride topical local anesthetic, the right hip was then prepped and draped with sterile technique. With an anterior hip approach, flouroscopy guidance, and care to stay lateral of the femoral artery, the hip joint was entered via a 20 gauge spinal needle.  An image was saved of the needle within the hip joint space.  A mixture of 80 mg methylprednisolone in one ml plus 5 ml of 1% plain Lidocaine was injected and the needle withdrawn and a band aid applied. The procedure was well tolerated and without complication.  The patient did remain stable and with baseline ambulation. The patient is asked to avoid stressful physical activity for a day or two before resuming full regular activities.  There might be some soreness initially and patient to call for any adverse effect of the injection treatment.  Call or return to clinic if any fever, swelling, persistent pain, lack of anticipated improvement or other symptoms or concerns.    Impression: Symptomatic right hip osteoarthritis.      Recommendations/Plan:      Treatment and patient advice as noted here and in office visit report.  Orthopedic activities and goals reviewed and patient expressed appreciation.  Call or notify for any adverse effect from injection therapy.    Exercise: As tolerated.  No strenuous activity for a few days as appropriate.  Activity:  May  perform usual activities as tolerated.      Patient will return to our clinic at scheduled appointment.  Patient agreeable to call or return sooner for any concerns.

## 2025-02-19 NOTE — PRE-PROCEDURE INSTRUCTIONS
PAT phone history completed with patient for upcoming procedure on 2/25/25 with Dr. Edge.    PAT PASS reviewed with patient and they verbalize understanding of the following:     Do not eat or drink anything after midnight the night before procedure unless otherwise instructed by physician/surgeon's office, this includes no gum, candy, mints, tobacco products or e-cigarettes.  Do not shave the area to be operated on at least 48 hours prior to procedure.  Do not wear makeup, lotion, hair products, or nail polish.  Do not wear any jewelry and remove all piercings.  Do not wear any adhesive if you wear dentures.  Do not wear contacts; bring in glasses if needed.  Only take medications on the morning of procedure as instructed by PAT nurse per anesthesia guidelines or as instructed by physician's office.  If you are on any blood thinners reach out to the physician/surgeon's office for instructions on when/if they will need to be stopped prior to procedure.  Bring in picture ID and insurance card, advanced directive copies if applicable, CPAP/BIPAP/Inhalers if indicated morning of procedure, leave any other valuables at home.  Ensure you have arranged for someone to drive you home the day of your procedure and someone to care for you at home afterwards. It is recommended that you do not drive, drink alcohol, or make any major legal decisions for at least 24 hours after your procedure is complete.  ERAS instructions given unless otherwise instructed per surgeon's orders.    Instructions given on hospital entrance and registration location.

## 2025-02-25 ENCOUNTER — HOSPITAL ENCOUNTER (OUTPATIENT)
Facility: HOSPITAL | Age: 60
Setting detail: HOSPITAL OUTPATIENT SURGERY
Discharge: HOME | End: 2025-02-25
Attending: SURGERY | Admitting: SURGERY
Payer: OTHER GOVERNMENT

## 2025-02-25 ENCOUNTER — ANESTHESIA (OUTPATIENT)
Dept: GASTROENTEROLOGY | Facility: HOSPITAL | Age: 60
End: 2025-02-25
Payer: OTHER GOVERNMENT

## 2025-02-25 ENCOUNTER — ANESTHESIA EVENT (OUTPATIENT)
Dept: GASTROENTEROLOGY | Facility: HOSPITAL | Age: 60
End: 2025-02-25
Payer: OTHER GOVERNMENT

## 2025-02-25 VITALS
HEART RATE: 62 BPM | TEMPERATURE: 97.4 F | RESPIRATION RATE: 20 BRPM | DIASTOLIC BLOOD PRESSURE: 85 MMHG | SYSTOLIC BLOOD PRESSURE: 116 MMHG | WEIGHT: 225 LBS | BODY MASS INDEX: 33.33 KG/M2 | HEIGHT: 69 IN | OXYGEN SATURATION: 98 %

## 2025-02-25 DIAGNOSIS — Z12.11 SCREENING FOR COLON CANCER: ICD-10-CM

## 2025-02-25 LAB — GLUCOSE BLDC GLUCOMTR-MCNC: 106 MG/DL (ref 70–130)

## 2025-02-25 PROCEDURE — 82948 REAGENT STRIP/BLOOD GLUCOSE: CPT

## 2025-02-25 PROCEDURE — 88305 TISSUE EXAM BY PATHOLOGIST: CPT

## 2025-02-25 PROCEDURE — 25010000002 ONDANSETRON PER 1 MG: Performed by: NURSE ANESTHETIST, CERTIFIED REGISTERED

## 2025-02-25 PROCEDURE — S0260 H&P FOR SURGERY: HCPCS | Performed by: SURGERY

## 2025-02-25 PROCEDURE — 25010000002 PROPOFOL 10 MG/ML EMULSION: Performed by: NURSE ANESTHETIST, CERTIFIED REGISTERED

## 2025-02-25 PROCEDURE — 25010000002 LIDOCAINE (CARDIAC): Performed by: NURSE ANESTHETIST, CERTIFIED REGISTERED

## 2025-02-25 PROCEDURE — 25810000003 LACTATED RINGERS PER 1000 ML: Performed by: SURGERY

## 2025-02-25 RX ORDER — ONDANSETRON 2 MG/ML
INJECTION INTRAMUSCULAR; INTRAVENOUS AS NEEDED
Status: DISCONTINUED | OUTPATIENT
Start: 2025-02-25 | End: 2025-02-25 | Stop reason: SURG

## 2025-02-25 RX ORDER — SODIUM CHLORIDE, SODIUM LACTATE, POTASSIUM CHLORIDE, CALCIUM CHLORIDE 600; 310; 30; 20 MG/100ML; MG/100ML; MG/100ML; MG/100ML
50 INJECTION, SOLUTION INTRAVENOUS CONTINUOUS
Status: DISCONTINUED | OUTPATIENT
Start: 2025-02-25 | End: 2025-02-25 | Stop reason: HOSPADM

## 2025-02-25 RX ORDER — SODIUM CHLORIDE 0.9 % (FLUSH) 0.9 %
10 SYRINGE (ML) INJECTION AS NEEDED
Status: DISCONTINUED | OUTPATIENT
Start: 2025-02-25 | End: 2025-02-25 | Stop reason: HOSPADM

## 2025-02-25 RX ORDER — PROPOFOL 10 MG/ML
VIAL (ML) INTRAVENOUS AS NEEDED
Status: DISCONTINUED | OUTPATIENT
Start: 2025-02-25 | End: 2025-02-25 | Stop reason: SURG

## 2025-02-25 RX ORDER — ONDANSETRON 2 MG/ML
4 INJECTION INTRAMUSCULAR; INTRAVENOUS ONCE AS NEEDED
Status: DISCONTINUED | OUTPATIENT
Start: 2025-02-25 | End: 2025-02-25 | Stop reason: HOSPADM

## 2025-02-25 RX ADMIN — LIDOCAINE HYDROCHLORIDE 100 MG: 20 INJECTION, SOLUTION INTRAVENOUS at 08:12

## 2025-02-25 RX ADMIN — SODIUM CHLORIDE, SODIUM LACTATE, POTASSIUM CHLORIDE, CALCIUM CHLORIDE 50 ML/HR: 20; 30; 600; 310 INJECTION, SOLUTION INTRAVENOUS at 07:16

## 2025-02-25 RX ADMIN — PROPOFOL 100 MG: 10 INJECTION, EMULSION INTRAVENOUS at 08:12

## 2025-02-25 RX ADMIN — ONDANSETRON 4 MG: 2 INJECTION INTRAMUSCULAR; INTRAVENOUS at 08:39

## 2025-02-25 RX ADMIN — PROPOFOL 100 MCG/KG/MIN: 10 INJECTION, EMULSION INTRAVENOUS at 08:13

## 2025-02-25 NOTE — ANESTHESIA PREPROCEDURE EVALUATION
Anesthesia Evaluation     no history of anesthetic complications:   NPO Solid Status: > 8 hours  NPO Liquid Status: > 8 hours           Airway   Mallampati: II  TM distance: >3 FB  Neck ROM: full  Possible difficult intubation  Dental - normal exam     Pulmonary - normal exam   (+) a smoker Former, Abstained day of surgery, cigarettes,sleep apnea on CPAP  Cardiovascular - normal exam  Exercise tolerance: good (4-7 METS)    (+) hypertension well controlled less than 2 medications, hyperlipidemia      Neuro/Psych  GI/Hepatic/Renal/Endo    (+) obesity, diabetes mellitus type 2, thyroid problem hypothyroidism    Musculoskeletal     Abdominal  - normal exam   Substance History   (+) alcohol use     OB/GYN          Other   arthritis,                 Anesthesia Plan    ASA 3     MAC     (Risks and benefits discussed including risk of aspiration, recall and dental damage. All patient questions answered. Will continue with POC.)  intravenous induction     Anesthetic plan, risks, benefits, and alternatives have been provided, discussed and informed consent has been obtained with: patient.  Pre-procedure education provided  Plan discussed with CRNA.    CODE STATUS:

## 2025-02-25 NOTE — H&P
General Surgery     Name:Dom Marin  Age: 59 y.o.  Gender: male  : 1965  MRN: [id number]  Visit Number: [id number]  Admit Date: 2025  Date of Service: 25    Patient Care Team:  Suma Zurita MD as PCP - General (Family Medicine)  Fatoumata Tracy APRN as Nurse Practitioner (Nurse Practitioner)  Yoni Medina MD as Surgeon (Orthopedic Surgery)  Alban Avalos PA-C as Physician Assistant (Orthopedic Surgery)      Chief complaint : colon cancer screening      History of Present Illness:     Dom Marin is a 59 y.o. male patient who presents for screening colonoscopy.  His last exam was in .  He denies lower GI complaints.      Patient Active Problem List   Diagnosis    Hyperlipidemia LDL goal <130    MILDRED (obstructive sleep apnea)    Type 2 diabetes mellitus with circulatory disorder, without long-term current use of insulin    Essential hypertension    History of shingles    Acquired hypothyroidism    Arthritis of left hip    Status post total hip replacement, left    Screening for colon cancer         Past Medical History:   Diagnosis Date    Arthritis May, 2022    Left hip    Colon polyp     Diabetes mellitus     Fracture     Hyperlipidemia     Hypertension     Hypothyroidism     L-thyroxine    Impaired mobility     Obesity     Sleep apnea     no CPAP       Past Surgical History:   Procedure Laterality Date    COLONOSCOPY      TONSILLECTOMY      TOTAL HIP ARTHROPLASTY Left 10/29/2024    Procedure: Elective left total hip arthroplasty;  Surgeon: Yoni Medina MD;  Location: [facility];  Service: Orthopedics;  Laterality: Left;       Family History   Problem Relation Age of Onset    Diabetes Mother     Hyperlipidemia Mother     Cancer Father     Hypertension Father        Social History     Socioeconomic History    Marital status:    Tobacco Use    Smoking status: Former     Current packs/day: 0.00     Average packs/day: 1.1 packs/day for 31.2  years (35.1 ttl pk-yrs)     Types: Cigarettes     Start date: 10/15/1984     Quit date: 2008     Years since quittin.0    Smokeless tobacco: Former     Types: Snuff     Quit date: 2008   Vaping Use    Vaping status: Never Used   Substance and Sexual Activity    Alcohol use: Yes     Alcohol/week: 17.0 standard drinks of alcohol     Types: 12 Cans of beer, 5 Drinks containing 0.5 oz of alcohol per week    Drug use: No    Sexual activity: Not Currently     Partners: Female     Birth control/protection: Post-menopausal         Current Facility-Administered Medications:     lactated ringers infusion, 50 mL/hr, Intravenous, Continuous, Aissatou Edge MD, Last Rate: 50 mL/hr at 2516, 50 mL/hr at 25 07    Medications Prior to Admission   Medication Sig Dispense Refill Last Dose/Taking    Ascorbic Acid (VITAMIN C) 500 MG capsule Take  by mouth.   2025    bisacodyl 5 MG EC tablet Take 4 tablets at 1:00pm with 8oz of clear liquids the day before your colonoscopy. 4 tablet 0 2025    levothyroxine (Synthroid) 75 MCG tablet Take 1 tablet by mouth Every Morning. 90 tablet 1 2025    Misc Natural Products (JOINT HEALTH PO) Take  by mouth.   2025    Omega-3 Fatty Acids (Fish Oil) 1000 MG capsule delayed-release 2000 mg DAILY (route: oral)   Past Week    polyethylene glycol (MIRALAX) 17 GM/SCOOP powder Mix 238g powder with 64 oz of clear liquid at 4:00pm. Drink 8 oz every 10-15 minutes until consumed. 238 g 0 2025    rosuvastatin (CRESTOR) 20 MG tablet Take 1 tablet by mouth Every Night. To lower cholesterol and risk of stroke. 90 tablet 3 2025    Semaglutide (Rybelsus) 14 MG tablet Take 1 tablet by mouth Every Morning. 90 tablet 3 2025    TURMERIC  mg DAILY (route: oral)   2025       No Known Allergies    Review of Systems   Constitutional:  Negative for chills, fever and unexpected weight change.   HENT:  Negative for trouble swallowing and voice  change.    Eyes:  Negative for visual disturbance.   Respiratory:  Negative for apnea, cough, chest tightness, shortness of breath and wheezing.    Cardiovascular:  Negative for chest pain, palpitations and leg swelling.   Gastrointestinal:  Negative for abdominal distention, abdominal pain, anal bleeding, blood in stool, constipation, diarrhea, nausea, rectal pain and vomiting.   Endocrine: Negative for cold intolerance and heat intolerance.   Genitourinary:  Negative for difficulty urinating, dysuria, flank pain, scrotal swelling and testicular pain.   Musculoskeletal:  Negative for back pain, gait problem and joint swelling.   Skin:  Negative for color change, rash and wound.   Neurological:  Negative for dizziness, syncope, speech difficulty, weakness, numbness and headaches.   Hematological:  Negative for adenopathy. Does not bruise/bleed easily.   Psychiatric/Behavioral:  Negative for confusion. The patient is not nervous/anxious.        OBJECTIVE:     Vital Signs  Temp:  [97.6 °F (36.4 °C)] 97.6 °F (36.4 °C)  Heart Rate:  [76] 76  Resp:  [14] 14  BP: (140)/(97) 140/97    No intake/output data recorded.  No intake/output data recorded.      Physical Exam:      General Appearance:    Alert, cooperative, in no acute distress   Head:    Normocephalic, without obvious abnormality, atraumatic   Eyes:            Lids and lashes normal, conjunctivae and sclerae normal, no icterus   Ears:    Ears appear intact with no abnormalities noted   Lungs:     Respirations regular, even and unlabored    Heart:    Regular rhythm and normal rate   Abdomen:     Soft, non-tender, non-distended, no guarding, no rebound   tenderness   Extremities:   Moves all extremities well, no edema, no cyanosis, no  redness   Skin:   No bleeding, bruising or rash   Neurologic:   AAOx3, no gross deficits         Results Review:   I have reviewed the entirety of the patient's clinical lab results.  I have also personally reviewed the patient's  imaging      Lab Results (last 72 hours)       Procedure Component Value Units Date/Time    POC Glucose Once [[id number]]  (Normal) Collected: 02/25/25 0701    Specimen: Blood Updated: 02/25/25 0705     Glucose 106 mg/dL      Comment: Serial Number: [id number]:  [id number]                               ASSESSMENT/PLAN:      Screening for colon cancer    We discussed colonoscopy for colon cancer screening purposes.  We discussed the indications for screening colonoscopy as well as the risks, benefits and alternatives to this procedure. Risks including but not limited to perforation, bleeding,need for blood transfusion or emergent surgery ,and missed neoplasm were reviewed in detail with the patient.  The patient was given an opportunity to ask questions.  The patient verbalized understanding of these recommendations and the plan of care. The patient is willing to proceed with colonoscopy and has signed informed consent .    Aissatou Edge MD  02/25/25  08:09 EST

## 2025-02-25 NOTE — ANESTHESIA POSTPROCEDURE EVALUATION
Patient: Dom Marin    Procedure Summary       Date: 02/25/25 Room / Location: [facility] ENDOSCOPY 3 / [facility] ENDOSCOPY    Anesthesia Start: 0800 Anesthesia Stop: 0843    Procedure: COLONOSCOPY with polypectomy Diagnosis:       Screening for colon cancer      (Screening for colon cancer [Z12.11])    Surgeons: Aissatou Edge MD Provider: Fátima Antony CRNA    Anesthesia Type: MAC ASA Status: 3            Anesthesia Type: MAC    Vitals  Vitals Value Taken Time   /85 02/25/25 0915   Temp 97.4 °F (36.3 °C) 02/25/25 0845   Pulse 62 02/25/25 0915   Resp 20 02/25/25 0915   SpO2 98 % 02/25/25 0915           Post Anesthesia Care and Evaluation    Patient location during evaluation: PHASE II  Patient participation: complete - patient participated  Level of consciousness: awake and alert  Pain score: 0  Pain management: satisfactory to patient    Airway patency: patent  Anesthetic complications: No anesthetic complications  PONV Status: none  Cardiovascular status: acceptable and stable  Respiratory status: acceptable  Hydration status: acceptable    Comments: Vitals signs as noted in nursing documentation as per protocol.

## 2025-02-26 LAB — REF LAB TEST METHOD: NORMAL

## 2025-03-04 NOTE — PROGRESS NOTES
Sessile serrated adenoma x 3, tubular adenoma x 1, hyperplastic polyp x 1.  Repeat colonoscopy in 3 years

## 2025-04-24 ENCOUNTER — PATIENT MESSAGE (OUTPATIENT)
Dept: INTERNAL MEDICINE | Facility: CLINIC | Age: 60
End: 2025-04-24
Payer: OTHER GOVERNMENT

## 2025-04-24 DIAGNOSIS — M25.559 HIP PAIN, UNSPECIFIED LATERALITY: Primary | ICD-10-CM

## 2025-05-02 DIAGNOSIS — M25.551 RIGHT HIP PAIN: ICD-10-CM

## 2025-05-02 DIAGNOSIS — M16.11 PRIMARY OSTEOARTHRITIS OF RIGHT HIP: Primary | ICD-10-CM

## 2025-05-08 ENCOUNTER — HOSPITAL ENCOUNTER (OUTPATIENT)
Dept: GENERAL RADIOLOGY | Facility: HOSPITAL | Age: 60
Discharge: HOME | End: 2025-05-08
Payer: OTHER GOVERNMENT

## 2025-05-08 PROCEDURE — 25010000002 METHYLPREDNISOLONE PER 80 MG: Performed by: STUDENT IN AN ORGANIZED HEALTH CARE EDUCATION/TRAINING PROGRAM

## 2025-05-08 PROCEDURE — 25010000002 LIDOCAINE 1 % SOLUTION: Performed by: STUDENT IN AN ORGANIZED HEALTH CARE EDUCATION/TRAINING PROGRAM

## 2025-05-08 PROCEDURE — 77002 NEEDLE LOCALIZATION BY XRAY: CPT

## 2025-05-08 RX ORDER — METHYLPREDNISOLONE ACETATE 80 MG/ML
80 INJECTION, SUSPENSION INTRA-ARTICULAR; INTRALESIONAL; INTRAMUSCULAR; SOFT TISSUE ONCE
Status: COMPLETED | OUTPATIENT
Start: 2025-05-08 | End: 2025-05-08

## 2025-05-08 RX ORDER — LIDOCAINE HYDROCHLORIDE 10 MG/ML
10 INJECTION, SOLUTION INFILTRATION; PERINEURAL ONCE
Status: COMPLETED | OUTPATIENT
Start: 2025-05-08 | End: 2025-05-08

## 2025-05-08 RX ADMIN — METHYLPREDNISOLONE ACETATE 80 MG: 80 INJECTION, SUSPENSION INTRA-ARTICULAR; INTRALESIONAL; INTRAMUSCULAR; SOFT TISSUE at 13:38

## 2025-05-08 RX ADMIN — LIDOCAINE HYDROCHLORIDE 10 ML: 10 INJECTION, SOLUTION INFILTRATION; PERINEURAL at 13:37

## 2025-05-08 NOTE — POST-PROCEDURE NOTE
[facility]  [address], [address]  [place] [address]  [phone]        PROCEDURE REPORT        DIAGNOSIS:  Right hip osteoarthritis, symptomatic    PROCEDURE: Right  hip injection under flouroscopy      Dom Marin with date of birth 1965 presents to [facility] Radiology Department today for injection therapy.        Patient presents to [facility] Radiology Department Flouroscopy Suite on 5/8/2025 for planned elective right hip injection under flouroscopy for symptomatic osteoarthritis.    Procedure:     After consent was obtained, and using ethyl chloride topical local anesthetic, the right hip was then prepped and draped with sterile technique. With an anterior hip approach, flouroscopy guidance, and care to stay lateral of the femoral artery, the hip joint was entered via a 20 gauge spinal needle.  An image was saved of the needle within the hip joint space.  A mixture of 80 mg methylprednisolone in one ml plus 5 ml of 1% plain Lidocaine was injected and the needle withdrawn and a band aid applied. The procedure was well tolerated and without complication.  The patient did remain stable and with baseline ambulation. The patient is asked to avoid stressful physical activity for a day or two before resuming full regular activities.  There might be some soreness initially and patient to call for any adverse effect of the injection treatment.  Call or return to clinic if any fever, swelling, persistent pain, lack of anticipated improvement or other symptoms or concerns.    Impression: Symptomatic right hip osteoarthritis.      Recommendations/Plan:      Treatment and patient advice as noted here and in office visit report.  Orthopedic activities and goals reviewed and patient expressed appreciation.  Call or notify for any adverse effect from injection therapy.    Exercise: As tolerated.  No strenuous activity for a few days as appropriate.  Activity:  May  perform usual activities as tolerated.      Patient will return to our clinic at scheduled appointment.  Patient agreeable to call or return sooner for any concerns.

## 2025-07-08 ENCOUNTER — OFFICE VISIT (OUTPATIENT)
Dept: INTERNAL MEDICINE | Facility: CLINIC | Age: 60
End: 2025-07-08
Payer: OTHER GOVERNMENT

## 2025-07-08 VITALS
HEART RATE: 72 BPM | WEIGHT: 231.4 LBS | BODY MASS INDEX: 34.27 KG/M2 | DIASTOLIC BLOOD PRESSURE: 74 MMHG | SYSTOLIC BLOOD PRESSURE: 124 MMHG | OXYGEN SATURATION: 95 % | TEMPERATURE: 97.1 F | HEIGHT: 69 IN

## 2025-07-08 DIAGNOSIS — E78.5 HYPERLIPIDEMIA LDL GOAL <100: ICD-10-CM

## 2025-07-08 DIAGNOSIS — E03.9 ACQUIRED HYPOTHYROIDISM: ICD-10-CM

## 2025-07-08 DIAGNOSIS — E11.59 TYPE 2 DIABETES MELLITUS WITH OTHER CIRCULATORY COMPLICATION, WITHOUT LONG-TERM CURRENT USE OF INSULIN: Primary | ICD-10-CM

## 2025-07-08 LAB
EXPIRATION DATE: ABNORMAL
EXPIRATION DATE: NORMAL
HBA1C MFR BLD: 6 % (ref 4.5–5.7)
Lab: ABNORMAL
Lab: NORMAL
POC ALBUMIN, URINE: 30 MG/L
POC CREATININE, URINE: 300 MG/DL
POC URINE ALB/CREA RATIO: <30

## 2025-07-08 RX ORDER — ORAL SEMAGLUTIDE 14 MG/1
14 TABLET ORAL EVERY MORNING
Qty: 90 TABLET | Refills: 1 | Status: SHIPPED | OUTPATIENT
Start: 2025-07-08

## 2025-07-08 RX ORDER — ROSUVASTATIN CALCIUM 20 MG/1
20 TABLET, COATED ORAL NIGHTLY
Qty: 90 TABLET | Refills: 1 | Status: SHIPPED | OUTPATIENT
Start: 2025-07-08

## 2025-07-08 RX ORDER — LEVOTHYROXINE SODIUM 75 UG/1
75 TABLET ORAL
Qty: 90 TABLET | Refills: 1 | Status: SHIPPED | OUTPATIENT
Start: 2025-07-08

## 2025-07-08 NOTE — ASSESSMENT & PLAN NOTE
Diabetes is improving with treatment.   Continue current treatment regimen.  Diabetes will be reassessed in 6 months with labs to do prior to appointment.

## 2025-07-08 NOTE — PROGRESS NOTES
"Chief Complaint  Diabetes    Subjective        Dom Marin presents to [facility] PRIMARY CARE  Primary Care Follow-Up  Conditions present: diabetes and thyroid disorder    Current symptoms: fatigue, weight gain and constipation      Current symptoms: no chest pain, no confusion, no dizziness, no dry mouth, no nausea, no palpitations, no shortness of breath, no weight loss, no blurred vision, no foot ulcerations, no headaches, no hoarse voice, no cold intolerance, no hair loss, no heat intolerance, no globus sensation and no trouble swallowing    Treatment compliance:  All of the time  Treatment barriers:  No complaince problems and lack of exercise  Exercise:  Intermittently  Diabetes:     Current treatments:  Oral medications    Home blood tests:  Not monitored    Meal planning:  Calorie counting    Eye exam current: no      Sees podiatrist: no        Objective   Vital Signs:  /74   Pulse 72   Temp 97.1 °F (36.2 °C)   Ht 175.3 cm (69\")   Wt 105 kg (231 lb 6.4 oz)   SpO2 95%   BMI 34.17 kg/m²   Estimated body mass index is 34.17 kg/m² as calculated from the following:    Height as of this encounter: 175.3 cm (69\").    Weight as of this encounter: 105 kg (231 lb 6.4 oz).          Physical Exam  Vitals and nursing note reviewed.   Constitutional:       General: He is not in acute distress.     Appearance: Normal appearance. He is well-developed and well-groomed. He is obese. He is not ill-appearing, toxic-appearing or diaphoretic.   HENT:      Head: Normocephalic and atraumatic.      Right Ear: Hearing normal.      Left Ear: Hearing normal.   Eyes:      General: Lids are normal. No scleral icterus.        Right eye: No discharge.         Left eye: No discharge.      Extraocular Movements: Extraocular movements intact.   Cardiovascular:      Rate and Rhythm: Normal rate and regular rhythm.   Pulmonary:      Effort: Pulmonary effort is normal.   Musculoskeletal:      Cervical back: " Neck supple.   Skin:     Coloration: Skin is not jaundiced or pale.   Neurological:      General: No focal deficit present.      Mental Status: He is alert and oriented to person, place, and time.   Psychiatric:         Attention and Perception: Attention and perception normal.         Mood and Affect: Mood and affect normal.         Speech: Speech normal.         Behavior: Behavior normal. Behavior is cooperative.         Thought Content: Thought content normal.         Cognition and Memory: Cognition and memory normal.         Judgment: Judgment normal.          Result Review :  The following data was reviewed by: Suma Zurita MD on 04/16/2025:  Lab Results   Component Value Date    HGBA1C 6.0 (A) 07/08/2025    HGBA1C 5.70 (H) 12/30/2024    HGBA1C 5.90 (H) 10/14/2024      Office Visit on 07/08/2025   Component Date Value Ref Range Status    POC ALBUMIN, URINE 07/08/2025 30  mg/L Final    POC CREATININE, URINE 07/08/2025 300  mg/dL Final    POC Urine Albumin Creatinine Ratio 07/08/2025 <30  <30 Final    Lot Number 07/08/2025 [id number]   Final    Expiration Date 07/08/2025 04/30/2026   Final    Hemoglobin A1C 07/08/2025 6.0 (A)  4.5 - 5.7 % Final    Lot Number 07/08/2025 [id number]   Final    Expiration Date 07/08/2025 03/31/2027   Final        Lab Results   Component Value Date    TSH 0.916 12/30/2024     Lab Results   Component Value Date    PSA 0.194 12/30/2024    PSA 0.191 11/21/2023    PSA 0.262 11/16/2022                Assessment and Plan   Diagnoses and all orders for this visit:    1. Type 2 diabetes mellitus with other circulatory complication, without long-term current use of insulin (Primary)  Overview:  Associated with hyperlipidemia  GI side effects from metformin    Assessment & Plan:  Diabetes is improving with treatment.   Continue current treatment regimen.  Diabetes will be reassessed in 6 months with labs to do prior to appointment.    Orders:  -     POC Albumin/Creatinine Ratio Urine  -      POC Glycosylated Hemoglobin (Hb A1C)  -     rosuvastatin (CRESTOR) 20 MG tablet; Take 1 tablet by mouth Every Night. To lower cholesterol and risk of stroke.  Dispense: 90 tablet; Refill: 1  -     Semaglutide (Rybelsus) 14 MG tablet; Take 1 tablet by mouth Every Morning.  Dispense: 90 tablet; Refill: 1    2. Acquired hypothyroidism  -     levothyroxine (Synthroid) 75 MCG tablet; Take 1 tablet by mouth Every Morning.  Dispense: 90 tablet; Refill: 1    3. Hyperlipidemia LDL goal <100  -     rosuvastatin (CRESTOR) 20 MG tablet; Take 1 tablet by mouth Every Night. To lower cholesterol and risk of stroke.  Dispense: 90 tablet; Refill: 1                  Follow Up   Return in about 6 months (around 1/12/2026) for Annual physical, fasting labs 2-7 days prior to visit.  Patient was given instructions and counseling regarding his condition or for health maintenance advice. Please see specific information pulled into the AVS if appropriate.

## 2025-07-15 ENCOUNTER — TELEPHONE (OUTPATIENT)
Dept: ORTHOPEDIC SURGERY | Facility: CLINIC | Age: 60
End: 2025-07-15
Payer: OTHER GOVERNMENT

## 2025-07-15 NOTE — TELEPHONE ENCOUNTER
"    Caller: Dom Marin \"Kirt\"    Relationship to patient: Self    Best call back number: [phone]    Chief complaint: RIGHT HIP    Type of visit: INJECTION    Additional notes:SPOKE TO PT- HE STATES THAT HE WOULD LIKE TO SCHEDULE AN APPOINTMENT FOR [place] [facility] Large Joint  IN RIGHT HIP    LAST INJECTION WAS 5.2.25    PLEASE CONTACT PT AND ADVISE        " 0

## 2025-07-18 DIAGNOSIS — M16.11 PRIMARY OSTEOARTHRITIS OF RIGHT HIP: Primary | ICD-10-CM

## 2025-07-18 NOTE — TELEPHONE ENCOUNTER
Spoke with patient, hip fluoro injection scheduled for 8/14/25. Appt reminder card and letter mailed to patient.

## 2025-08-14 ENCOUNTER — HOSPITAL ENCOUNTER (OUTPATIENT)
Dept: GENERAL RADIOLOGY | Facility: HOSPITAL | Age: 60
Discharge: HOME | End: 2025-08-14
Payer: OTHER GOVERNMENT

## 2025-08-14 PROCEDURE — 77002 NEEDLE LOCALIZATION BY XRAY: CPT

## 2025-08-14 PROCEDURE — 25010000002 LIDOCAINE 1 % SOLUTION: Performed by: STUDENT IN AN ORGANIZED HEALTH CARE EDUCATION/TRAINING PROGRAM

## 2025-08-14 PROCEDURE — 25010000002 METHYLPREDNISOLONE PER 80 MG: Performed by: STUDENT IN AN ORGANIZED HEALTH CARE EDUCATION/TRAINING PROGRAM

## 2025-08-14 RX ORDER — LIDOCAINE HYDROCHLORIDE 10 MG/ML
5 INJECTION, SOLUTION INFILTRATION; PERINEURAL ONCE
Status: COMPLETED | OUTPATIENT
Start: 2025-08-14 | End: 2025-08-14

## 2025-08-14 RX ORDER — METHYLPREDNISOLONE ACETATE 80 MG/ML
80 INJECTION, SUSPENSION INTRA-ARTICULAR; INTRALESIONAL; INTRAMUSCULAR; SOFT TISSUE ONCE
Status: COMPLETED | OUTPATIENT
Start: 2025-08-14 | End: 2025-08-14

## 2025-08-14 RX ADMIN — LIDOCAINE HYDROCHLORIDE 5 ML: 10 INJECTION, SOLUTION INFILTRATION; PERINEURAL at 15:30

## 2025-08-14 RX ADMIN — METHYLPREDNISOLONE ACETATE 80 MG: 80 INJECTION, SUSPENSION INTRA-ARTICULAR; INTRALESIONAL; INTRAMUSCULAR; SOFT TISSUE at 15:32

## (undated) DEVICE — VLV SXN AIR/H2O ORCAPOD3 1P/U STRL

## (undated) DEVICE — SLV SCD CALF HEMOFORCE DVT THERP REPROC MD

## (undated) DEVICE — BIT DRL RNGLC QC 3.2X40MM

## (undated) DEVICE — PK HIP GEN 20

## (undated) DEVICE — SUT VIC 2/0 CT1 27IN J259H

## (undated) DEVICE — DRSNG SURG AQUACEL AG 9X25CM

## (undated) DEVICE — Device

## (undated) DEVICE — DRP SURG U/DRP INVISISHIELD PA 48X52IN

## (undated) DEVICE — 2108 SERIES SAGITTAL BLADE, NO OFFSET  (24.8 X 1.24 X 80.1MM)

## (undated) DEVICE — VIOLET BRAIDED (POLYGLACTIN 910), SYNTHETIC ABSORBABLE SUTURE: Brand: COATED VICRYL

## (undated) DEVICE — DRSNG SURG AQUACEL AG/ADVNTGE 9X25CM 3.5X10IN

## (undated) DEVICE — TUBING, SUCTION, 1/4" X 12', STRAIGHT: Brand: MEDLINE

## (undated) DEVICE — HANDPIECE SET WITH HIGH FLOW TIP AND SUCTION TUBE: Brand: INTERPULSE

## (undated) DEVICE — LUBE JELLY PK/2.75GM STRL BX/144

## (undated) DEVICE — 1000 S-DRAPE TOWEL DRAPE 10/BX: Brand: STERI-DRAPE™

## (undated) DEVICE — GLV SURG SENSICARE PI ORTHO SZ8 LF STRL

## (undated) DEVICE — PATIENT RETURN ELECTRODE, SINGLE-USE, CONTACT QUALITY MONITORING, ADULT, WITH 9FT CORD, FOR PATIENTS WEIGING OVER 33LBS. (15KG): Brand: MEGADYNE

## (undated) DEVICE — SHEET,DRAPE,70X100,STERILE: Brand: MEDLINE

## (undated) DEVICE — MEDI-VAC NON-CONDUCTIVE SUCTION TUBING: Brand: CARDINAL HEALTH

## (undated) DEVICE — GLOVE,SURG,SENSICARE,ALOE,LF,PF,6: Brand: MEDLINE

## (undated) DEVICE — GLOVE,SURG,SENSICARE SLT,LF,PF,8: Brand: MEDLINE

## (undated) DEVICE — FRCP BX RADJAW4 NDL 2.8 240 STD OG

## (undated) DEVICE — SUT ETHIB 5 V37 30IN MB66G

## (undated) DEVICE — PILLW ABD SM